# Patient Record
Sex: MALE | Race: WHITE | NOT HISPANIC OR LATINO | Employment: OTHER | URBAN - METROPOLITAN AREA
[De-identification: names, ages, dates, MRNs, and addresses within clinical notes are randomized per-mention and may not be internally consistent; named-entity substitution may affect disease eponyms.]

---

## 2017-08-25 ENCOUNTER — OFFICE VISIT (OUTPATIENT)
Dept: URGENT CARE | Facility: URGENT CARE | Age: 82
End: 2017-08-25
Payer: COMMERCIAL

## 2017-08-25 VITALS
HEIGHT: 69 IN | TEMPERATURE: 98.4 F | SYSTOLIC BLOOD PRESSURE: 102 MMHG | HEART RATE: 76 BPM | OXYGEN SATURATION: 96 % | WEIGHT: 154 LBS | DIASTOLIC BLOOD PRESSURE: 74 MMHG | BODY MASS INDEX: 22.81 KG/M2

## 2017-08-25 DIAGNOSIS — T63.441A BEE STING REACTION, ACCIDENTAL OR UNINTENTIONAL, INITIAL ENCOUNTER: Primary | ICD-10-CM

## 2017-08-25 PROCEDURE — 99213 OFFICE O/P EST LOW 20 MIN: CPT | Performed by: FAMILY MEDICINE

## 2017-08-25 NOTE — MR AVS SNAPSHOT
"              After Visit Summary   8/25/2017    Matthew Branch    MRN: [id number]           Patient Information     Date Of Birth          2/25/1933        Visit Information        Provider Department      8/25/2017 7:55 PM Guerline Vaughn MD [facility]        Care Instructions      Insect, Spider, and Scorpion Bites and Stings  Most insect bites are harmless and cause only minor swelling or itching. But if you re allergic to insects such as wasps or bees, a sting can cause a life-threatening allergic reaction. Some ticks can carry and transmit serious diseases. The venom (poison) from scorpions and certain spiders can also be deadly, although this is rare. Knowing when to seek emergency care could save your life.     The black  (top) and brown recluse (bottom) are two poisonous spiders found in the [place].   When to go to the emergency room (ER)    Scorpion sting    Bite from a black, red, or brown  spider or brown recluse spider    Severe pain or swelling at the site of bite    A tick that is embedded in your skin and can not be easily removed at home    Signs of an allergic reaction such as:    Hives    Swelling of your eyes, lips, or the inside of your throat    Trouble breathing    Dizziness or confusion  What to expect in the ER    If you re having trouble breathing, you ll be given oxygen through a mask. In case of severe breathing difficulty, you may have a tube inserted in your throat and be placed on a ventilator (breathing machine).    If you are having a severe allergic reaction from a sting (called anaphylaxis), you may be given a shot of epinephrine. If it is known that you are allergic to bee or wasp stings, your doctor may give you a prescription for an \"epi-pen\" that you can keep with you at all times in case of a sting.    You may receive antivenin (a substance that reverses the effects of poison) for some spider bites and scorpion stings. Because " antivenin can sometimes cause other problems, your doctor will weigh the risks and benefits of this treatment.    Steroids such as prednisone are often used to treat allergic reactions. In many cases, your doctor will also prescribe an antihistamine to help relieve itching.  Easing symptoms of an insect bite or sting    Try to remove a stinger you can see. Use your fingernail, a knife edge, or credit card to scrape against the skin. Do not squeeze or pull.    Apply ice or a cold compress to reduce pain and swelling (keep a thin cloth between the cold source and the skin).   Date Last Reviewed: 12/1/2016 2000-2017 [employer]. [address] [place] [address], [place] [address]. All rights reserved. This information is not intended as a substitute for professional medical care. Always follow your healthcare professional's instructions.        Insect Sting: Local Reaction   You have been stung or bitten by an insect. The insect s venom or body fluid is causing your skin to react in the area where you were stung or bitten. This often causes redness, itching and swelling. This reaction will fade over a few hours, but it can last a few days. An insect bite or sting can become infected 1 to 3 days later, so watch for the signs below. Sometimes it is hard to tell the difference between a local reaction to the insect bite or sting and an early infection, so you may be given antibiotics.  Common insect stings causing problems are from wasps, bees, yellow jackets, and hornets. Common bites are from spiders, mosquitoes, fleas, or ticks. Other types of insects may be more common in different parts of the country or world.  Most people think of allergic reactions when someone has a rash or itchy skin. Symptoms can include:    Rash, hives, redness, welts, or blisters    Itching, burning, stinging, or pain    Swelling around the sting area.  Sometimes swelling spreads to other areas.  Home care  Medicines  The healthcare  provider may prescribe medicines to relieve swelling, itching, and pain. Follow the provider s instructions when taking these medicines.    If you had a severe reaction, the provider may prescribe an epinephrine kit. Epinephrine will stop an allergic reaction from getting worse. Before you leave the hospital, be sure that you understand when and how to use this medicine.    Diphenhydramine is an oral antihistamine available at drugstores and groceries. Unless a prescription antihistamine was given, you can use this medicine to reduce itching if large areas of the skin are involved. The medicine may make you sleepy, so be careful using it in the daytime or when going to school, working, or driving. Don t use diphenhydramine if you have glaucoma or if you are a man with trouble urinating because of an enlarged prostate. Other antihistamines cause less drowsiness and are good choices for daytime use. Ask your pharmacist for suggestions.    Don t use diphenhydramine cream on your skin. In some people it can cause additional reaction and make you allergic to this medicine.    Calamine lotion or oatmeal baths sometimes help with itching.    You may use acetaminophen or ibuprofen to control pain, unless another pain medicine was prescribed. Talk with your healthcare provider before using these medicines if you have chronic liver or kidney disease. Also talk with your provider if you ve had a stomach ulcer or GI bleeding.  General care      If itching is a problem, don t take hot showers or baths. Stay out of direct sunlight. These heat up your skin and will make the itching worse.    Use an ice pack to reduce local areas of redness and itching. You can make your own ice pack by putting ice cubes in a bag that seals and wrapping it in a thin towel. Don t put the ice directly on your skin, because it can damage the skin.    Try not to scratch any affected areas and damage the skin. This will help prevent an infection.    If  oral antibiotics were prescribed, be sure to take them until finished.  Preventing future reactions    Future reactions could be worse than this one, so try to stay away from places where you might be stung again.    Be aware that honeybees nest in trees. Wasps and yellow jackets nest in the ground, trees, or roof eaves.    If you are stung by a honeybee, a stinger will remain in your skin. Wasps, yellow jackets, and hornets don t leave a stinger behind. Move away from the nest area right away. The stinger of a honeybee releases a substance that will attract other bees to you. Once you are away from the nest, then remove the stinger as quickly as possible.    After any sting, you may apply ice and take diphenhydramine or another antihistamine. If you develop any of the warning signs below, seek help right away.    If you are at high risk for another sting, or if your reaction included dizziness, fainting, or trouble breathing or swallowing, ask your doctor for an insect allergy kit.    Remove any ticks on the skin with a set of fine tweezers.  the tick as close to the skin as possible. Pull back gently but firmly. Use an even, steady pressure. Don t jerk or twist. Don t squeeze, crush, or puncture the body of the tick. The bodily fluids may contain infection-causing germs. Don t use a smoldering match or cigarette, nail polish, petroleum jelly, liquid soap, or kerosene. These may irritate the tick. If any mouthparts of the tick remain in the skin, these should be left alone. They will fall off on their own. Trying to remove these parts may damage the skin unless they can be removed very easily. After the tick is removed, wash the bite area with rubbing alcohol, iodine, or soap and water.  Follow-up care  Follow up with your doctor in 2 days, or as advised, if your symptoms don t start to get better.  Call 911  Call 911 if any of these occur:    Trouble breathing or swallowing, or wheezing    New or worsening  swelling in the mouth, throat, or tongue    Hoarse voice or trouble speaking    Confused    Very drowsy or trouble awakening    Fainting or loss of consciousness    Rapid heart rate    Low blood pressure    Feeling of doom    Nausea, vomiting, abdominal pain, or diarrhea    Vomiting blood, or large amounts of blood in stool    Seizure  When to seek medical advice  Call your healthcare provider right away if any of these occur:    Spreading areas of itching, redness or swelling    New or worse swelling in the face, eyelids, or  lips    Dizziness or weakness  Also call your provider right away if you have signs of infection:    Spreading redness    Increased pain or swelling    Fever of 100.4 F (38 C) or higher, or as directed by your healthcare provider    Colored fluid draining from the sting area   Date Last Reviewed: 10/1/2016    7682-7146 [employer]. [address] [place] [address]. All rights reserved. This information is not intended as a substitute for professional medical care. Always follow your healthcare professional's instructions.                Follow-ups after your visit        Who to contact     If you have questions or need follow up information about today's clinic visit or your schedule please contact [facility] directly at [phone].  Normal or non-critical lab and imaging results will be communicated to you by MyChart, letter or phone within 4 business days after the clinic has received the results. If you do not hear from us within 7 days, please contact the clinic through [employer] or phone. If you have a critical or abnormal lab result, we will notify you by phone as soon as possible.  Submit refill requests through [employer]ix or call your pharmacy and they will forward the refill request to us. Please allow 3 business days for your refill to be completed.          Additional Information About Your Visit        MyChart Information     [employer]ix lets  "you send messages to your doctor, view your test results, renew your prescriptions, schedule appointments and more. To sign up, go to [email or web address] . Click on \"Log in\" on the left side of the screen, which will take you to the Welcome page. Then click on \"Sign up Now\" on the right side of the page.     You will be asked to enter the access code listed below, as well as some personal information. Please follow the directions to create your username and password.     Your access code is: [email or web address]  Expires: 2017  8:19 PM     Your access code will  in 90 days. If you need help or a new code, please call your [facility] clinic or [phone].        Care EveryWhere ID     This is your Care EveryWhere ID. This could be used by other organizations to access your [facility] medical records  [id number]        Your Vitals Were     Pulse Temperature Height Pulse Oximetry BMI (Body Mass Index)       76 98.4  F (36.9  C) (Oral) 5' 8.5\" (1.74 m) 96% 23.08 kg/m2        Blood Pressure from Last 3 Encounters:   17 102/74   04/14/15 134/72   12 173/90    Weight from Last 3 Encounters:   17 154 lb (69.9 kg)   04/14/15 171 lb (77.6 kg)   12/10/13 170 lb (77.1 kg)              Today, you had the following     No orders found for display       Primary Care Provider Office Phone # Fax #    Anatoly Aburto [phone] [address] [address]        Equal Access to Services     [facility] : Hadii kostas Parker, christine cazares, leni matt . So [facility] [phone].    ATENCIÓN: Si habla español, tiene a west disposición servicios gratuitos de asistencia lingüística. Llame al [phone].    We comply with applicable federal civil rights laws and [place] laws. We do not discriminate on the basis of race, color, national origin, age, disability sex, sexual orientation or gender " identity.            Thank you!     Thank you for choosing [facility]  for your care. Our goal is always to provide you with excellent care. Hearing back from our patients is one way we can continue to improve our services. Please take a few minutes to complete the written survey that you may receive in the mail after your visit with us. Thank you!             Your Updated Medication List - Protect others around you: Learn how to safely use, store and throw away your medicines at www.disposemymeds.org.          This list is accurate as of: 8/25/17  8:19 PM.  Always use your most recent med list.                   Brand Name Dispense Instructions for use Diagnosis    BENAZEPRIL HCL PO      Take 80 mg by mouth daily.        cholecalciferol 1000 UNIT tablet    vitamin D    100 tablet    Take 1 tablet by mouth daily.    Status post THR (total hip replacement)       * GABAPENTIN PO      Take 600 mg by mouth 3 times daily        * gabapentin 300 MG capsule    NEURONTIN    90 capsule    Take 1 capsule (300 mg) by mouth 3 times daily    Lumbar stenosis       hydrochlorothiazide 25 MG tablet    HYDRODIURIL     Take 25 mg by mouth daily.        * HYDROcodone-acetaminophen 5-325 MG per tablet    NORCO    50 tablet    Take 1 tablet by mouth every 8 hours as needed for pain    Lumbar spinal stenosis       * HYDROcodone-acetaminophen 5-325 MG per tablet    NORCO    50 tablet    Take 1 tablet by mouth every 8 hours as needed    Spinal stenosis       MIRALAX powder   Generic drug:  polyethylene glycol      Take 1 capful by mouth daily        NIFEDICAL XL 30 MG 24 hr tablet   Generic drug:  NIFEdipine ER osmotic      Take 30 mg by mouth daily.        * Notice:  This list has 4 medication(s) that are the same as other medications prescribed for you. Read the directions carefully, and ask your doctor or other care provider to review them with you.

## 2017-08-26 NOTE — NURSING NOTE
"Chief Complaint   Patient presents with     Urgent Care     Insect Bites     ~ 2:30PM today was stung by bee on left side of neck.  Area is swollen and reddened, feels a bit irritated.  Denies dyspnea, denies swelling in mouth or throat.  Did take an OTC ? antihistamine earlier.     Initial /74  Pulse 76  Temp 98.4  F (36.9  C) (Oral)  Ht 5' 8.5\" (1.74 m)  Wt 154 lb (69.9 kg)  SpO2 96%  BMI 23.08 kg/m2 Estimated body mass index is 23.08 kg/(m^2) as calculated from the following:    Height as of this encounter: 5' 8.5\" (1.74 m).    Weight as of this encounter: 154 lb (69.9 kg)..  BP completed using cuff size: rita Perez RN  "

## 2017-08-26 NOTE — PATIENT INSTRUCTIONS
"  Insect, Spider, and Scorpion Bites and Stings  Most insect bites are harmless and cause only minor swelling or itching. But if you re allergic to insects such as wasps or bees, a sting can cause a life-threatening allergic reaction. Some ticks can carry and transmit serious diseases. The venom (poison) from scorpions and certain spiders can also be deadly, although this is rare. Knowing when to seek emergency care could save your life.     The black  (top) and brown recluse (bottom) are two poisonous spiders found in the [place].   When to go to the emergency room (ER)    Scorpion sting    Bite from a black, red, or brown  spider or brown recluse spider    Severe pain or swelling at the site of bite    A tick that is embedded in your skin and can not be easily removed at home    Signs of an allergic reaction such as:    Hives    Swelling of your eyes, lips, or the inside of your throat    Trouble breathing    Dizziness or confusion  What to expect in the ER    If you re having trouble breathing, you ll be given oxygen through a mask. In case of severe breathing difficulty, you may have a tube inserted in your throat and be placed on a ventilator (breathing machine).    If you are having a severe allergic reaction from a sting (called anaphylaxis), you may be given a shot of epinephrine. If it is known that you are allergic to bee or wasp stings, your doctor may give you a prescription for an \"epi-pen\" that you can keep with you at all times in case of a sting.    You may receive antivenin (a substance that reverses the effects of poison) for some spider bites and scorpion stings. Because antivenin can sometimes cause other problems, your doctor will weigh the risks and benefits of this treatment.    Steroids such as prednisone are often used to treat allergic reactions. In many cases, your doctor will also prescribe an antihistamine to help relieve itching.  Easing symptoms of an insect bite or " sting    Try to remove a stinger you can see. Use your fingernail, a knife edge, or credit card to scrape against the skin. Do not squeeze or pull.    Apply ice or a cold compress to reduce pain and swelling (keep a thin cloth between the cold source and the skin).   Date Last Reviewed: 12/1/2016 2000-2017 [employer]. [address] [place] [address], [place] [address]. All rights reserved. This information is not intended as a substitute for professional medical care. Always follow your healthcare professional's instructions.        Insect Sting: Local Reaction   You have been stung or bitten by an insect. The insect s venom or body fluid is causing your skin to react in the area where you were stung or bitten. This often causes redness, itching and swelling. This reaction will fade over a few hours, but it can last a few days. An insect bite or sting can become infected 1 to 3 days later, so watch for the signs below. Sometimes it is hard to tell the difference between a local reaction to the insect bite or sting and an early infection, so you may be given antibiotics.  Common insect stings causing problems are from wasps, bees, yellow jackets, and hornets. Common bites are from spiders, mosquitoes, fleas, or ticks. Other types of insects may be more common in different parts of the country or world.  Most people think of allergic reactions when someone has a rash or itchy skin. Symptoms can include:    Rash, hives, redness, welts, or blisters    Itching, burning, stinging, or pain    Swelling around the sting area.  Sometimes swelling spreads to other areas.  Home care  Medicines  The healthcare provider may prescribe medicines to relieve swelling, itching, and pain. Follow the provider s instructions when taking these medicines.    If you had a severe reaction, the provider may prescribe an epinephrine kit. Epinephrine will stop an allergic reaction from getting worse. Before you leave the hospital, be  sure that you understand when and how to use this medicine.    Diphenhydramine is an oral antihistamine available at drugstores and groceries. Unless a prescription antihistamine was given, you can use this medicine to reduce itching if large areas of the skin are involved. The medicine may make you sleepy, so be careful using it in the daytime or when going to school, working, or driving. Don t use diphenhydramine if you have glaucoma or if you are a man with trouble urinating because of an enlarged prostate. Other antihistamines cause less drowsiness and are good choices for daytime use. Ask your pharmacist for suggestions.    Don t use diphenhydramine cream on your skin. In some people it can cause additional reaction and make you allergic to this medicine.    Calamine lotion or oatmeal baths sometimes help with itching.    You may use acetaminophen or ibuprofen to control pain, unless another pain medicine was prescribed. Talk with your healthcare provider before using these medicines if you have chronic liver or kidney disease. Also talk with your provider if you ve had a stomach ulcer or GI bleeding.  General care      If itching is a problem, don t take hot showers or baths. Stay out of direct sunlight. These heat up your skin and will make the itching worse.    Use an ice pack to reduce local areas of redness and itching. You can make your own ice pack by putting ice cubes in a bag that seals and wrapping it in a thin towel. Don t put the ice directly on your skin, because it can damage the skin.    Try not to scratch any affected areas and damage the skin. This will help prevent an infection.    If oral antibiotics were prescribed, be sure to take them until finished.  Preventing future reactions    Future reactions could be worse than this one, so try to stay away from places where you might be stung again.    Be aware that honeybees nest in trees. Wasps and yellow jackets nest in the ground, trees, or  roof eaves.    If you are stung by a honeybee, a stinger will remain in your skin. Wasps, yellow jackets, and hornets don t leave a stinger behind. Move away from the nest area right away. The stinger of a honeybee releases a substance that will attract other bees to you. Once you are away from the nest, then remove the stinger as quickly as possible.    After any sting, you may apply ice and take diphenhydramine or another antihistamine. If you develop any of the warning signs below, seek help right away.    If you are at high risk for another sting, or if your reaction included dizziness, fainting, or trouble breathing or swallowing, ask your doctor for an insect allergy kit.    Remove any ticks on the skin with a set of fine tweezers.  the tick as close to the skin as possible. Pull back gently but firmly. Use an even, steady pressure. Don t jerk or twist. Don t squeeze, crush, or puncture the body of the tick. The bodily fluids may contain infection-causing germs. Don t use a smoldering match or cigarette, nail polish, petroleum jelly, liquid soap, or kerosene. These may irritate the tick. If any mouthparts of the tick remain in the skin, these should be left alone. They will fall off on their own. Trying to remove these parts may damage the skin unless they can be removed very easily. After the tick is removed, wash the bite area with rubbing alcohol, iodine, or soap and water.  Follow-up care  Follow up with your doctor in 2 days, or as advised, if your symptoms don t start to get better.  Call 911  Call 911 if any of these occur:    Trouble breathing or swallowing, or wheezing    New or worsening swelling in the mouth, throat, or tongue    Hoarse voice or trouble speaking    Confused    Very drowsy or trouble awakening    Fainting or loss of consciousness    Rapid heart rate    Low blood pressure    Feeling of doom    Nausea, vomiting, abdominal pain, or diarrhea    Vomiting blood, or large amounts of  blood in stool    Seizure  When to seek medical advice  Call your healthcare provider right away if any of these occur:    Spreading areas of itching, redness or swelling    New or worse swelling in the face, eyelids, or  lips    Dizziness or weakness  Also call your provider right away if you have signs of infection:    Spreading redness    Increased pain or swelling    Fever of 100.4 F (38 C) or higher, or as directed by your healthcare provider    Colored fluid draining from the sting area   Date Last Reviewed: 10/1/2016    9955-9345 [employer]. [address] [place], [place] [id number]. All rights reserved. This information is not intended as a substitute for professional medical care. Always follow your healthcare professional's instructions.

## 2017-08-26 NOTE — PROGRESS NOTES
SUBJECTIVE:  Chief Complaint   Patient presents with     Urgent Care     Insect Bites     ~ 2:30PM today was stung by bee on left side of neck.  Area is swollen and reddened, feels a bit irritated.  Denies dyspnea, denies swelling in mouth or throat.  Did take an OTC ? antihistamine earlier.     Matthew Branch is a 84 year old male who presents with a chief complaint of an insect bite on the left  Anterior neck.   Was bitten by a bee 4 hours ago.    Severity: mild.    Associated symptoms: immediate pain, redness noted, swelling and edema at site  No problems of wheezing, mouth/ throat swelling, shortness of breath.  No hives  No fevers or chills    last tetanus booster 15 years ago-  Due to age he is not receiving boosters    Past Medical History:   Diagnosis Date     Arthritis     back, (L) hip, (L) knee     Hypertension      Numbness and tingling     drop foot (L) side with numbness     Prostate cancer (H)      Vitamin D deficiency        ALLERGIES:  Amitriptyline hcl; Bees; and Lamisil   Past bee allergy- his leg swelled, no breathing or mouth symptoms      Current Outpatient Prescriptions on File Prior to Visit:  GABAPENTIN PO Take 600 mg by mouth 3 times daily   HYDROcodone-acetaminophen (NORCO) 5-325 MG per tablet Take 1 tablet by mouth every 8 hours as needed for pain   hydrochlorothiazide (HYDRODIURIL) 25 MG tablet Take 25 mg by mouth daily.   cholecalciferol (VITAMIN D) 1000 UNIT tablet Take 1 tablet by mouth daily.   BENAZEPRIL HCL PO Take 80 mg by mouth daily.   NIFEdipine osmotic (NIFEDICAL XL) 30 MG 24 hr tablet Take 30 mg by mouth daily.   HYDROcodone-acetaminophen (NORCO) 5-325 MG per tablet Take 1 tablet by mouth every 8 hours as needed   gabapentin (NEURONTIN) 300 MG capsule Take 1 capsule (300 mg) by mouth 3 times daily (Patient not taking: Reported on 8/25/2017)   polyethylene glycol (MIRALAX) powder Take 1 capful by mouth daily     No current facility-administered medications on file prior to  "visit.     Social History   Substance Use Topics     Smoking status: Never Smoker     Smokeless tobacco: Never Used     Alcohol use Yes      Comment: wine on w/e       No family history on file.    ROS:  CONSTITUTIONAL:NEGATIVE for fever, chills, change in weight  EYES: NEGATIVE for vision changes or irritation  ENT/MOUTH: NEGATIVE for ear, mouth and throat problems  RESP:NEGATIVE for significant cough or SOB  CV: NEGATIVE for chest pain, palpitations or peripheral edema  GI: NEGATIVE for nausea, abdominal pain, heartburn, or change in bowel habits  Review of systems negative except as stated above.    OBJECTIVE:  /74  Pulse 76  Temp 98.4  F (36.9  C) (Oral)  Ht 5' 8.5\" (1.74 m)  Wt 154 lb (69.9 kg)  SpO2 96%  BMI 23.08 kg/m2  GENERAL APPEARANCE: alert, mild distress and cooperative  EYES: EOMI,  PERRL, conjunctiva clear  HENT: ear canals and TM's normal.  Nose and mouth without ulcers, erythema or lesions   No symptoms to the mouth or throat  NECK: supple, non-tender to palpation, no adenopathy noted  RESP: lungs clear to auscultation - no rales, rhonchi or wheezes  CV: regular rates and rhythm, normal S1 S2, no murmur noted  SKIN: swelling of left anterior neck, loose skin fold    Size  8 x 8 cm of slight erythema  Has local swelling,  Not itchy, mildly firm,  No drainage     MS:extremities normal- no gross deformities noted,  FROM noted in all extremities  ,NEURO: Normal strength and tone, sensory exam grossly normal,  normal speech and mentation    ASSESSMENT:  Bee sting reaction, accidental or unintentional, initial encounter      mild local reaction,  No systemic symptoms, no signs of anaphylaxis  OTC antihistamine prn  Apply cool wet towel to reduce swelling     Patient should return to UC or primary MD if worsening  Patient to monitor  if there is worsening of the infection.  "

## 2019-07-09 ENCOUNTER — OFFICE VISIT (OUTPATIENT)
Dept: URGENT CARE | Facility: URGENT CARE | Age: 84
End: 2019-07-09
Payer: COMMERCIAL

## 2019-07-09 ENCOUNTER — ANCILLARY PROCEDURE (OUTPATIENT)
Dept: GENERAL RADIOLOGY | Facility: CLINIC | Age: 84
End: 2019-07-09
Attending: INTERNAL MEDICINE
Payer: COMMERCIAL

## 2019-07-09 VITALS
WEIGHT: 155 LBS | SYSTOLIC BLOOD PRESSURE: 118 MMHG | DIASTOLIC BLOOD PRESSURE: 71 MMHG | BODY MASS INDEX: 23.22 KG/M2 | TEMPERATURE: 97.8 F | OXYGEN SATURATION: 99 %
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
## 2019-07-09 DIAGNOSIS — R07.81 RIB PAIN ON RIGHT SIDE: ICD-10-CM
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
## 2019-07-09 DIAGNOSIS — S20.211A RIB CONTUSION, RIGHT, INITIAL ENCOUNTER: Primary | ICD-10-CM

## 2019-07-09 PROCEDURE — 71101 X-RAY EXAM UNILAT RIBS/CHEST: CPT | Mod: RT

## 2019-07-09 PROCEDURE — 99213 OFFICE O/P EST LOW 20 MIN: CPT | Performed by: INTERNAL MEDICINE

## 2019-07-09 ASSESSMENT — ENCOUNTER SYMPTOMS
SHORTNESS OF BREATH: 0
FEVER: 0
COUGH: 0

## 2019-07-09 NOTE — PROGRESS NOTES
SUBJECTIVE:   Matthew Branch is a 86 year old male presenting with a chief complaint of   Chief Complaint   Patient presents with     Urgent Care     Pt in clinic to have eval for right side rib pain due to fall.     Fall       He is an established patient of [facility].    MS Injury/Pain    Onset of symptoms was 5 day(s) ago.  Location: right chest wall/ribs  Context:       The injury happened while while walking      Mechanism: fall , up a stair      Patient experienced immediate pain  Course of symptoms is worsening.    Current and Associated symptoms: Pain and Tenderness  Aggravating Factors: movement  Therapies to improve symptoms include: none      Review of Systems   Constitutional: Negative for fever.   Respiratory: Negative for cough and shortness of breath.        Past Medical History:   Diagnosis Date     Arthritis     back, (L) hip, (L) knee     Hypertension      Numbness and tingling     drop foot (L) side with numbness     Prostate cancer (H)      Vitamin D deficiency      No family history on file.  Current Outpatient Medications   Medication Sig Dispense Refill     BENAZEPRIL HCL PO Take 80 mg by mouth daily.       cholecalciferol (VITAMIN D) 1000 UNIT tablet Take 1 tablet by mouth daily. 100 tablet      gabapentin (NEURONTIN) 300 MG capsule Take 1 capsule (300 mg) by mouth 3 times daily 90 capsule 2     GABAPENTIN PO Take 600 mg by mouth 3 times daily       hydrochlorothiazide (HYDRODIURIL) 25 MG tablet Take 25 mg by mouth daily.       HYDROcodone-acetaminophen (NORCO) 5-325 MG per tablet Take 1 tablet by mouth every 8 hours as needed 50 tablet 0     HYDROcodone-acetaminophen (NORCO) 5-325 MG per tablet Take 1 tablet by mouth every 8 hours as needed for pain 50 tablet 0     NIFEdipine osmotic (NIFEDICAL XL) 30 MG 24 hr tablet Take 30 mg by mouth daily.       order for DME Equipment being ordered: rib belt 1 Device 0     polyethylene glycol (MIRALAX) powder Take 1 capful by mouth daily       Social  History     Tobacco Use     Smoking status: Never Smoker     Smokeless tobacco: Never Used   Substance Use Topics     Alcohol use: Yes     Comment: wine on w/e       OBJECTIVE  /71   Temp 97.8  F (36.6  C) (Oral)   Wt 70.3 kg (155 lb)   SpO2 99%   BMI 23.22 kg/m      Physical Exam   Constitutional: He appears well-developed and well-nourished.   Cardiovascular: Normal rate, regular rhythm and normal heart sounds.   Pulmonary/Chest: Effort normal and breath sounds normal. He exhibits tenderness.   Localized to right anterior rib cage - rib 8  No crepitus, ecchymosis or deformity         Labs:  No results found for this or any previous visit (from the past 24 hour(s)).    X-Ray was done, my findings are: no Pneumothorax, infiltrate or fracture     ASSESSMENT:      ICD-10-CM    1. Rib contusion, right, initial encounter S20.211A order for DME   2. Fall W19.XXXA XR Ribs & Chest Right G/E 3 Views   3. Fall on stairs, initial encounter W10.9XXA    4. Rib pain on right side R07.81       PLAN:  Discussed risk pneumonia rib belt      Followup:      Patient Instructions       Xray- no fracture.  No hole in lung or pneumonia    Ice  ibuprofen   Rib belt.    Recheck if 2 weeks if concerns with primary      Patient Education     Rib Belt  A rib belt is an elastic strap that may help reduce pain from chest muscle strain or rib fracture. The belt limits motion in the chest and can help relieve pain from the chest wall and ribs. However, if worn too long, the rib belt can sometimes cause pneumonia or other problems. This is because it reduces the air flow into your lungs.  You can help avoid this problem by opening the belt and taking several very deep breaths once an hour while you're awake. As you breathe out, purse your lips as if you if you were blowing up a balloon. If possible, actually blow up a balloon or a rubber glove. This exercise builds up pressure inside the lung and prevents collapse of the small air sacs  of the lung. This exercise may cause some pain at the site of injury, which is normal. Sometimes a special device called an incentive spirometer may be given for this purpose.  Call 911  Call 911 if you have:    Shortness of breath    Increasing chest pain with breathing    Dizziness, weakness, or fainting    Development or worsening of abdominal pain    When to seek medical advice  Call your healthcare provider right away if any of these occur:    Pain over injured area increases    Fever of 100.4 F (38 C) or higher, or as directed by your healthcare provider    Chills  Date Last Reviewed: 5/1/2018 2000-2018 [employer]. [address] [place] [id number]. All rights reserved. This information is not intended as a substitute for professional medical care. Always follow your healthcare professional's instructions.           Patient Education     Rib Contusion or Minor Fracture    A rib contusion is a bruise to one or more rib bones. It may cause pain, tenderness, swelling, and a purplish color to the skin. There may be a sharp pain with each breath. A rib contusion takes anywhere from a few days to a few weeks to heal. A minor rib fracture or break may cause the same symptoms as a rib contusion. The small crack may not be seen on a regular chest X-ray. Treatment for both problems is basically the same.  Home care    You may use over-the-counter pain medicine to control pain, unless another pain medicine was prescribed. If you have chronic liver or kidney disease or ever had a stomach ulcer or GI (gastrointestinal) bleeding, talk with your healthcare provider before using these medicines.    Rest. Don't lift anything heavy or do any activity that causes pain.    Apply an ice pack over the injured area for 15 to 20 minutes every 1 to 2 hours. You should do this for the first 24 to 48 hours. To make an ice pack, put ice cubes in a plastic bag that seals at the top. Wrap the bag in a clean, thin  towel or cloth. Never put ice or an ice pack directly on the skin. Continue with ice packs as needed for the relief of pain and swelling.    The first 3 to 4 weeks of healing will be the most painful. If your pain is not under control with the treatment given, call your healthcare provider. Sometimes a stronger pain medicine may be needed. A nerve block can be done in case of severe pain. It will numb the nerve between the ribs.  Follow-up care  Follow up with your healthcare provider, or as advised.  If X-rays were taken, you will be told of any new findings that may affect your care.  Call 911  Call 911 if you have:    Dizziness, weakness or fainting    Shortness of breath with or without chest discomfort    New or worsening pain  When to seek medical advice  Call your healthcare provider right away if any of these occur:    Fever of 100.4 F (38 C) or higher, or as directed by your healthcare provider    Chills    Stomach pain, vomiting  Date Last Reviewed: 6/1/2018 2000-2018 [employer]. [address] [place] [address]. All rights reserved. This information is not intended as a substitute for professional medical care. Always follow your healthcare professional's instructions.           Patient Education     Rib Contusion or Minor Fracture    A rib contusion is a bruise to one or more rib bones. It may cause pain, tenderness, swelling, and a purplish color to the skin. There may be a sharp pain with each breath. A rib contusion takes anywhere from a few days to a few weeks to heal. A minor rib fracture or break may cause the same symptoms as a rib contusion. The small crack may not be seen on a regular chest X-ray. Treatment for both problems is basically the same.  Home care    You may use over-the-counter pain medicine to control pain, unless another pain medicine was prescribed. If you have chronic liver or kidney disease or ever had a stomach ulcer or GI (gastrointestinal) bleeding,  talk with your healthcare provider before using these medicines.    Rest. Don't lift anything heavy or do any activity that causes pain.    Apply an ice pack over the injured area for 15 to 20 minutes every 1 to 2 hours. You should do this for the first 24 to 48 hours. To make an ice pack, put ice cubes in a plastic bag that seals at the top. Wrap the bag in a clean, thin towel or cloth. Never put ice or an ice pack directly on the skin. Continue with ice packs as needed for the relief of pain and swelling.    The first 3 to 4 weeks of healing will be the most painful. If your pain is not under control with the treatment given, call your healthcare provider. Sometimes a stronger pain medicine may be needed. A nerve block can be done in case of severe pain. It will numb the nerve between the ribs.  Follow-up care  Follow up with your healthcare provider, or as advised.  If X-rays were taken, you will be told of any new findings that may affect your care.  Call 911  Call 911 if you have:    Dizziness, weakness or fainting    Shortness of breath with or without chest discomfort    New or worsening pain  When to seek medical advice  Call your healthcare provider right away if any of these occur:    Fever of 100.4 F (38 C) or higher, or as directed by your healthcare provider    Chills    Stomach pain, vomiting  Date Last Reviewed: 6/1/2018 2000-2018 [employer]. [address] [place] [id number]. All rights reserved. This information is not intended as a substitute for professional medical care. Always follow your healthcare professional's instructions.           Education Handout Discussed and Given on rib contusion

## 2019-07-09 NOTE — PATIENT INSTRUCTIONS
Xray- no fracture.  No hole in lung or pneumonia    Ice  ibuprofen   Rib belt.    Recheck if 2 weeks if concerns with primary      Patient Education     Rib Belt  A rib belt is an elastic strap that may help reduce pain from chest muscle strain or rib fracture. The belt limits motion in the chest and can help relieve pain from the chest wall and ribs. However, if worn too long, the rib belt can sometimes cause pneumonia or other problems. This is because it reduces the air flow into your lungs.  You can help avoid this problem by opening the belt and taking several very deep breaths once an hour while you're awake. As you breathe out, purse your lips as if you if you were blowing up a balloon. If possible, actually blow up a balloon or a rubber glove. This exercise builds up pressure inside the lung and prevents collapse of the small air sacs of the lung. This exercise may cause some pain at the site of injury, which is normal. Sometimes a special device called an incentive spirometer may be given for this purpose.  Call 911  Call 911 if you have:    Shortness of breath    Increasing chest pain with breathing    Dizziness, weakness, or fainting    Development or worsening of abdominal pain    When to seek medical advice  Call your healthcare provider right away if any of these occur:    Pain over injured area increases    Fever of 100.4 F (38 C) or higher, or as directed by your healthcare provider    Chills  Date Last Reviewed: 5/1/2018 2000-2018 [employer]. [address] [place] [address], [place] [id number]. All rights reserved. This information is not intended as a substitute for professional medical care. Always follow your healthcare professional's instructions.           Patient Education     Rib Contusion or Minor Fracture    A rib contusion is a bruise to one or more rib bones. It may cause pain, tenderness, swelling, and a purplish color to the skin. There may be a sharp pain with each breath.  A rib contusion takes anywhere from a few days to a few weeks to heal. A minor rib fracture or break may cause the same symptoms as a rib contusion. The small crack may not be seen on a regular chest X-ray. Treatment for both problems is basically the same.  Home care    You may use over-the-counter pain medicine to control pain, unless another pain medicine was prescribed. If you have chronic liver or kidney disease or ever had a stomach ulcer or GI (gastrointestinal) bleeding, talk with your healthcare provider before using these medicines.    Rest. Don't lift anything heavy or do any activity that causes pain.    Apply an ice pack over the injured area for 15 to 20 minutes every 1 to 2 hours. You should do this for the first 24 to 48 hours. To make an ice pack, put ice cubes in a plastic bag that seals at the top. Wrap the bag in a clean, thin towel or cloth. Never put ice or an ice pack directly on the skin. Continue with ice packs as needed for the relief of pain and swelling.    The first 3 to 4 weeks of healing will be the most painful. If your pain is not under control with the treatment given, call your healthcare provider. Sometimes a stronger pain medicine may be needed. A nerve block can be done in case of severe pain. It will numb the nerve between the ribs.  Follow-up care  Follow up with your healthcare provider, or as advised.  If X-rays were taken, you will be told of any new findings that may affect your care.  Call 911  Call 911 if you have:    Dizziness, weakness or fainting    Shortness of breath with or without chest discomfort    New or worsening pain  When to seek medical advice  Call your healthcare provider right away if any of these occur:    Fever of 100.4 F (38 C) or higher, or as directed by your healthcare provider    Chills    Stomach pain, vomiting  Date Last Reviewed: 6/1/2018 2000-2018 [employer]. [address] [place] [address], [place] [address]. All rights reserved.  This information is not intended as a substitute for professional medical care. Always follow your healthcare professional's instructions.           Patient Education     Rib Contusion or Minor Fracture    A rib contusion is a bruise to one or more rib bones. It may cause pain, tenderness, swelling, and a purplish color to the skin. There may be a sharp pain with each breath. A rib contusion takes anywhere from a few days to a few weeks to heal. A minor rib fracture or break may cause the same symptoms as a rib contusion. The small crack may not be seen on a regular chest X-ray. Treatment for both problems is basically the same.  Home care    You may use over-the-counter pain medicine to control pain, unless another pain medicine was prescribed. If you have chronic liver or kidney disease or ever had a stomach ulcer or GI (gastrointestinal) bleeding, talk with your healthcare provider before using these medicines.    Rest. Don't lift anything heavy or do any activity that causes pain.    Apply an ice pack over the injured area for 15 to 20 minutes every 1 to 2 hours. You should do this for the first 24 to 48 hours. To make an ice pack, put ice cubes in a plastic bag that seals at the top. Wrap the bag in a clean, thin towel or cloth. Never put ice or an ice pack directly on the skin. Continue with ice packs as needed for the relief of pain and swelling.    The first 3 to 4 weeks of healing will be the most painful. If your pain is not under control with the treatment given, call your healthcare provider. Sometimes a stronger pain medicine may be needed. A nerve block can be done in case of severe pain. It will numb the nerve between the ribs.  Follow-up care  Follow up with your healthcare provider, or as advised.  If X-rays were taken, you will be told of any new findings that may affect your care.  Call 911  Call 911 if you have:    Dizziness, weakness or fainting    Shortness of breath with or without chest  discomfort    New or worsening pain  When to seek medical advice  Call your healthcare provider right away if any of these occur:    Fever of 100.4 F (38 C) or higher, or as directed by your healthcare provider    Chills    Stomach pain, vomiting  Date Last Reviewed: 6/1/2018 2000-2018 [employer]. [address] [place] [id number]. All rights reserved. This information is not intended as a substitute for professional medical care. Always follow your healthcare professional's instructions.

## 2019-08-05 ENCOUNTER — HOSPITAL ENCOUNTER (OUTPATIENT)
Dept: LAB | Facility: CLINIC | Age: 84
Discharge: HOME | End: 2019-08-05
Attending: ORTHOPAEDIC SURGERY | Admitting: ORTHOPAEDIC SURGERY
Payer: MEDICARE

## 2019-08-05 DIAGNOSIS — Z01.812 PRE-OPERATIVE LABORATORY EXAMINATION: ICD-10-CM

## 2019-08-05 LAB
MRSA DNA SPEC QL NAA+PROBE: NEGATIVE
SPECIMEN SOURCE: NORMAL

## 2019-08-05 PROCEDURE — 87641 MR-STAPH DNA AMP PROBE: CPT | Performed by: ORTHOPAEDIC SURGERY

## 2019-08-05 PROCEDURE — 87640 STAPH A DNA AMP PROBE: CPT | Performed by: ORTHOPAEDIC SURGERY

## 2019-08-05 PROCEDURE — 40000830 ZZHCL STATISTIC STAPH AUREUS METH RESIST SCREEN PCR: Performed by: ORTHOPAEDIC SURGERY

## 2019-08-05 PROCEDURE — 40000829 ZZHCL STATISTIC STAPH AUREUS SUSCEPT SCREEN PCR: Performed by: ORTHOPAEDIC SURGERY

## 2019-08-19 ENCOUNTER — ANESTHESIA EVENT (OUTPATIENT)
Dept: SURGERY | Facility: CLINIC | Age: 84
DRG: 470 | End: 2019-08-19
Payer: COMMERCIAL

## 2019-08-20 ENCOUNTER — ANESTHESIA (OUTPATIENT)
Dept: SURGERY | Facility: CLINIC | Age: 84
DRG: 470 | End: 2019-08-20
Payer: COMMERCIAL

## 2019-08-20 ENCOUNTER — HOSPITAL ENCOUNTER (INPATIENT)
Facility: CLINIC | Age: 84
LOS: 2 days | Discharge: HOME | DRG: 470 | End: 2019-08-22
Attending: ORTHOPAEDIC SURGERY | Admitting: ORTHOPAEDIC SURGERY
Payer: COMMERCIAL

## 2019-08-20 ENCOUNTER — APPOINTMENT (OUTPATIENT)
Dept: GENERAL RADIOLOGY | Facility: CLINIC | Age: 84
DRG: 470 | End: 2019-08-20
Attending: ORTHOPAEDIC SURGERY
Payer: COMMERCIAL

## 2019-08-20 DIAGNOSIS — Z96.641 STATUS POST TOTAL HIP REPLACEMENT, RIGHT: Primary | ICD-10-CM

## 2019-08-20 LAB
ABO + RH BLD: NORMAL
ABO + RH BLD: NORMAL
BLD GP AB SCN SERPL QL: NORMAL
BLOOD BANK CMNT PATIENT-IMP: NORMAL
CREAT SERPL-MCNC: 0.71 MG/DL (ref 0.66–1.25)
GFR SERPL CREATININE-BSD FRML MDRD: 85 ML/MIN/{1.73_M2}
POTASSIUM SERPL-SCNC: 3.6 MMOL/L (ref 3.4–5.3)
SODIUM SERPL-SCNC: 136 MMOL/L (ref 133–144)
SPECIMEN EXP DATE BLD: NORMAL

## 2019-08-20 PROCEDURE — 27210794 ZZH OR GENERAL SUPPLY STERILE: Performed by: ORTHOPAEDIC SURGERY

## 2019-08-20 PROCEDURE — 25000125 ZZHC RX 250: Performed by: ORTHOPAEDIC SURGERY

## 2019-08-20 PROCEDURE — 71000012 ZZH RECOVERY PHASE 1 LEVEL 1 FIRST HR: Performed by: ORTHOPAEDIC SURGERY

## 2019-08-20 PROCEDURE — 25000128 H RX IP 250 OP 636: Performed by: NURSE ANESTHETIST, CERTIFIED REGISTERED

## 2019-08-20 PROCEDURE — 86850 RBC ANTIBODY SCREEN: CPT | Performed by: ANESTHESIOLOGY

## 2019-08-20 PROCEDURE — 84132 ASSAY OF SERUM POTASSIUM: CPT | Performed by: ANESTHESIOLOGY

## 2019-08-20 PROCEDURE — 25800030 ZZH RX IP 258 OP 636: Performed by: ANESTHESIOLOGY

## 2019-08-20 PROCEDURE — 25000128 H RX IP 250 OP 636: Performed by: ORTHOPAEDIC SURGERY

## 2019-08-20 PROCEDURE — 84295 ASSAY OF SERUM SODIUM: CPT | Performed by: ANESTHESIOLOGY

## 2019-08-20 PROCEDURE — 40000277 XR SURGERY CARM FLUORO LESS THAN 5 MIN W STILLS

## 2019-08-20 PROCEDURE — C1776 JOINT DEVICE (IMPLANTABLE): HCPCS | Performed by: ORTHOPAEDIC SURGERY

## 2019-08-20 PROCEDURE — 86900 BLOOD TYPING SEROLOGIC ABO: CPT | Performed by: ANESTHESIOLOGY

## 2019-08-20 PROCEDURE — 37000008 ZZH ANESTHESIA TECHNICAL FEE, 1ST 30 MIN: Performed by: ORTHOPAEDIC SURGERY

## 2019-08-20 PROCEDURE — 86901 BLOOD TYPING SEROLOGIC RH(D): CPT | Performed by: ANESTHESIOLOGY

## 2019-08-20 PROCEDURE — 40000170 ZZH STATISTIC PRE-PROCEDURE ASSESSMENT II: Performed by: ORTHOPAEDIC SURGERY

## 2019-08-20 PROCEDURE — 71000013 ZZH RECOVERY PHASE 1 LEVEL 1 EA ADDTL HR: Performed by: ORTHOPAEDIC SURGERY

## 2019-08-20 PROCEDURE — 25000128 H RX IP 250 OP 636: Performed by: ANESTHESIOLOGY

## 2019-08-20 PROCEDURE — 36000063 ZZH SURGERY LEVEL 4 EA 15 ADDTL MIN: Performed by: ORTHOPAEDIC SURGERY

## 2019-08-20 PROCEDURE — 36415 COLL VENOUS BLD VENIPUNCTURE: CPT | Performed by: ANESTHESIOLOGY

## 2019-08-20 PROCEDURE — C1713 ANCHOR/SCREW BN/BN,TIS/BN: HCPCS | Performed by: ORTHOPAEDIC SURGERY

## 2019-08-20 PROCEDURE — 25000125 ZZHC RX 250: Performed by: NURSE ANESTHETIST, CERTIFIED REGISTERED

## 2019-08-20 PROCEDURE — 37000009 ZZH ANESTHESIA TECHNICAL FEE, EACH ADDTL 15 MIN: Performed by: ORTHOPAEDIC SURGERY

## 2019-08-20 PROCEDURE — 82565 ASSAY OF CREATININE: CPT | Performed by: ANESTHESIOLOGY

## 2019-08-20 PROCEDURE — 25000132 ZZH RX MED GY IP 250 OP 250 PS 637: Performed by: ORTHOPAEDIC SURGERY

## 2019-08-20 PROCEDURE — 36000065 ZZH SURGERY LEVEL 4 W FLUORO 1ST 30 MIN: Performed by: ORTHOPAEDIC SURGERY

## 2019-08-20 PROCEDURE — 25800030 ZZH RX IP 258 OP 636: Performed by: ORTHOPAEDIC SURGERY

## 2019-08-20 PROCEDURE — 0SR904A REPLACEMENT OF RIGHT HIP JOINT WITH CERAMIC ON POLYETHYLENE SYNTHETIC SUBSTITUTE, UNCEMENTED, OPEN APPROACH: ICD-10-PCS | Performed by: ORTHOPAEDIC SURGERY

## 2019-08-20 PROCEDURE — 12000000 ZZH R&B MED SURG/OB

## 2019-08-20 PROCEDURE — 25800025 ZZH RX 258: Performed by: ORTHOPAEDIC SURGERY

## 2019-08-20 PROCEDURE — 40000986 XR PELVIS AD HIP PORTABLE RIGHT 1 VIEW

## 2019-08-20 PROCEDURE — 25000566 ZZH SEVOFLURANE, EA 15 MIN: Performed by: ORTHOPAEDIC SURGERY

## 2019-08-20 DEVICE — IMPLANTABLE DEVICE: Type: IMPLANTABLE DEVICE | Site: HIP | Status: FUNCTIONAL

## 2019-08-20 DEVICE — IMP SCR ZIM 6.5X30MM ACET CUP SELF TAP 00-6250-065-30: Type: IMPLANTABLE DEVICE | Site: HIP | Status: FUNCTIONAL

## 2019-08-20 DEVICE — IMP SHELL BIOM G7 ACETAB PPS LIM HOLE 58MM SZ G 010000666: Type: IMPLANTABLE DEVICE | Site: HIP | Status: FUNCTIONAL

## 2019-08-20 DEVICE — IMP HEAD FEM ZIM BIOLOX DELTA CER 36MM +0 00-8775-036-02: Type: IMPLANTABLE DEVICE | Site: HIP | Status: FUNCTIONAL

## 2019-08-20 DEVICE — IMP SCR ZIM 6.5X40MM ACET CUP SELF TAP 00-6250-065-40: Type: IMPLANTABLE DEVICE | Site: HIP | Status: FUNCTIONAL

## 2019-08-20 RX ORDER — GABAPENTIN 600 MG/1
600 TABLET ORAL 3 TIMES DAILY
Status: DISCONTINUED | OUTPATIENT
Start: 2019-08-20 | End: 2019-08-22 | Stop reason: HOSPADM

## 2019-08-20 RX ORDER — AMOXICILLIN 250 MG
2 CAPSULE ORAL 2 TIMES DAILY
Status: DISCONTINUED | OUTPATIENT
Start: 2019-08-20 | End: 2019-08-22 | Stop reason: HOSPADM

## 2019-08-20 RX ORDER — VECURONIUM BROMIDE 1 MG/ML
INJECTION, POWDER, LYOPHILIZED, FOR SOLUTION INTRAVENOUS PRN
Status: DISCONTINUED | OUTPATIENT
Start: 2019-08-20 | End: 2019-08-20

## 2019-08-20 RX ORDER — ONDANSETRON 4 MG/1
4 TABLET, ORALLY DISINTEGRATING ORAL EVERY 6 HOURS PRN
Status: DISCONTINUED | OUTPATIENT
Start: 2019-08-20 | End: 2019-08-22 | Stop reason: HOSPADM

## 2019-08-20 RX ORDER — MAGNESIUM HYDROXIDE 1200 MG/15ML
LIQUID ORAL PRN
Status: DISCONTINUED | OUTPATIENT
Start: 2019-08-20 | End: 2019-08-20 | Stop reason: HOSPADM

## 2019-08-20 RX ORDER — HYDROMORPHONE HYDROCHLORIDE 1 MG/ML
0.25 INJECTION, SOLUTION INTRAMUSCULAR; INTRAVENOUS; SUBCUTANEOUS ONCE
Status: COMPLETED | OUTPATIENT
Start: 2019-08-20 | End: 2019-08-20

## 2019-08-20 RX ORDER — PROPOFOL 10 MG/ML
INJECTION, EMULSION INTRAVENOUS PRN
Status: DISCONTINUED | OUTPATIENT
Start: 2019-08-20 | End: 2019-08-20

## 2019-08-20 RX ORDER — CEFAZOLIN SODIUM 1 G/3ML
1 INJECTION, POWDER, FOR SOLUTION INTRAMUSCULAR; INTRAVENOUS EVERY 8 HOURS
Status: COMPLETED | OUTPATIENT
Start: 2019-08-20 | End: 2019-08-21

## 2019-08-20 RX ORDER — NIFEDIPINE 30 MG/1
30 TABLET, EXTENDED RELEASE ORAL DAILY
Status: DISCONTINUED | OUTPATIENT
Start: 2019-08-21 | End: 2019-08-22 | Stop reason: HOSPADM

## 2019-08-20 RX ORDER — DEXAMETHASONE SODIUM PHOSPHATE 4 MG/ML
INJECTION, SOLUTION INTRA-ARTICULAR; INTRALESIONAL; INTRAMUSCULAR; INTRAVENOUS; SOFT TISSUE PRN
Status: DISCONTINUED | OUTPATIENT
Start: 2019-08-20 | End: 2019-08-20

## 2019-08-20 RX ORDER — KETOROLAC TROMETHAMINE 30 MG/ML
15 INJECTION, SOLUTION INTRAMUSCULAR; INTRAVENOUS
Status: COMPLETED | OUTPATIENT
Start: 2019-08-20 | End: 2019-08-20

## 2019-08-20 RX ORDER — VANCOMYCIN HYDROCHLORIDE 1 G/20ML
INJECTION, POWDER, LYOPHILIZED, FOR SOLUTION INTRAVENOUS PRN
Status: DISCONTINUED | OUTPATIENT
Start: 2019-08-20 | End: 2019-08-20 | Stop reason: HOSPADM

## 2019-08-20 RX ORDER — DIPHENHYDRAMINE HCL 12.5MG/5ML
12.5 LIQUID (ML) ORAL EVERY 6 HOURS PRN
Status: DISCONTINUED | OUTPATIENT
Start: 2019-08-20 | End: 2019-08-22 | Stop reason: HOSPADM

## 2019-08-20 RX ORDER — SODIUM CHLORIDE, SODIUM LACTATE, POTASSIUM CHLORIDE, CALCIUM CHLORIDE 600; 310; 30; 20 MG/100ML; MG/100ML; MG/100ML; MG/100ML
INJECTION, SOLUTION INTRAVENOUS CONTINUOUS
Status: DISCONTINUED | OUTPATIENT
Start: 2019-08-20 | End: 2019-08-20 | Stop reason: HOSPADM

## 2019-08-20 RX ORDER — FENTANYL CITRATE 50 UG/ML
INJECTION, SOLUTION INTRAMUSCULAR; INTRAVENOUS PRN
Status: DISCONTINUED | OUTPATIENT
Start: 2019-08-20 | End: 2019-08-20

## 2019-08-20 RX ORDER — NALOXONE HYDROCHLORIDE 0.4 MG/ML
.1-.4 INJECTION, SOLUTION INTRAMUSCULAR; INTRAVENOUS; SUBCUTANEOUS
Status: DISCONTINUED | OUTPATIENT
Start: 2019-08-20 | End: 2019-08-22 | Stop reason: HOSPADM

## 2019-08-20 RX ORDER — OXYCODONE HYDROCHLORIDE 5 MG/1
5-10 TABLET ORAL
Status: DISCONTINUED | OUTPATIENT
Start: 2019-08-20 | End: 2019-08-22 | Stop reason: HOSPADM

## 2019-08-20 RX ORDER — ONDANSETRON 4 MG/1
4 TABLET, ORALLY DISINTEGRATING ORAL EVERY 30 MIN PRN
Status: DISCONTINUED | OUTPATIENT
Start: 2019-08-20 | End: 2019-08-20 | Stop reason: HOSPADM

## 2019-08-20 RX ORDER — METHOCARBAMOL 500 MG/1
500 TABLET, FILM COATED ORAL 4 TIMES DAILY PRN
Status: DISCONTINUED | OUTPATIENT
Start: 2019-08-20 | End: 2019-08-22 | Stop reason: HOSPADM

## 2019-08-20 RX ORDER — GLYCOPYRROLATE 0.2 MG/ML
INJECTION, SOLUTION INTRAMUSCULAR; INTRAVENOUS PRN
Status: DISCONTINUED | OUTPATIENT
Start: 2019-08-20 | End: 2019-08-20

## 2019-08-20 RX ORDER — MEPERIDINE HYDROCHLORIDE 25 MG/ML
12.5 INJECTION INTRAMUSCULAR; INTRAVENOUS; SUBCUTANEOUS EVERY 5 MIN PRN
Status: DISCONTINUED | OUTPATIENT
Start: 2019-08-20 | End: 2019-08-20 | Stop reason: HOSPADM

## 2019-08-20 RX ORDER — CEFAZOLIN SODIUM 2 G/100ML
2 INJECTION, SOLUTION INTRAVENOUS
Status: COMPLETED | OUTPATIENT
Start: 2019-08-20 | End: 2019-08-20

## 2019-08-20 RX ORDER — PROCHLORPERAZINE MALEATE 5 MG
5 TABLET ORAL EVERY 6 HOURS PRN
Status: DISCONTINUED | OUTPATIENT
Start: 2019-08-20 | End: 2019-08-22 | Stop reason: HOSPADM

## 2019-08-20 RX ORDER — ONDANSETRON 2 MG/ML
INJECTION INTRAMUSCULAR; INTRAVENOUS PRN
Status: DISCONTINUED | OUTPATIENT
Start: 2019-08-20 | End: 2019-08-20

## 2019-08-20 RX ORDER — CEFAZOLIN SODIUM 1 G/3ML
1 INJECTION, POWDER, FOR SOLUTION INTRAMUSCULAR; INTRAVENOUS SEE ADMIN INSTRUCTIONS
Status: DISCONTINUED | OUTPATIENT
Start: 2019-08-20 | End: 2019-08-20 | Stop reason: HOSPADM

## 2019-08-20 RX ORDER — NALOXONE HYDROCHLORIDE 0.4 MG/ML
.1-.4 INJECTION, SOLUTION INTRAMUSCULAR; INTRAVENOUS; SUBCUTANEOUS
Status: DISCONTINUED | OUTPATIENT
Start: 2019-08-20 | End: 2019-08-20

## 2019-08-20 RX ORDER — ONDANSETRON 2 MG/ML
4 INJECTION INTRAMUSCULAR; INTRAVENOUS EVERY 6 HOURS PRN
Status: DISCONTINUED | OUTPATIENT
Start: 2019-08-20 | End: 2019-08-22 | Stop reason: HOSPADM

## 2019-08-20 RX ORDER — ONDANSETRON 2 MG/ML
4 INJECTION INTRAMUSCULAR; INTRAVENOUS EVERY 30 MIN PRN
Status: DISCONTINUED | OUTPATIENT
Start: 2019-08-20 | End: 2019-08-20 | Stop reason: HOSPADM

## 2019-08-20 RX ORDER — HYDROCODONE BITARTRATE AND ACETAMINOPHEN 5; 325 MG/1; MG/1
1 TABLET ORAL 4 TIMES DAILY
Status: ON HOLD | COMMUNITY
End: 2019-08-21

## 2019-08-20 RX ORDER — ALBUTEROL SULFATE 0.83 MG/ML
2.5 SOLUTION RESPIRATORY (INHALATION) EVERY 4 HOURS PRN
Status: DISCONTINUED | OUTPATIENT
Start: 2019-08-20 | End: 2019-08-20 | Stop reason: HOSPADM

## 2019-08-20 RX ORDER — LISINOPRIL 40 MG/1
40 TABLET ORAL DAILY
Status: DISCONTINUED | OUTPATIENT
Start: 2019-08-21 | End: 2019-08-22 | Stop reason: HOSPADM

## 2019-08-20 RX ORDER — ACETAMINOPHEN 325 MG/1
975 TABLET ORAL EVERY 8 HOURS
Status: DISCONTINUED | OUTPATIENT
Start: 2019-08-20 | End: 2019-08-22 | Stop reason: HOSPADM

## 2019-08-20 RX ORDER — LIDOCAINE 40 MG/G
CREAM TOPICAL
Status: DISCONTINUED | OUTPATIENT
Start: 2019-08-20 | End: 2019-08-22 | Stop reason: HOSPADM

## 2019-08-20 RX ORDER — OXYCODONE HCL 10 MG/1
10 TABLET, FILM COATED, EXTENDED RELEASE ORAL ONCE
Status: COMPLETED | OUTPATIENT
Start: 2019-08-20 | End: 2019-08-20

## 2019-08-20 RX ORDER — HYDROCHLOROTHIAZIDE 25 MG/1
25 TABLET ORAL DAILY
Status: DISCONTINUED | OUTPATIENT
Start: 2019-08-20 | End: 2019-08-22 | Stop reason: HOSPADM

## 2019-08-20 RX ORDER — FENTANYL CITRATE 50 UG/ML
25-50 INJECTION, SOLUTION INTRAMUSCULAR; INTRAVENOUS
Status: DISCONTINUED | OUTPATIENT
Start: 2019-08-20 | End: 2019-08-20 | Stop reason: HOSPADM

## 2019-08-20 RX ORDER — HYDROMORPHONE HYDROCHLORIDE 1 MG/ML
.3-.5 INJECTION, SOLUTION INTRAMUSCULAR; INTRAVENOUS; SUBCUTANEOUS EVERY 30 MIN PRN
Status: DISCONTINUED | OUTPATIENT
Start: 2019-08-20 | End: 2019-08-22 | Stop reason: HOSPADM

## 2019-08-20 RX ORDER — ACETAMINOPHEN 325 MG/1
650 TABLET ORAL EVERY 4 HOURS PRN
Status: DISCONTINUED | OUTPATIENT
Start: 2019-08-23 | End: 2019-08-22 | Stop reason: HOSPADM

## 2019-08-20 RX ORDER — NEOSTIGMINE METHYLSULFATE 1 MG/ML
VIAL (ML) INJECTION PRN
Status: DISCONTINUED | OUTPATIENT
Start: 2019-08-20 | End: 2019-08-20

## 2019-08-20 RX ORDER — AMOXICILLIN 250 MG
1 CAPSULE ORAL 2 TIMES DAILY
Status: DISCONTINUED | OUTPATIENT
Start: 2019-08-20 | End: 2019-08-22 | Stop reason: HOSPADM

## 2019-08-20 RX ORDER — DIPHENHYDRAMINE HYDROCHLORIDE 50 MG/ML
12.5 INJECTION INTRAMUSCULAR; INTRAVENOUS EVERY 6 HOURS PRN
Status: DISCONTINUED | OUTPATIENT
Start: 2019-08-20 | End: 2019-08-22 | Stop reason: HOSPADM

## 2019-08-20 RX ORDER — EPHEDRINE SULFATE 50 MG/ML
INJECTION, SOLUTION INTRAMUSCULAR; INTRAVENOUS; SUBCUTANEOUS PRN
Status: DISCONTINUED | OUTPATIENT
Start: 2019-08-20 | End: 2019-08-20

## 2019-08-20 RX ORDER — DEXTROSE MONOHYDRATE, SODIUM CHLORIDE, AND POTASSIUM CHLORIDE 50; 1.49; 4.5 G/1000ML; G/1000ML; G/1000ML
INJECTION, SOLUTION INTRAVENOUS CONTINUOUS
Status: DISCONTINUED | OUTPATIENT
Start: 2019-08-20 | End: 2019-08-22 | Stop reason: HOSPADM

## 2019-08-20 RX ADMIN — HYDROMORPHONE HYDROCHLORIDE 0.25 MG: 1 INJECTION, SOLUTION INTRAMUSCULAR; INTRAVENOUS; SUBCUTANEOUS at 16:21

## 2019-08-20 RX ADMIN — HYDROMORPHONE HYDROCHLORIDE 0.5 MG: 1 INJECTION, SOLUTION INTRAMUSCULAR; INTRAVENOUS; SUBCUTANEOUS at 18:28

## 2019-08-20 RX ADMIN — HYDROMORPHONE HYDROCHLORIDE 0.25 MG: 1 INJECTION, SOLUTION INTRAMUSCULAR; INTRAVENOUS; SUBCUTANEOUS at 16:09

## 2019-08-20 RX ADMIN — Medication 5 MG: at 13:39

## 2019-08-20 RX ADMIN — ASPIRIN 325 MG: 325 TABLET, DELAYED RELEASE ORAL at 18:27

## 2019-08-20 RX ADMIN — POTASSIUM CHLORIDE, DEXTROSE MONOHYDRATE AND SODIUM CHLORIDE: 150; 5; 450 INJECTION, SOLUTION INTRAVENOUS at 17:15

## 2019-08-20 RX ADMIN — OXYCODONE HYDROCHLORIDE 5 MG: 5 TABLET ORAL at 21:43

## 2019-08-20 RX ADMIN — SODIUM CHLORIDE, POTASSIUM CHLORIDE, SODIUM LACTATE AND CALCIUM CHLORIDE: 600; 310; 30; 20 INJECTION, SOLUTION INTRAVENOUS at 14:15

## 2019-08-20 RX ADMIN — Medication 5 MG: at 15:06

## 2019-08-20 RX ADMIN — SENNOSIDES AND DOCUSATE SODIUM 2 TABLET: 8.6; 5 TABLET ORAL at 21:43

## 2019-08-20 RX ADMIN — ONDANSETRON 4 MG: 2 INJECTION INTRAMUSCULAR; INTRAVENOUS at 14:59

## 2019-08-20 RX ADMIN — PROPOFOL 100 MG: 10 INJECTION, EMULSION INTRAVENOUS at 13:05

## 2019-08-20 RX ADMIN — GLYCOPYRROLATE 0.4 MG: 0.2 INJECTION, SOLUTION INTRAMUSCULAR; INTRAVENOUS at 15:10

## 2019-08-20 RX ADMIN — KETOROLAC TROMETHAMINE 15 MG: 30 INJECTION, SOLUTION INTRAMUSCULAR at 15:51

## 2019-08-20 RX ADMIN — CEFAZOLIN SODIUM 2 G: 2 INJECTION, SOLUTION INTRAVENOUS at 13:20

## 2019-08-20 RX ADMIN — OXYCODONE HYDROCHLORIDE 10 MG: 10 TABLET, FILM COATED, EXTENDED RELEASE ORAL at 11:54

## 2019-08-20 RX ADMIN — VECURONIUM BROMIDE 2 MG: 1 INJECTION, POWDER, LYOPHILIZED, FOR SOLUTION INTRAVENOUS at 13:39

## 2019-08-20 RX ADMIN — VECURONIUM BROMIDE 2 MG: 1 INJECTION, POWDER, LYOPHILIZED, FOR SOLUTION INTRAVENOUS at 14:33

## 2019-08-20 RX ADMIN — FENTANYL CITRATE 50 MCG: 50 INJECTION, SOLUTION INTRAMUSCULAR; INTRAVENOUS at 13:05

## 2019-08-20 RX ADMIN — VECURONIUM BROMIDE 6 MG: 1 INJECTION, POWDER, LYOPHILIZED, FOR SOLUTION INTRAVENOUS at 13:05

## 2019-08-20 RX ADMIN — GABAPENTIN 600 MG: 600 TABLET, FILM COATED ORAL at 21:43

## 2019-08-20 RX ADMIN — Medication 10 MG: at 14:23

## 2019-08-20 RX ADMIN — SODIUM CHLORIDE, POTASSIUM CHLORIDE, SODIUM LACTATE AND CALCIUM CHLORIDE: 600; 310; 30; 20 INJECTION, SOLUTION INTRAVENOUS at 11:15

## 2019-08-20 RX ADMIN — CEFAZOLIN 1 G: 1 INJECTION, POWDER, FOR SOLUTION INTRAMUSCULAR; INTRAVENOUS at 21:43

## 2019-08-20 RX ADMIN — FENTANYL CITRATE 50 MCG: 50 INJECTION, SOLUTION INTRAMUSCULAR; INTRAVENOUS at 13:45

## 2019-08-20 RX ADMIN — SODIUM CHLORIDE 1 G: 9 INJECTION, SOLUTION INTRAVENOUS at 15:00

## 2019-08-20 RX ADMIN — ACETAMINOPHEN 975 MG: 325 TABLET, FILM COATED ORAL at 18:27

## 2019-08-20 RX ADMIN — NEOSTIGMINE METHYLSULFATE 3 MG: 1 INJECTION, SOLUTION INTRAVENOUS at 15:10

## 2019-08-20 RX ADMIN — DEXAMETHASONE SODIUM PHOSPHATE 4 MG: 4 INJECTION, SOLUTION INTRA-ARTICULAR; INTRALESIONAL; INTRAMUSCULAR; INTRAVENOUS; SOFT TISSUE at 13:20

## 2019-08-20 RX ADMIN — GABAPENTIN 600 MG: 600 TABLET, FILM COATED ORAL at 18:33

## 2019-08-20 RX ADMIN — SODIUM CHLORIDE 1 G: 9 INJECTION, SOLUTION INTRAVENOUS at 13:27

## 2019-08-20 RX ADMIN — Medication 5 MG: at 14:19

## 2019-08-20 RX ADMIN — PROPOFOL 50 MG: 10 INJECTION, EMULSION INTRAVENOUS at 13:10

## 2019-08-20 ASSESSMENT — MIFFLIN-ST. JEOR: SCORE: 1298.61

## 2019-08-20 ASSESSMENT — ENCOUNTER SYMPTOMS
SEIZURES: 0
DYSRHYTHMIAS: 0

## 2019-08-20 ASSESSMENT — ACTIVITIES OF DAILY LIVING (ADL): ADLS_ACUITY_SCORE: 16

## 2019-08-20 ASSESSMENT — COPD QUESTIONNAIRES: COPD: 0

## 2019-08-20 ASSESSMENT — LIFESTYLE VARIABLES: TOBACCO_USE: 0

## 2019-08-20 NOTE — OR NURSING
1550hr - [facility] now rounding on pt; pt reports persisting moderate-severe pain post Toradol 15mg IV dose.  V.O: Give IV Dilaudid 0.25mg now and okay to repeat dose x1 if needed.

## 2019-08-20 NOTE — ANESTHESIA CARE TRANSFER NOTE
Patient: Matthew Branch    Procedure(s):  DIRECT ANTERIOR RIGHT TOTAL HIP ARTHROPLASTY    Diagnosis: DEGENERATIVE ARTHRITIS RIGHT HIP  Diagnosis Additional Information: No value filed.    Anesthesia Type:   General, ETT     Note:  Airway :Face Mask  Patient transferred to:PACU  Comments: Neuromuscular blockade reversed after TOF 4/4, spontaneous respirations, adequate tidal volumes, followed commands to voice, oropharynx suctioned with soft flexible catheter, extubated atraumatically, airway patent after extubation.  Oxygen via facemask at 6 liters per minute to PACU. Oxygen tubing connected to wall O2 in PACU, SpO2, NiBP, and EKG monitors and alarms on and functioning, report on patient's clinical status given to PACU RN, RN questions answered. Handoff Report: Identifed the Patient, Identified the Reponsible Provider, Reviewed the pertinent medical history, Discussed the surgical course, Reviewed Intra-OP anesthesia mangement and issues during anesthesia, Set expectations for post-procedure period and Allowed opportunity for questions and acknowledgement of understanding      Vitals: (Last set prior to Anesthesia Care Transfer)    CRNA VITALS  8/20/2019 1502 - 8/20/2019 1532      8/20/2019             Resp Rate (set):  10                Electronically Signed By: ZACHARIAH Lebron CRNA  August 20, 2019  3:32 PM

## 2019-08-20 NOTE — BRIEF OP NOTE
[facility]    Brief Operative Note    Pre-operative diagnosis: DEGENERATIVE ARTHRITIS RIGHT HIP  Post-operative diagnosis DEGENERATIVE ARTHRITIS RIGHT HIP  Procedure: Procedure(s):  DIRECT ANTERIOR RIGHT TOTAL HIP ARTHROPLASTY  Surgeon: Surgeon(s) and Role:     * Anatoly Marin MD - Primary     * Santiago Crandall PA-C - Assisting  Anesthesia: General   Estimated blood loss: 400 ml  Drains: None  Specimens: * No specimens in log *  Findings:   Advanced DJD right hip.  Complications: None.  Implants:    Implant Name Type Inv. Item Serial No.  Lot No. LRB No. Used   IMP SHELL BIOM G7 ACETAB PPS HESTER HOLE 58MM SZ G [id number] Total Joint Component/Insert IMP SHELL BIOM G7 ACETAB PPS HESTER HOLE 58MM SZ G [id number]  [employer] [id number] Right 1   IMP SCR ZIM 6.5X30MM ACET CUP SELF TAP -343-30 Metallic Hardware/[employer] IMP SCR ZIM 6.5X30MM ACET CUP SELF TAP -850-30  [employer] [id number] Right 1   IMP SCR ZIM 6.5X40MM ACET CUP SELF TAP -391-40 Metallic Hardware/[employer] IMP SCR ZIM 6.5X40MM ACET CUP SELF TAP -243-40  [employer] [id number] Right 1   G7 ACETABULAR LINER NEUTRAL 36MM HEAD SIZE    [employer] [id number] Right 1   IMP STEM ZIM TAPER KINECTIV M/L SZ 13.5 -013-00 Total Joint Component/Insert IMP STEM ZIM TAPER KINECTIV M/L SZ 13.5 -013-00  [employer] [id number] Right 1   IMP FEMORAL NECK ZIM MOD TAPER KINECTIV C -437-00 Total Joint Component/Insert IMP FEMORAL NECK ZIM MOD TAPER KINECTIV C -346-00  [employer] [id number] Right 1   IMP HEAD FEM ZIM BIOLOX DELTA CER 36MM +0 -105-02 Total Joint Component/Insert IMP HEAD FEM ZIM BIOLOX DELTA CER 36MM +0 -273-02  [employer] [id number] Right 1

## 2019-08-20 NOTE — ANESTHESIA PREPROCEDURE EVALUATION
Anesthesia Pre-Procedure Evaluation    Patient: Matthew Branch   MRN: [id number] : 1933          Preoperative Diagnosis: DEGENERATIVE ARTHRITIS RIGHT HIP    Procedure(s):  DIRECT ANTERIOR RIGHT TOTAL HIP ARTHROPLASTY (ZAID)^    Past Medical History:   Diagnosis Date     Arthritis     back, (L) hip, (L) knee     Dermatitis scalp     Foraminal stenosis of lumbar region      Hypertension      Left foot drop      Left renal mass     with resulting hydronephrosis     Neuropathic pain, leg     left     Numbness and tingling     drop foot (L) side with numbness     Prostate cancer (H)      Prostate cancer (H)      Thalassemia minor      Vitamin D deficiency      Vitamin D deficiency disease      Past Surgical History:   Procedure Laterality Date     ARTHROPLASTY HIP  2012    Procedure:ARTHROPLASTY HIP; LEFT TOTAL HIP ARTHROPLASTY (BIOMET); Surgeon:ROBERT JAMES; Location:[facility]     BACK SURGERY      2 back surgerys in      CHOLECYSTECTOMY       GI SURGERY      prostatectomy     LAMINECTOMY LUMBAR POSTERIOR MICROSCOPIC ONE LEVEL  2012    Procedure: LAMINECTOMY LUMBAR POSTERIOR MICROSCOPIC ONE LEVEL;  Lumbar 2-3 Laminectomy Decompression;  Surgeon: Keron Guido MD;  Location: [facility]     LAPAROSCOPIC PROSTATECTOMY         Anesthesia Evaluation     . Pt has had prior anesthetic. Type: General (MAC 4 = grade 1 view)           ROS/MED HX    ENT/Pulmonary:      (-) tobacco use, asthma, COPD and sleep apnea   Neurologic:     (+)neuropathy - Left foot drop, other neuro Foraminal stenosis   (-) seizures, CVA and TIA   Cardiovascular:     (+) hypertension----. : . . . :. . Previous cardiac testing Echodate:results:1. Ejection fraction is estimated to be between 60-65%.  2. Both atria are normal in size.  3. Aortic root is mildly dilated.  4. Ascending aorta is borderline dilated.  5. The study quality was good.  6. No comparison study available.date: results:ECG reviewed date:2019  "results:NSR, PAC x 1 date: results:         (-) CAD and arrhythmias   METS/Exercise Tolerance:     Hematologic:         Musculoskeletal:   (+) arthritis,  -       GI/Hepatic:        (-) GERD and liver disease   Renal/Genitourinary: Comment: Left renal mass     (-) renal disease   Endo:      (-) Type I DM and Type II DM   Psychiatric:         Infectious Disease:         Malignancy:   (+) Malignancy History of Prostate          Other:                          Physical Exam  Normal systems: dental    Airway   Mallampati: III  TM distance: >3 FB  Neck ROM: full    Dental     Cardiovascular   Rhythm and rate: regular      Pulmonary    breath sounds clear to auscultation            Lab Results   Component Value Date    HGB 8.9 (L) 01/27/2012     01/25/2012    POTASSIUM 3.3 (L) 12/19/2012    CHLORIDE 97 01/25/2012    CO2 28 01/25/2012    BUN 16 01/25/2012    CR 0.80 12/19/2012     (H) 01/25/2012    JULIANA 9.5 01/25/2012    PTT 32 01/25/2012    INR 1.63 (H) 01/28/2012       Preop Vitals  BP Readings from Last 3 Encounters:   07/09/19 118/71   08/25/17 102/74   04/14/15 134/72    Pulse Readings from Last 3 Encounters:   08/25/17 76   04/14/15 72   08/22/12 70      Resp Readings from Last 3 Encounters:   12/19/12 20   01/28/12 18    SpO2 Readings from Last 3 Encounters:   07/09/19 99%   08/25/17 96%   04/14/15 98%      Temp Readings from Last 1 Encounters:   07/09/19 36.6  C (97.8  F) (Oral)    Ht Readings from Last 1 Encounters:   08/25/17 1.74 m (5' 8.5\")      Wt Readings from Last 1 Encounters:   07/09/19 70.3 kg (155 lb)    Estimated body mass index is 23.22 kg/m  as calculated from the following:    Height as of 8/25/17: 1.74 m (5' 8.5\").    Weight as of 7/9/19: 70.3 kg (155 lb).       Anesthesia Plan      History & Physical Review  History and physical reviewed and following examination; no interval change.    ASA Status:  2 .    NPO Status:  > 8 hours    Plan for General and ETT with Intravenous and Propofol " induction. Maintenance will be Balanced.    PONV prophylaxis:  Ondansetron (or other 5HT-3) and Dexamethasone or Solumedrol       Postoperative Care  Postoperative pain management:  Multi-modal analgesia.      Consents  Anesthetic plan, risks, benefits and alternatives discussed with:  Patient..                 Bird Cam MD

## 2019-08-20 NOTE — PROGRESS NOTES
Admission medication history interview status for the 8/20/2019  admission is complete. See EPIC admission navigator for prior to admission medications     Medication history source reliability:Good    Medication history interview source(s):Patient    Medication history resources (including written lists, pill bottles, clinic record):mailed in list    Primary pharmacy.[facility]    Additional medication history information not noted on PTA med list :None    Time spent in this activity: 35 minutes    Prior to Admission medications    Medication Sig Last Dose Taking? Auth Provider   benazepril (LOTENSIN) 40 MG tablet Take 40 mg by mouth daily  8/20/2019 at 0630 Yes Reported, Patient   cholecalciferol (VITAMIN D) 1000 UNIT tablet Take 1 tablet by mouth daily. 8/15/2019 Yes Shahnaz Ricardo PA-C   gabapentin (NEURONTIN) 600 MG tablet Take 600 mg by mouth 3 times daily  8/20/2019 at 0630 Yes Reported, Patient   hydrochlorothiazide (HYDRODIURIL) 25 MG tablet Take 25 mg by mouth daily. 8/19/2019 at am Yes Reported, Patient   HYDROcodone-acetaminophen (NORCO) 5-325 MG tablet Take 1 tablet by mouth 4 times daily 8/20/2019 at 0630 Yes Reported, Patient   NIFEdipine osmotic (NIFEDICAL XL) 30 MG 24 hr tablet Take 30 mg by mouth daily. 8/20/2019 at 0630 Yes Reported, Patient   order for DME Equipment being ordered: Portia Marroquin MD

## 2019-08-20 NOTE — PLAN OF CARE
Arrived at 1700 from PACU. VSS, baseline left foot drop and numbness . Otherwise CMS intact. Pain well controlled with IV dilaudid. Tolerated clear liquid. Voiding per urinal. Alert and orient x 4. Dressing c/d/I.

## 2019-08-20 NOTE — ANESTHESIA POSTPROCEDURE EVALUATION
Patient: Matthew Branch    Procedure(s):  DIRECT ANTERIOR RIGHT TOTAL HIP ARTHROPLASTY    Diagnosis:DEGENERATIVE ARTHRITIS RIGHT HIP  Diagnosis Additional Information: No value filed.    Anesthesia Type:  General, ETT    Note:  Anesthesia Post Evaluation    Patient location during evaluation: Bedside  Patient participation: Able to fully participate in evaluation  Level of consciousness: awake and alert  Pain management: adequate  Airway patency: patent  Cardiovascular status: acceptable  Respiratory status: acceptable  Hydration status: acceptable  PONV: none             Last vitals:  Vitals:    08/20/19 1630 08/20/19 1640 08/20/19 1650   BP: (!) 142/86 (!) 142/86    Pulse: 78 80    Resp: 16 16 12   Temp:      SpO2: 99% 99% 99%         Electronically Signed By: Bird Cam MD  August 20, 2019  5:08 PM

## 2019-08-21 ENCOUNTER — APPOINTMENT (OUTPATIENT)
Dept: OCCUPATIONAL THERAPY | Facility: CLINIC | Age: 84
DRG: 470 | End: 2019-08-21
Attending: ORTHOPAEDIC SURGERY
Payer: COMMERCIAL

## 2019-08-21 ENCOUNTER — APPOINTMENT (OUTPATIENT)
Dept: PHYSICAL THERAPY | Facility: CLINIC | Age: 84
DRG: 470 | End: 2019-08-21
Attending: ORTHOPAEDIC SURGERY
Payer: COMMERCIAL

## 2019-08-21 LAB
GLUCOSE SERPL-MCNC: 127 MG/DL (ref 70–99)
HGB BLD-MCNC: 10.5 G/DL (ref 13.3–17.7)

## 2019-08-21 PROCEDURE — 97110 THERAPEUTIC EXERCISES: CPT | Mod: GP

## 2019-08-21 PROCEDURE — 25000125 ZZHC RX 250: Performed by: ORTHOPAEDIC SURGERY

## 2019-08-21 PROCEDURE — 97116 GAIT TRAINING THERAPY: CPT | Mod: GP

## 2019-08-21 PROCEDURE — 36415 COLL VENOUS BLD VENIPUNCTURE: CPT | Performed by: ORTHOPAEDIC SURGERY

## 2019-08-21 PROCEDURE — 97535 SELF CARE MNGMENT TRAINING: CPT | Mod: GO | Performed by: OCCUPATIONAL THERAPIST

## 2019-08-21 PROCEDURE — 25000132 ZZH RX MED GY IP 250 OP 250 PS 637: Performed by: ORTHOPAEDIC SURGERY

## 2019-08-21 PROCEDURE — 97165 OT EVAL LOW COMPLEX 30 MIN: CPT | Mod: GO | Performed by: OCCUPATIONAL THERAPIST

## 2019-08-21 PROCEDURE — 85018 HEMOGLOBIN: CPT | Performed by: ORTHOPAEDIC SURGERY

## 2019-08-21 PROCEDURE — 97530 THERAPEUTIC ACTIVITIES: CPT | Mod: GO | Performed by: OCCUPATIONAL THERAPIST

## 2019-08-21 PROCEDURE — 12000000 ZZH R&B MED SURG/OB

## 2019-08-21 PROCEDURE — 97530 THERAPEUTIC ACTIVITIES: CPT | Mod: GP

## 2019-08-21 PROCEDURE — 25000128 H RX IP 250 OP 636: Performed by: ORTHOPAEDIC SURGERY

## 2019-08-21 PROCEDURE — 97161 PT EVAL LOW COMPLEX 20 MIN: CPT | Mod: GP

## 2019-08-21 PROCEDURE — 25800030 ZZH RX IP 258 OP 636: Performed by: ORTHOPAEDIC SURGERY

## 2019-08-21 PROCEDURE — 82947 ASSAY GLUCOSE BLOOD QUANT: CPT | Performed by: ORTHOPAEDIC SURGERY

## 2019-08-21 RX ORDER — AMOXICILLIN 250 MG
1 CAPSULE ORAL 2 TIMES DAILY
Qty: 40 TABLET | Refills: 0 | Status: SHIPPED | OUTPATIENT
Start: 2019-08-21 | End: 2019-08-22

## 2019-08-21 RX ORDER — ASPIRIN 325 MG
325 TABLET, DELAYED RELEASE (ENTERIC COATED) ORAL DAILY
Qty: 50 TABLET | Refills: 0 | Status: SHIPPED | OUTPATIENT
Start: 2019-08-21 | End: 2019-08-22

## 2019-08-21 RX ORDER — LIDOCAINE HYDROCHLORIDE 20 MG/ML
10 JELLY TOPICAL ONCE
Status: COMPLETED | OUTPATIENT
Start: 2019-08-21 | End: 2019-08-21

## 2019-08-21 RX ORDER — OXYCODONE HYDROCHLORIDE 5 MG/1
5-10 TABLET ORAL
Qty: 100 TABLET | Refills: 0 | Status: SHIPPED | OUTPATIENT
Start: 2019-08-21 | End: 2021-05-12

## 2019-08-21 RX ADMIN — OXYCODONE HYDROCHLORIDE 10 MG: 5 TABLET ORAL at 05:14

## 2019-08-21 RX ADMIN — LIDOCAINE HYDROCHLORIDE 6 ML: 20 JELLY TOPICAL at 18:12

## 2019-08-21 RX ADMIN — GABAPENTIN 600 MG: 600 TABLET, FILM COATED ORAL at 22:11

## 2019-08-21 RX ADMIN — SENNOSIDES AND DOCUSATE SODIUM 1 TABLET: 8.6; 5 TABLET ORAL at 07:42

## 2019-08-21 RX ADMIN — NIFEDIPINE 30 MG: 30 TABLET, FILM COATED, EXTENDED RELEASE ORAL at 07:41

## 2019-08-21 RX ADMIN — ACETAMINOPHEN 975 MG: 325 TABLET, FILM COATED ORAL at 09:24

## 2019-08-21 RX ADMIN — OXYCODONE HYDROCHLORIDE 10 MG: 5 TABLET ORAL at 01:51

## 2019-08-21 RX ADMIN — OXYCODONE HYDROCHLORIDE 10 MG: 5 TABLET ORAL at 16:26

## 2019-08-21 RX ADMIN — OXYCODONE HYDROCHLORIDE 10 MG: 5 TABLET ORAL at 09:25

## 2019-08-21 RX ADMIN — SENNOSIDES AND DOCUSATE SODIUM 1 TABLET: 8.6; 5 TABLET ORAL at 20:32

## 2019-08-21 RX ADMIN — LISINOPRIL 40 MG: 40 TABLET ORAL at 07:42

## 2019-08-21 RX ADMIN — OXYCODONE HYDROCHLORIDE 10 MG: 5 TABLET ORAL at 12:37

## 2019-08-21 RX ADMIN — GABAPENTIN 600 MG: 600 TABLET, FILM COATED ORAL at 07:41

## 2019-08-21 RX ADMIN — MELATONIN 1000 UNITS: at 07:41

## 2019-08-21 RX ADMIN — OXYCODONE HYDROCHLORIDE 10 MG: 5 TABLET ORAL at 20:32

## 2019-08-21 RX ADMIN — ACETAMINOPHEN 975 MG: 325 TABLET, FILM COATED ORAL at 01:51

## 2019-08-21 RX ADMIN — ASPIRIN 325 MG: 325 TABLET, DELAYED RELEASE ORAL at 07:41

## 2019-08-21 RX ADMIN — POTASSIUM CHLORIDE, DEXTROSE MONOHYDRATE AND SODIUM CHLORIDE: 150; 5; 450 INJECTION, SOLUTION INTRAVENOUS at 01:51

## 2019-08-21 RX ADMIN — ACETAMINOPHEN 975 MG: 325 TABLET, FILM COATED ORAL at 17:42

## 2019-08-21 RX ADMIN — CEFAZOLIN 1 G: 1 INJECTION, POWDER, FOR SOLUTION INTRAMUSCULAR; INTRAVENOUS at 05:11

## 2019-08-21 RX ADMIN — GABAPENTIN 600 MG: 600 TABLET, FILM COATED ORAL at 16:26

## 2019-08-21 ASSESSMENT — ACTIVITIES OF DAILY LIVING (ADL)
ADLS_ACUITY_SCORE: 15
ADLS_ACUITY_SCORE: 14
PREVIOUS_RESPONSIBILITIES: DRIVING
ADLS_ACUITY_SCORE: 15
ADLS_ACUITY_SCORE: 15

## 2019-08-21 NOTE — PLAN OF CARE
A&Ox4. VSS. CMS intact. Dressing CDI. Baseline left foot drop and numbness. PRN PO oxycodone and scheduled tylenol for pain. Standing at bedside x3 to use urinal this shift. States that he feels like he needs to urinate when sitting up in bed. Bladder scanned this AM for 343 then voided 200 mL. Tolerating regular diet, denies nausea. Will continue to monitor.

## 2019-08-21 NOTE — PLAN OF CARE
VSS, CMS intact. Up with 1 and walker. Pain well managed with oxycodone and tylenol. Dressing c/d/I. Voiding per urinal and BR. Voiding frequently, bladder scanned for 695 post residual. Straight  Cath for 850 at 6:30pm.   A&OX4. Discharge home tomorrow.   37.3

## 2019-08-21 NOTE — PROGRESS NOTES
"[facility] Orthopedic Post-Op Progress Note    Matthew Branch MRN# [id number]   YOB: 1933 Age: 86 year old            Interval History:   1 Day Post-Op from Total hip arthoplasty (Right)  Doing well.  Continues to improve.  Pain is well-controlled, improving.  No fevers.  X-rays reviewed.  Starting physical therapy this AM.  Some issues with urinary retention over night, currently improving.            Physical Exam:   /78 (BP Location: Left arm)   Pulse 69   Temp 98.8  F (37.1  C) (Oral)   Resp 16   Ht 1.727 m (5' 8\")   Wt 64.4 kg (142 lb)   SpO2 97%   BMI 21.59 kg/m      Dressing currently in place.  Mild swelling.  Movement and sensation intact distal to surgical site.  Calves non-tender.           Data:     Hemoglobin   Date Value Ref Range Status   08/21/2019 10.5 (L) 13.3 - 17.7 g/dL Final   01/27/2012 8.9 (L) 13.3 - 17.7 g/dL Final            Assessment and Plan:    Assessment:   Post-operative day #1  Total hip arthoplasty (Right)     Doing well.  No excessive bleeding  Pain well-controlled.           Plan:   Continue current medical management  Start working with physical therapy  Continue to monitor urinary retention  Plan on discharge to home later today vs. tomorrow, pending continued pain management and progress with PT  Follow up with Dr. Marin in 2 weeks  Continue current DVT prophylaxis           Jamey Crandall PA-C    "

## 2019-08-21 NOTE — PROGRESS NOTES
08/21/19 1100   Quick Adds   Type of Visit Initial Occupational Therapy Evaluation   Living Environment   Lives With spouse   Living Arrangements house   Home Accessibility stairs within home   Number of Stairs, Within Home, Primary   (12)   Stair Railings, Within Home, Primary railing on right side (ascending)   Transportation Anticipated car, drives self;family or friend will provide   Living Environment Comment Pt. reports standard toilet/free-standing + walk-in shower;pt.reprots he has a shower chair/stool he can borrow.    Self-Care   Usual Activity Tolerance good   Current Activity Tolerance moderate   Regular Exercise No   Equipment Currently Used at Home cane, straight  (has FWW at home )   Activity/Exercise/Self-Care Comment Pt. reports he is typically inbdep. w/ I/ADL's;uses towel rack in bathroom to steady-self(rec. not use towel rack/rec.-- grab bar);Pt.'s spouse does all the cooking,cleaning and laundry. Pt. does drive at baseline.   Functional Level   Ambulation 1-->assistive equipment   Transferring 1-->assistive equipment   Toileting 0-->independent   Bathing 0-->independent   Dressing 0-->independent   Eating 0-->independent   Communication 0-->understands/communicates without difficulty   Swallowing 0-->swallows foods/liquids without difficulty   Cognition 0 - no cognition issues reported   Fall history within last six months no   Which of the above functional risks had a recent onset or change? ambulation;transferring;toileting;bathing;dressing   General Information   Onset of Illness/Injury or Date of Surgery - Date 08/20/19   Referring Physician Dr. Marin   Patient/Family Goals Statement Plans to discharge home, spouse assist   Additional Occupational Profile Info/Pertinent History of Current Problem Patient admitted on 8/20/19 and is now POD-1 from R JACOB with direct anterior approach, orders for WBAT on R LE.    Precautions/Limitations fall precautions   Weight-Bearing Status - RLE  weight-bearing as tolerated   General Observations Pt. lying supine, pain elevated at 6-7/10;agreeable to OT, pleasant and cooperative.   General Info Comments Pt. has supportive spouse, will be able to assist at home.    Cognitive Status Examination   Orientation orientation to person, place and time   Level of Consciousness alert   Follows Commands (Cognition) WNL   Cognitive Comment appears intact/baseline   Visual Perception   Visual Perception Comments no vision problems noted/reported   Sensory Examination   Sensory Comments baseline LLE /foot tingling   Pain Assessment   Patient Currently in Pain Yes, see Vital Sign flowsheet  (R hip=6-7/10)   Posture   Posture forward head position;protracted shoulders   Range of Motion (ROM)   ROM Comment BUE WNL/WFL   Strength   Strength Comments BUE WNL/WFL   Hand Strength   Hand Strength Comments WNL   Mobility   Bed Mobility Comments Overall min-mod. A   Transfer Skill: Sit to Stand   Level of [place]: Sit/Stand contact guard   Physical Assist/Nonphysical Assist: Sit/Stand verbal cues;1 person assist   Transfer Skill: Sit to Stand weight-bearing as tolerated   Assistive Device for Transfer: Sit/Stand rolling walker   Toilet Transfer   Toilet Transfer Comments free-standing toilet at home   Tub/Shower Transfer   Tub/Shower Transfer Comments walk-in shower/shower stool at home   Balance   Balance Comments impaired;overall CGA for room mobility;1 LOB    Lower Body Dressing   Level of [place]: Dress Lower Body minimum assist (75% patients effort)   Physical Assist/Nonphysical Assist: Dress Lower Body verbal cues;1 person assist   Assistive Device reacher   Toileting   Level of [place]: Toilet minimum assist (75% patients effort)   Grooming   Level of [place]: Grooming contact guard   Instrumental Activities of Daily Living (IADL)   Previous Responsibilities driving  (spouse does all the cooking,cleaning and laundry)   Activities of Daily Living Analysis  "  Impairments Contributing to Impaired Activities of Daily Living balance impaired;flexibility decreased;pain;ROM decreased;strength decreased  (decreased RLE strength/ROM)   General Therapy Interventions   Planned Therapy Interventions ADL retraining   Clinical Impression   Criteria for Skilled Therapeutic Interventions Met yes, treatment indicated   OT Diagnosis Decline in ADL performance   Influenced by the following impairments pain,impaired balance, decreased flexibility , decreased ROM/strength RLE   Assessment of Occupational Performance 3-5 Performance Deficits   Identified Performance Deficits Currently below baseline w/ dressing,bathing, toileting, grooming   Clinical Decision Making (Complexity) Low complexity   Therapy Frequency Daily   Predicted Duration of Therapy Intervention (days/wks) 2-3 days   Anticipated Equipment Needs at Discharge bath sponge;bathing equipment;reacher;long shoe horn;sock aide;raised toilet seat;other (see comments)  (RTS w/armrests)   Anticipated Discharge Disposition Home;Home with Assist   Risks and Benefits of Treatment have been explained. Yes   Patient, Family & other staff in agreement with plan of care Yes   [facility] TM \"6 Clicks\"   2016, Trustees of [facility], under license to [employer].  All rights reserved.   6 Clicks Short Forms Daily Activity Inpatient Short Form   [facility]  \"6 Clicks\" Daily Activity Inpatient Short Form   1. Putting on and taking off regular lower body clothing? 3 - A Little   2. Bathing (including washing, rinsing, drying)? 3 - A Little   3. Toileting, which includes using toilet, bedpan or urinal? 3 - A Little   4. Putting on and taking off regular upper body clothing? 4 - None   5. Taking care of personal grooming such as brushing teeth? 3 - A Little   6. Eating meals? 4 - None   Daily Activity Raw Score (Score out of 24.Lower scores equate to lower levels of function) 20   Total Evaluation Time   Total " Evaluation Time (Minutes) 12

## 2019-08-21 NOTE — PROGRESS NOTES
08/21/19 0800   Quick Adds   Type of Visit Initial PT Evaluation   Living Environment   Lives With spouse   Living Arrangements house   Home Accessibility stairs within home   Number of Stairs, Within Home, Primary   (12)   Stair Railings, Within Home, Primary railing on right side (ascending)   Self-Care   Usual Activity Tolerance good   Current Activity Tolerance moderate   Equipment Currently Used at Home cane, straight  (has FWW at home )   Functional Level Prior   Ambulation 1-->assistive equipment   Transferring 1-->assistive equipment   Toileting 0-->independent   Bathing 0-->independent   Communication 0-->understands/communicates without difficulty   Swallowing 0-->swallows foods/liquids without difficulty   Cognition 0 - no cognition issues reported   Fall history within last six months no   Which of the above functional risks had a recent onset or change? ambulation;transferring   General Information   Onset of Illness/Injury or Date of Surgery - Date 08/20/19   Referring Physician Anatoly Marin MD   Patient/Family Goals Statement Discharge home with spouse    Pertinent History of Current Problem (include personal factors and/or comorbidities that impact the POC) Patient admitted on 8/20/19 and is now POD-1 from R JACOB with direct anterior approach, orders for WBAT on R LE.    Precautions/Limitations fall precautions   Weight-Bearing Status - LLE full weight-bearing   Weight-Bearing Status - RLE weight-bearing as tolerated   General Observations Patient supine in bed upon arrival of therapist, agreeable to working with PT    Cognitive Status Examination   Orientation orientation to person, place and time   Level of Consciousness alert   Follows Commands and Answers Questions 100% of the time;able to follow multistep instructions   Pain Assessment   Patient Currently in Pain Yes, see Vital Sign flowsheet  (not rated on scale of 0-10)   Integumentary/Edema   Integumentary/Edema Comments Dressing  "intact on R hip    Posture    Posture Forward head position   Range of Motion (ROM)   ROM Comment L LE ROM WNL, limited AROM with L ankle dorsiflexion 2/2 to history of drop foot. R LE ROM WNL with exception at R hip 2/2 to pain   Strength   Strength Comments Not formally assessed but at least 3+/5 with functional transfers and gait   Bed Mobility   Bed Mobility Comments Supine>sit with supervision, time to complete    Transfer Skills   Transfer Comments Sit>stand from EOB with FWW and CGA   Gait   Gait Comments Patient ambulated 10 feet with FWW and CGA, step to gait, foot drop on L noted. Slow but steady.    Balance   Balance no deficits were identified   General Therapy Interventions   Planned Therapy Interventions bed mobility training;gait training;transfer training;home program guidelines;strengthening   Clinical Impression   Criteria for Skilled Therapeutic Intervention yes, treatment indicated   PT Diagnosis Impaired mobility and gait   Influenced by the following impairments pain, weakness   Functional limitations due to impairments bed mobility, transfers, gait, stairs   Clinical Presentation Stable/Uncomplicated   Clinical Presentation Rationale Based on PMH, current presentation, and social support    Clinical Decision Making (Complexity) Low complexity   Therapy Frequency 2x/day   Predicted Duration of Therapy Intervention (days/wks) 2 days   Anticipated Discharge Disposition Home with Assist   Risk & Benefits of therapy have been explained Yes   Patient, Family & other staff in agreement with plan of care Yes   [facility] TM \"6 Clicks\"   2016, Trustees of [facility], under license to [employer].  All rights reserved.   6 Clicks Short Forms Basic Mobility Inpatient Short Form   [facility] AM-PAC  \"6 Clicks\" V.2 Basic Mobility Inpatient Short Form   1. Turning from your back to your side while in a flat bed without using bedrails? 4 - None   2. Moving from lying on your back " to sitting on the side of a flat bed without using bedrails? 4 - None   3. Moving to and from a bed to a chair (including a wheelchair)? 3 - A Little   4. Standing up from a chair using your arms (e.g., wheelchair, or bedside chair)? 3 - A Little   5. To walk in hospital room? 3 - A Little   6. Climbing 3-5 steps with a railing? 3 - A Little   Basic Mobility Raw Score (Score out of 24.Lower scores equate to lower levels of function) 20   Total Evaluation Time   Total Evaluation Time (Minutes) 5

## 2019-08-21 NOTE — PLAN OF CARE
Discharge Planner OT     OT:Orders received, chart reviewed, OT evaluation completed/ treatment initiated. Patient is POD-1 from R JACOB with direct anterior approach, orders for WBAT on R LE and no hip precautions 2/2 to surgical approach. Patient lives in a house with his spouse with 12 steps to access main level with single hand rail. Patient is independent at baseline with SEC, intermittent use of FWW. Patient has h/o drop foot on L LE.  Pt. reports standard free-standing toilet(uses towel rack to steady self per pt.), walk-in shower, shower stool available.   Patient plan for discharge: Home w/spouse assist  Current status: Pt. reports pain at 6-7/10, needed overall min.A for supine-sit, mod. A for sit-supine;Pt. overall CGA for sit-stand using WW, vc's for hand placement. Pt.demo.room mobility w/ CGA, slightly unsteady, reports pain much worse then earlier w/ PT(due for pain meds. soon--nursing aware);Pt. stood for toileting w/ CGA, needed vc's fror safe WW use, demo. 1 LOB needed overall min. A to correct(LLE/h/o L foot drop);Pt. unable to complete toilet transfer(standard toilet) due to pain(wanted to trial as has at home):Completed commode-over-toilet transfer w/ CGA;Rec. RTS w/ armrests for home and issued DME handout. Pt. stood for a few minutes at sink w/ CGA to complete grooming/hyg. tasks. Pt. sat EOB for instruction in comp. techs./AE for LE dressing;Pt. used reacher to bal boxers, needed overall min-mod. A;Pt. able to get RLE threaded through shorts w/ overall CGA, declined to continue w/ task due to pain;Pt. stood to pull-up boxers w/ overall min. A for sit-stand, needed mod. A to pull clothing up over hips. Pt. completed stand -sit w/ CGA, returned to supine at end of session.Pt. currently limited by pain.   Barriers to return to prior living situation: Current level of assist, pain  Recommendations for discharge: Home w/ assist for ADL's(dressing,toileting, bathing) as needed.  Rationale for  recommendations: Anticipate pt. will continue to make good progress(once pain subsides), meet goals and be safe to discharge home w/assist from spouse.       Entered by: Donna Osman 08/21/2019 12:59 PM

## 2019-08-21 NOTE — DISCHARGE INSTRUCTIONS
TOTAL HIP REPLACEMENT TAKE HOME INSTRUCTIONS   Your surgeon will answer any questions about your progress. General guidelines for your care are listed below. Your surgeon may give additional instructions for your care at home. Please follow them carefully.    Activity Level  1. Physical activity may be resumed gradually according to your comfort level and your surgeon s instructions. Follow your exercise program as instructed by your therapist. Do exercises at least twice daily    Good Health Practices  1. Maintain an adequate fluid intake and eat a well balanced diet.  2. Be sure to include the basic food groups, such as dairy products, meat/fish, vegetables, and fruit. Each of these foods contributes to helping your wound heal and increasing your strength.  3. Surgery, decreased activity and pain medication all contribute to a decrease in bowel activity that can result in constipation. It is recommended that you increase your liquid intake, add fiber to your diet, increase activity, and decrease pain medication use. If you have any problems, notify your physician.  4. Notify your dentist of your total hip surgery and call your dentist one week before a dental appointment for antibiotics.    Things to Watch For  1. Check incision daily for increased redness, tenderness, swelling, or drainage along the incision line. If these occur, please notify your doctor. Also, call if you develop a fever above 101 .  2. Please notify your doctor if you experience any calf pain and/or if you have surgical pain not relieved by the pain medication prescribed by your doctor.        Rev 3/5/2019

## 2019-08-21 NOTE — PLAN OF CARE
Discharge Planner PT   Patient plan for discharge: Home with A from spouse as needed  Current status: Orders received, chart reviewed, PT evaluation completed and treatment initiated. Patient admitted on 8/20/19 and is now POD-1 from R JACOB with direct anterior approach, orders for WBAT on R LE and no hip precautions 2/2 to surgical approach. Patient lives in a house with his spouse with 12 steps to access main level with single hand rail. Patient is independent at baseline with SEC and intermittent use of FWW. Patient notes history of drop foot on L LE.      Patient supine in bed upon arrival of therapist, agreeable to working with PT. Educated on role of PT and PT POC, discussed no hip precautions and orders for WBAT on R LE. Completed supine JACOB exercises with cues for technique. Supine<>sit with supervision and time to complete, sit<>stand with CGA, needing a few attempts to complete, cues for hand placement. Patient ambulated 250 feet with use of FWW and CGA progressing to SBA; step to gait transitioning to step through gait. Drop foot noted on L LE, steady with completion. Patient in supine at end of session with all needs in reach, bed alarm on, and ice to R hip.   Barriers to return to prior living situation: Current level of A, stairs- not yet assessed   Recommendations for discharge: Home with A from spouse for stairs as needed  Rationale for recommendations: Patient mobilizing well with FWW and CGA to SBA. Anticipate with further skilled therapy intervention and stairs practice patient will progress to level of independence for safe discharge home with A from spouse for stair navigation as needed. Anticipate patient will meet all IP PT goals by POD-2 AM.        Entered by: Shyanne Mattson 08/21/2019 8:43 AM

## 2019-08-21 NOTE — OP NOTE
Procedure Date: 08/20/2019      PREOPERATIVE DIAGNOSIS:  Advanced degenerative arthritis, right hip.      POSTOPERATIVE DIAGNOSIS:  Advanced degenerative arthritis, right hip.      PROCEDURE:  Right direct anterior total hip arthroplasty.        SURGEON:  Anatoly Marin MD.      FIRST ASSISTANT:  Santiago Crandall PA-C.      PROCEDURE IN DETAIL:  The patient was brought to the operating room, given a general anesthetic.  He was placed supine on the radiolucent operating table and his left hip and left lower extremity were then prepped and draped in a sterile fashion.  An incision was made over the anterior aspect of the hip.  This was carried down through the subcutaneous tissues down to the fascia.  The fascia was incised longitudinally and the interval between the tensor fascia tyler and sartorius was developed deep.  The rectus muscle was reflected medially exposing the circumflex vessels, which were cauterized.  The hip capsule was exposed.  We excised the anterior capsule, exposing the femoral head and neck.  There were large osteophytes rimming the femoral head and neck consistent with preoperative x-rays.  An osteotomy was then made in the femoral neck at the level of our preoperatively templated osteotomy level.  Another osteotomy was made just below the femoral head and the neck and head fragments were then removed.  There were advanced degenerative changes with complete loss of articular cartilage over the weightbearing surface of both the femoral head and acetabulum consistent with his preoperative imaging studies.  Attention was then turned to the acetabulum.  The acetabulum was reamed up to 58 mm.  A 58 mm G7 cup was then impacted into the acetabulum.  This had satisfactory fixation which was supplemented with 2 screws, which had excellent fixation.  A standard highly cross-linked polyethylene liner to accept a size 36 head was then snapped into the acetabular component.  Attention was then turned to  the femur.  The piriformis was released, allowing excellent exposure of the opening in the femoral canal.  The canal was opened with a starter reamer and then lateralized with the lateralizing reamer, and then we sequentially broached the hip up to a size 13.5 M/L taper broach.  This appeared to fit nicely.  The broach was removed.  A real size 13.5 Kinectiv stem was then impacted into the femur.  This had excellent fixation.  We then trialed the hip with various neck lengths and offsets and it appeared that an extended offset +4 neck gave the best fit.  A real extended +4 neck with a size 36+0 ceramic head were then impacted onto the stem.  The hip was relocated.  The soft tissue tension appeared satisfactory.  The hip was stable through a full range of motion and the leg length and offset appeared to be restored.  The wound was then irrigated with a dilute solution of Betadine.  This was allowed to sit within the wound for 3 minutes and then was thoroughly irrigated from the wound with a liter of normal saline.  One gram of vancomycin powder was then placed in the wound and the fascia was closed with a running #1 Vicryl suture.  The skin was closed with 2-0 Vicryl subcutaneous sutures and a 3-0 Monocryl running subcuticular suture and Steri-Strips.  A sterile dressing was applied to the wound.  The patient was then awakened from anesthesia and transferred to postanesthesia recovery in satisfactory condition.  It should be noted that my assistant was necessary throughout the entire procedure to assist with retraction and positioning.         DESIREE BRISCOE MD             D: 2019   T: 2019   MT: PATRICE      Name:     CHARLY POTTS   MRN:      [id number]        Account:        [id number]   :      1933           Procedure Date: 2019      Document: [id number]

## 2019-08-22 ENCOUNTER — APPOINTMENT (OUTPATIENT)
Dept: OCCUPATIONAL THERAPY | Facility: CLINIC | Age: 84
DRG: 470 | End: 2019-08-22
Attending: ORTHOPAEDIC SURGERY
Payer: COMMERCIAL

## 2019-08-22 ENCOUNTER — APPOINTMENT (OUTPATIENT)
Dept: PHYSICAL THERAPY | Facility: CLINIC | Age: 84
DRG: 470 | End: 2019-08-22
Attending: ORTHOPAEDIC SURGERY
Payer: COMMERCIAL

## 2019-08-22 VITALS
SYSTOLIC BLOOD PRESSURE: 133 MMHG | BODY MASS INDEX: 21.52 KG/M2 | HEART RATE: 85 BPM | WEIGHT: 142 LBS | HEIGHT: 68 IN | RESPIRATION RATE: 16 BRPM | OXYGEN SATURATION: 94 % | TEMPERATURE: 98.7 F | DIASTOLIC BLOOD PRESSURE: 75 MMHG

## 2019-08-22 LAB
GLUCOSE SERPL-MCNC: 112 MG/DL (ref 70–99)
HGB BLD-MCNC: 10.6 G/DL (ref 13.3–17.7)

## 2019-08-22 PROCEDURE — 82947 ASSAY GLUCOSE BLOOD QUANT: CPT | Performed by: ORTHOPAEDIC SURGERY

## 2019-08-22 PROCEDURE — 25000132 ZZH RX MED GY IP 250 OP 250 PS 637: Performed by: ORTHOPAEDIC SURGERY

## 2019-08-22 PROCEDURE — 97530 THERAPEUTIC ACTIVITIES: CPT | Mod: GP | Performed by: PHYSICAL THERAPY ASSISTANT

## 2019-08-22 PROCEDURE — 36415 COLL VENOUS BLD VENIPUNCTURE: CPT | Performed by: ORTHOPAEDIC SURGERY

## 2019-08-22 PROCEDURE — 97110 THERAPEUTIC EXERCISES: CPT | Mod: GP | Performed by: PHYSICAL THERAPY ASSISTANT

## 2019-08-22 PROCEDURE — 97530 THERAPEUTIC ACTIVITIES: CPT | Mod: GO | Performed by: OCCUPATIONAL THERAPY ASSISTANT

## 2019-08-22 PROCEDURE — 97116 GAIT TRAINING THERAPY: CPT | Mod: GP | Performed by: PHYSICAL THERAPY ASSISTANT

## 2019-08-22 PROCEDURE — 85018 HEMOGLOBIN: CPT | Performed by: ORTHOPAEDIC SURGERY

## 2019-08-22 PROCEDURE — 97535 SELF CARE MNGMENT TRAINING: CPT | Mod: GO | Performed by: OCCUPATIONAL THERAPY ASSISTANT

## 2019-08-22 RX ORDER — AMOXICILLIN 250 MG
1 CAPSULE ORAL 2 TIMES DAILY
Qty: 40 TABLET | Refills: 0 | Status: SHIPPED | OUTPATIENT
Start: 2019-08-22 | End: 2019-08-22

## 2019-08-22 RX ORDER — AMOXICILLIN 250 MG
1 CAPSULE ORAL 2 TIMES DAILY
Qty: 40 TABLET | Refills: 0 | Status: SHIPPED | OUTPATIENT
Start: 2019-08-22 | End: 2021-05-12

## 2019-08-22 RX ORDER — ASPIRIN 325 MG
325 TABLET, DELAYED RELEASE (ENTERIC COATED) ORAL DAILY
Qty: 50 TABLET | Refills: 0 | Status: SHIPPED | OUTPATIENT
Start: 2019-08-22 | End: 2019-08-22

## 2019-08-22 RX ORDER — ASPIRIN 325 MG
325 TABLET, DELAYED RELEASE (ENTERIC COATED) ORAL DAILY
Qty: 50 TABLET | Refills: 0 | Status: SHIPPED | OUTPATIENT
Start: 2019-08-22 | End: 2021-05-12

## 2019-08-22 RX ADMIN — ACETAMINOPHEN 975 MG: 325 TABLET, FILM COATED ORAL at 00:42

## 2019-08-22 RX ADMIN — LISINOPRIL 40 MG: 40 TABLET ORAL at 09:22

## 2019-08-22 RX ADMIN — OXYCODONE HYDROCHLORIDE 10 MG: 5 TABLET ORAL at 06:39

## 2019-08-22 RX ADMIN — ASPIRIN 325 MG: 325 TABLET, DELAYED RELEASE ORAL at 09:22

## 2019-08-22 RX ADMIN — HYDROCHLOROTHIAZIDE 25 MG: 25 TABLET ORAL at 09:22

## 2019-08-22 RX ADMIN — MELATONIN 1000 UNITS: at 09:22

## 2019-08-22 RX ADMIN — GABAPENTIN 600 MG: 600 TABLET, FILM COATED ORAL at 09:21

## 2019-08-22 RX ADMIN — OXYCODONE HYDROCHLORIDE 10 MG: 5 TABLET ORAL at 03:31

## 2019-08-22 RX ADMIN — OXYCODONE HYDROCHLORIDE 10 MG: 5 TABLET ORAL at 09:33

## 2019-08-22 RX ADMIN — SENNOSIDES AND DOCUSATE SODIUM 2 TABLET: 8.6; 5 TABLET ORAL at 09:21

## 2019-08-22 RX ADMIN — METHOCARBAMOL 500 MG: 500 TABLET, FILM COATED ORAL at 10:35

## 2019-08-22 RX ADMIN — ACETAMINOPHEN 975 MG: 325 TABLET, FILM COATED ORAL at 09:21

## 2019-08-22 RX ADMIN — OXYCODONE HYDROCHLORIDE 10 MG: 5 TABLET ORAL at 00:45

## 2019-08-22 RX ADMIN — NIFEDIPINE 30 MG: 30 TABLET, FILM COATED, EXTENDED RELEASE ORAL at 09:22

## 2019-08-22 ASSESSMENT — ACTIVITIES OF DAILY LIVING (ADL)
ADLS_ACUITY_SCORE: 15

## 2019-08-22 NOTE — PLAN OF CARE
Discharge Planner OT   Patient plan for discharge: Home w/spouse assist  Current status: pt completed supine to sit EOB with Ralph for RLE to advance over to EOB. Ralph sit to stands, amb with FWW CGA/Ralph to/from bathroom, toilet transfer with RTS CGA, CGA standing at sink for ADLS. LE dressing Ralph without AE except socks/shoes which pt stated spouse will assist with. Pt had several episodes of RLE buckling when standing during clothing management.    Barriers to return to prior living situation: Current level of assist, pain  Recommendations for discharge: Home w/ assist for ADL's(dressing,toileting, bathing) as needed per plan established by the Occupational Therapist  Rationale for recommendations: pt to have assist from spouse for ADLS/IADLS.        Entered by: Sarah Conner 08/22/2019 9:02 AM

## 2019-08-22 NOTE — DISCHARGE SUMMARY
[facility]    Discharge Summary  Orthopedics    Date of Admission:  8/20/2019  Date of Discharge:  8/22/2019  Discharging Provider: Santiago Crandall PA-C  Date of Service: 08/22/19    Discharge Diagnoses   Status post total hip replacement, right    Procedure/Surgery Information   Procedure: Procedure(s):  DIRECT ANTERIOR RIGHT TOTAL HIP ARTHROPLASTY   Surgeon(s): Surgeon(s) and Role:     * Anatoly Marin MD - Primary     * Santiago Crandall PA-C - Assisting           History of Present Illness   Matthew Branch is a 86 year old male who presented with significant pain and degenerative changes in his right hip.    Hospital Course   Matthew Branch was admitted on 8/20/2019.  The following problems were addressed during his hospitalization:  Active Problems:    Status post total hip replacement, right      Post-operative pain control: included oxycodone and will be oxycodone on discharge.     Medications discontinued or adjusted during this hospitalization:  Start aspirin 325 mg daily for 6 weeks for DVT prophylaxis.    Antibiotics prescribed at discharge: None prescribed     Santiago Crandall PA-C, PA-C    Discharge Disposition   Discharged to home   Condition at discharge: Good    Unresulted Labs Ordered in the Past 30 Days of this Admission     No orders found from 7/21/2019 to 8/21/2019.          Primary Care Physician   Anatoly Aburto    Consultations This Hospital Stay   OCCUPATIONAL THERAPY ADULT IP CONSULT  PHYSICAL THERAPY ADULT IP CONSULT    Time Spent on this Encounter   I have spent less than 30 minutes on this discharge.    Discharge Orders      Reason for your hospital stay    Diagnosis: DJD Hip   Procedure: JACOB  Surgeon: Anatoly Marin MD  Patient underwent total hip replacement without complication. Patient's hospital stay included physical therapy and DVT prophylaxis. Patient will follow-up in the office 10-14 days post-op for wound check. Please  refer to chart for any other specifics of this hospital stay.    Jamey Crandall PA-C     Follow-up and recommended labs and tests     You will follow up with Dr. Marin or his physician assistant Jamey Crandall in 2 weeks after surgery.  Your recovery process and incision will be checked.     Activity    Your activity upon discharge: activity as tolerated     Wound care and dressings    Instructions to care for your wound at home: Your dressing will be changed before you leave the hospital.  Leave that dressing in place for 48 hours.  You may shower at that time, allowing water to run over the incision.  The steri-strips will eventually fall off, do not remove these until that time.  You may re-apply a clean dressing after the shower.  You may perform daily dressing changes at that point, keeping the incision clean and dry.    Do not allow the dressing to soak in water.  If the dressing becomes completely saturated, you may remove it and perform basic wound management by keeping the wound clean, dry, and covered.    Notify Dr. Marin's office if the dressing becomes completely saturated from drainage or blood from the wound, or if the wound or dressing is leaking..     Full Code     Diet    Follow this diet upon discharge: Orders Placed This Encounter      Advance Diet as Tolerated: Regular Diet Adult     Discharge Medications   Start aspirin 325 mg daily for 6 weeks for DVT prophylaxis.  Current Discharge Medication List      START taking these medications    Details   aspirin (ASA) 325 MG EC tablet Take 1 tablet (325 mg) by mouth daily  Qty: 50 tablet, Refills: 0    Associated Diagnoses: Status post total hip replacement, right      oxyCODONE (ROXICODONE) 5 MG tablet Take 1-2 tablets (5-10 mg) by mouth every 3 hours as needed  Qty: 100 tablet, Refills: 0    Associated Diagnoses: Status post total hip replacement, right      senna-docusate (SENOKOT-S/PERICOLACE) 8.6-50 MG tablet Take 1 tablet by mouth 2  times daily  Qty: 40 tablet, Refills: 0    Associated Diagnoses: Status post total hip replacement, right         CONTINUE these medications which have NOT CHANGED    Details   benazepril (LOTENSIN) 40 MG tablet Take 40 mg by mouth daily       cholecalciferol (VITAMIN D) 1000 UNIT tablet Take 1 tablet by mouth daily.  Qty: 100 tablet    Associated Diagnoses: Status post THR (total hip replacement)      gabapentin (NEURONTIN) 600 MG tablet Take 600 mg by mouth 3 times daily       hydrochlorothiazide (HYDRODIURIL) 25 MG tablet Take 25 mg by mouth daily.      NIFEdipine osmotic (NIFEDICAL XL) 30 MG 24 hr tablet Take 30 mg by mouth daily.      order for DME Equipment being ordered: rib belt  Qty: 1 Device, Refills: 0    Associated Diagnoses: Rib contusion, right, initial encounter         STOP taking these medications       HYDROcodone-acetaminophen (NORCO) 5-325 MG tablet Comments:   Reason for Stopping:             Allergies   Allergies   Allergen Reactions     Amitriptyline Hcl Other (See Comments)     Dry mouth     Bees Swelling     Local swelling to Hornet stings     Lamisil Rash     Data   Results for orders placed or performed during the hospital encounter of 08/20/19   XR Surgery SAVANNAH Fluoro L/T 5 Min w Stills    Narrative    SURGERY C-ARM FLUOROSCOPY LESS THAN 5 MINUTES WITH STILLS 8/20/2019  3:00 PM     COMPARISON: None.    HISTORY: Right direct anterior total hip arthroplasty.    NUMBER OF IMAGES ACQUIRED: 1    VIEWS: 1    FLUOROSCOPY TIME: 1 minutes.      Impression    IMPRESSION: Single AP view shows right total hip arthroplasty in the  expected location. Intraoperative intra-articular air is noted.  Vascular calcifications are seen.    TIFFANIE MARQUEZ MD   XR Pelvis w Hip Port Right 1 View    Narrative    PELVIS AND HIP PORTABLE RIGHT ONE VIEW   8/20/2019 4:40 PM     HISTORY:  Right direct anterior right hip.    COMPARISON: Intraoperative image from earlier today.    FINDINGS: Degenerative change at the  lower lumbar spine. Surgical  clips. Osteopenia suspected. Left total hip arthroplasty with mild  adjacent heterotopic ossification.      Impression    IMPRESSION: Placement of a total right hip arthroplasty.    FRANKIE BAE MD     Hemoglobin   Date Value Ref Range Status   08/22/2019 10.6 (L) 13.3 - 17.7 g/dL Final   08/21/2019 10.5 (L) 13.3 - 17.7 g/dL Final   ]  Last Basic Metabolic Panel:  Lab Results   Component Value Date     08/20/2019      Lab Results   Component Value Date    POTASSIUM 3.6 08/20/2019     Lab Results   Component Value Date    CHLORIDE 97 01/25/2012     Lab Results   Component Value Date    JULIANA 9.5 01/25/2012     Lab Results   Component Value Date    CO2 28 01/25/2012     Lab Results   Component Value Date    BUN 16 01/25/2012     Lab Results   Component Value Date    CR 0.71 08/20/2019     Lab Results   Component Value Date     08/22/2019

## 2019-08-22 NOTE — PLAN OF CARE
Pt is A&O x4. CMS intact, L foot drop baseline. Up w/ one assist. Voiding adequately. IV discontinued. Dressing changed, CDI. Pain well managed w/ oxycodone and robaxin. Reviewed discharge paperwork w/ patient and spouse. Pt getting meds [facility]. Discharge pt w/ script and belonging. No further questions asked.

## 2019-08-22 NOTE — PLAN OF CARE
Pt AO4.  A1 GB W, using urinal at bedside.  PRN Oxy for pain.  Regular diet.  Full code.  +CMS.  Dressing CDI.  Baseline LLE foot drop.  Possible discharge today.

## 2019-08-22 NOTE — PROGRESS NOTES
"[facility] Orthopedic Post-Op Progress Note    Matthew Branch MRN# [id number]   YOB: 1933 Age: 86 year old            Interval History:   2 Days Post-Op from Total hip arthoplasty (Right)  Doing well.  Continues to improve.  Pain is well-controlled.  Some continued discomfort with getting out of bed and sitting to standing.  No fevers.  Tolerating physical therapy well.            Physical Exam:   /75 (BP Location: Left arm)   Pulse 85   Temp 98.7  F (37.1  C) (Oral)   Resp 16   Ht 1.727 m (5' 8\")   Wt 64.4 kg (142 lb)   SpO2 94%   BMI 21.59 kg/m      Dressing currently in place.  Mild swelling.  Movement and sensation intact distal to surgical site.  Calves non-tender.           Data:     Hemoglobin   Date Value Ref Range Status   08/22/2019 10.6 (L) 13.3 - 17.7 g/dL Final   08/21/2019 10.5 (L) 13.3 - 17.7 g/dL Final            Assessment and Plan:    Assessment:   Post-operative day #2  Total hip arthoplasty (Right)     Doing well.  No excessive bleeding  Pain well-controlled.  Tolerating physical therapy and rehabilitation well.           Plan:   Continue current medical management  Continue working with physical therapy  Plan on discharge to home later today  Follow up with Dr. Marin in 2 weeks  Continue current DVT prophylaxis           Jamey Crandall PA-C    "

## 2019-08-22 NOTE — PLAN OF CARE
Discharge Planner PT   Patient plan for discharge: Home with A from spouse as needed  Current status: Pt performed bed mobility with min assist and increased time due to pain.  Sit to/from stand transfers with min assist.  Needs cues to stand tall as pt stands with flexed knees/hips.  Fair improvement noted with cues.  Gait training 10 ft x 1 and 200 ft x 1 using wheeled walker and CGA.  Difficulty weight-bearing fully on right LE and right knee sandrita at times.  Amb with forward flexed posture, flexed knees/hips, and leans to the left.  Performed 3 stairs x 1 trial using 1 rail and cane with min-mod assist, 3 stairs x 1 trial with both hands on 1 rail with min assist, and 1 platform step using wheeled walker and min assist.    Barriers to return to prior living situation: Current level of A, stairs, right knee buckling, fall risk  Recommendations for discharge: Home with A from spouse for all mobility and stairs per plan established by the PT.   Rationale for recommendations: Increased assist needed with mobility today due to right knee buckling and difficulty with weight-bearing on right due to pain.  Pt will require assist with all mobility at home due to right knee buckling at times.  Offered PM PT session so wife could be present but pt declined 2nd session.       Entered by: Kathie Bennett 08/22/2019 9:43 AM       Pt is planning to discharge home today with assist of wife and daughter.  PT goals not met.

## 2019-08-22 NOTE — PLAN OF CARE
A&Ox4. VSS. CMS intact. Dressing CDI. Pain managed with scheduled tylenol and PRN oxycodone. Voided 300 ml. IV saline locked. Up with 1, GB and walker. Plans to discharge home tomorrow. Will continue to monitor.

## 2021-05-12 ENCOUNTER — OFFICE VISIT (OUTPATIENT)
Dept: PHARMACY | Facility: PHYSICIAN GROUP | Age: 86
End: 2021-05-12
Payer: COMMERCIAL

## 2021-05-12 VITALS
BODY MASS INDEX: 22.76 KG/M2 | WEIGHT: 145 LBS | HEIGHT: 67 IN | HEART RATE: 80 BPM | DIASTOLIC BLOOD PRESSURE: 86 MMHG | SYSTOLIC BLOOD PRESSURE: 130 MMHG

## 2021-05-12 DIAGNOSIS — K59.03 DRUG-INDUCED CONSTIPATION: ICD-10-CM

## 2021-05-12 DIAGNOSIS — M21.372 LEFT FOOT DROP: ICD-10-CM

## 2021-05-12 DIAGNOSIS — G62.9 NEUROPATHY: Primary | ICD-10-CM

## 2021-05-12 DIAGNOSIS — L98.9 SKIN SORE: ICD-10-CM

## 2021-05-12 DIAGNOSIS — Z78.9 TAKES DIETARY SUPPLEMENTS: ICD-10-CM

## 2021-05-12 DIAGNOSIS — I10 BENIGN ESSENTIAL HYPERTENSION: ICD-10-CM

## 2021-05-12 PROCEDURE — 99605 MTMS BY PHARM NP 15 MIN: CPT | Performed by: PHARMACIST

## 2021-05-12 PROCEDURE — 99607 MTMS BY PHARM ADDL 15 MIN: CPT | Performed by: PHARMACIST

## 2021-05-12 RX ORDER — TRIAMCINOLONE ACETONIDE 1 MG/G
CREAM TOPICAL DAILY PRN
COMMUNITY
Start: 2021-05-12 | End: 2023-01-01

## 2021-05-12 RX ORDER — POLYETHYLENE GLYCOL 3350 17 G/17G
0.5 POWDER, FOR SOLUTION ORAL DAILY
COMMUNITY
End: 2021-10-12

## 2021-05-12 RX ORDER — HYDROCODONE BITARTRATE AND ACETAMINOPHEN 5; 325 MG/1; MG/1
0.5 TABLET ORAL
COMMUNITY
End: 2023-01-01

## 2021-05-12 ASSESSMENT — MIFFLIN-ST. JEOR: SCORE: 1286.35

## 2021-05-12 NOTE — PROGRESS NOTES
Medication Therapy Management (MTM) Encounter    ASSESSMENT:                            Medication Adherence/Access: No issues identified    Nerve Pain/Foot Drop: reviewed risks associated with chronic opioid use and rationale for dose reduction. Based on source of pain, suggest use of SNRI to help with the pain and topical lidocaine for additional relief. Since the duloxetine may take time to see an effect will wait 1-2 weeks before trying to drop down the hydrocodone as patient is hesitant about making a change. Gabapentin dose should be reduced as duloxetine is started as well given renal function, age and concern for side effects of gabapentin, patient hesistant to make a change to this today= based on renal function would be best to get to 1400mg/day divided up twice a day as recommended by  dosing. Using 600mg tablets could try tapering down over time to 600mg AM and 900mg HS, if able. Will need time to make this transition.     Hypertension: Patient is meeting blood pressure goal of < 140/90mmHg.    Constipation: Stable, can increase miralax in the future if needed.     Skin sores: given no improvement with topical steroid, suggest referral to dermatology for evaluation.     Supplements: Stable.    PLAN:                            1. Give me a call with the name of the dermatology place you are thinking of and we can put in a Dermatology referral.     2. Start duloxetine 30mg daily    3. Lidocaine ointment for foot ordered to the pharmacy.     4. Okay to keep other medication the same for now, but in 1-2 weeks would like to try to reduce to 3 or less Hydrocodone per day. Will work on cutting down on gabapentin as soon as able.       Follow-up: Return in 4 weeks (on 6/9/2021) for Phone call with MTM.     SUBJECTIVE/OBJECTIVE:                          Matthew Branch is a 88 year old male called for an initial visit. He was referred to me from .      Reason for visit: discuss pain  "management.    Allergies/ADRs: Reviewed in chart  Tobacco: He reports that he has never smoked. He has never used smokeless tobacco.  Alcohol: not currently using  Activity: walking 1 mile per day  Past Medical History: Reviewed in chart      Medication Adherence/Access: no issues reported    Nerve Pain/Foot Drop: taking hydrocodone 4 per day (had been up to 6 per day in the past). Gabapentin 600mg 4 times daily, with an extra 600mg tablet at bedtime (total of 3000mg per day). HX of 7 back surgeries. Walking 1 mile daily. Pain is worst in his left ankle. Has not tried other therapies. Finds the hydrocodone most helpful for the pain. Has been taking for many years. Does have constipation, using miralax to keep this in control.   CrCl ~43ml/min  EGFR=     Hypertension: Current medications include benazepril 40mg and nifedipine ER 30mg daily.  Patient does not self-monitor blood pressure.  Patient reports the following medication side effects: constipation (see below).    BP Readings from Last 3 Encounters:   05/12/21 130/86   08/22/19 133/75   07/09/19 118/71     Constipation: has miralax capful as needed and does not feel he needs to take this more often. No issues at this time.       Skin sores: has been using triamcinolone 0.1% but now stopped because it didn't seem to do much to help. Has not seen a dermatologist lately on this issue. Lesions on his scalp.     Supplements: Taking vitamin D 1000 units daily, denies issues. No lab results on file.       Today's Vitals: /86   Pulse 80   Ht 5' 7\" (1.702 m)   Wt 145 lb (65.8 kg)   BMI 22.71 kg/m    ----------------    I spent 45 minutes with this patient today (an extra 15 minutes was spent creating the Medication Action Plan). All changes were made via collaborative practice agreement with Dr. Sandoval. A copy of the visit note was provided to the patient's primary care provider.    The patient was given a summary of these recommendations.     Lisa" Gilmar, Pharm.D, [facility]  Medication Therapy Management Pharmacist  [phone]        Medication Therapy Recommendations  Neuropathy    Current Medication: DULoxetine (CYMBALTA) 30 MG capsule   Rationale: Synergistic therapy - Needs additional medication therapy - Indication   Recommendation: Start Medication - DULoxetine 30 MG capsule - 1 daily   Status: Accepted per CPA          Current Medication: Lidocaine 4 % OINT   Rationale: Synergistic therapy - Needs additional medication therapy - Indication   Recommendation: Start Medication - Lidocaine 4 % Gel - topical to foot for pain   Status: Accepted per CPA         Skin sore    Current Medication: triamcinolone (KENALOG) 0.1 % external cream   Rationale: Condition refractory to medication - Ineffective medication - Effectiveness   Recommendation: Discontinue Medication - referral to dermatology for alternative   Status: Accepted per CPA

## 2021-05-12 NOTE — LETTER
"        Date: 2021    Matthew Branch   [facility] [address]    Dear Mr. Branch,    Thank you for talking with me on 2021 about your health and medications. Medicare s MTM (Medication Therapy Management) program helps you understand your medications and use them safely.      This letter includes an action plan (Medication Action Plan) and medication list (Personal Medication List). The action plan has steps you should take to help you get the best results from your medications. The medication list will help you keep track of your medications and how to use them the right way.       Have your action plan and medication list with you when you talk with your doctors, pharmacists, and other healthcare providers in your care team.     Ask your doctors, pharmacists, and other healthcare providers to update the action plan and medication list at every visit.     Take your medication list with you if you go to the hospital or emergency room.     Give a copy of the action plan and medication list to your family or caregivers.     If you want to talk about this letter or any of the papers with it, please call   [phone].We look forward to working with you, your doctors, and other healthcare providers to help you stay healthy through the [employer] [place] MTM program.    Sincerely,  Lisa Schafer [facility]    Enclosed: Medication Action Plan and Personal Medication List    MEDICATION ACTION PLAN FOR Matthew Branch,  1933     This action plan will help you get the best results from your medications if you:   1. Read \"What we talked about.\"   2. Take the steps listed in the \"What I need to do\" boxes.   3. Fill in \"What I did and when I did it.\"   4. Fill in \"My follow-up plan\" and \"Questions I want to ask.\"     Have this action plan with you when you talk with your doctors, pharmacists, and other healthcare providers in your care team. Share this with your family or " caregivers too.  DATE PREPARED: 2021  What we talked about: nerve pain options                                                  What I need to do: Start duloxetine 30mg once daily in the morning for pain.       What I did and when I did it:                                              What we talked about: foot pain                                                  What I need to do: Try using lidocaine ointment on your foot to help the pain and work down on the need for the hydrocodone.        What I did and when I did it:                                               What we talked about: skin patches                                                  What I need to do: Let me know what dermatology office you wanted and we can send a referral for you.       What I did and when I did it:                                                 My follow-up plan:                 Questions I want to ask:              If you have any questions about your action plan, call Lisa Schafer [facility], Phone: [phone] , Monday-Friday 8-4:30pm.           PERSONAL MEDICATION LIST FOR Matthew Branch  1933     This medication list was made for you after we talked. We also used information from your doctor's chart.      Use blank rows to add new medications. Then fill in the dates you started using them.    Cross out medications when you no longer use them. Then write the date and why you stopped using them.    Ask your doctors, pharmacists, and other healthcare providers to update this list at every visit. Keep this list up-to-date with:       Prescription medications    Over the counter drugs     Herbals    Vitamins    Minerals      If you go to the hospital or emergency room, take this list with you. Share this with your family or caregivers too.     DATE PREPARED: 2021  Allergies or side effects: Amitriptyline hcl, Bees, and Lamisil     Medication:  BENAZEPRIL HCL 40 MG PO TABS      How I use it:  Take 40 mg by  mouth daily       Why I use it:  Blood Pressure    Prescriber:  Dr. Sandoval      Date I started using it:       Date I stopped using it:         Why I stopped using it:            Medication:  DULOXETINE HCL 30 MG PO CPEP      How I use it:  Take 1 capsule (30 mg) by mouth daily      Why I use it:  Nerve pain    Prescriber:   Dr. Sandoval      Date I started using it:       Date I stopped using it:         Why I stopped using it:            Medication:  GABAPENTIN 600 MG PO TABS      How I use it:  Take 600 mg by mouth 4 times daily and an extra 600mg at bedtime for (1200mg at bedtime).      Why I use it:  Nerve Pain    Prescriber:  Dr. Sandoval      Date I started using it:       Date I stopped using it:         Why I stopped using it:            Medication:  HYDROCODONE-ACETAMINOPHEN 5-325 MG PO TABS      How I use it:  Take 1 tablet by mouth every 6 hours as needed for severe pain      Why I use it:  Nerve Pain    Prescriber:  Dr. Sandoval      Date I started using it:       Date I stopped using it:         Why I stopped using it:            Medication:  LIDOCAINE 4 % EX OINT      How I use it:  Externally apply 1 g topically 3 times daily as needed (pain)      Why I use it:  Nerve Pain    Prescriber:   Dr. Sandoval      Date I started using it:       Date I stopped using it:         Why I stopped using it:            Medication:  NIFEDIPINE CR OSMOTIC 30 MG PO TB24      How I use it:  Take 30 mg by mouth daily.      Why I use it:  Blood Pressure    Prescriber:  Dr. Sandoval      Date I started using it:       Date I stopped using it:         Why I stopped using it:            Medication:  POLYETHYLENE GLYCOL 3350 17 GM/DOSE PO POWD      How I use it:  Take 1 capful by mouth as needed for constipation      Why I use it:  Constipation    Prescriber:  Patient Reported      Date I started using it:       Date I stopped using it:         Why I stopped using it:            Medication:  TRIAMCINOLONE ACETONIDE 0.1 % EX CREA       How I use it:  Apply topically 2-4 times daily to skin irritation.      Why I use it:  Skin spots    Prescriber:   Dr. Sandoval      Date I started using it:       Date I stopped using it:         Why I stopped using it:            Medication:  VITAMIN D3 (CHOLECALCIFEROL) 1000 UNIT PO TABS      How I use it:  Take 1 tablet by mouth daily.      Why I use it: General Health    Prescriber:  Patient Reported      Date I started using it:       Date I stopped using it:         Why I stopped using it:            Medication:         How I use it:         Why I use it:      Prescriber:         Date I started using it:       Date I stopped using it:         Why I stopped using it:            Medication:         How I use it:         Why I use it:      Prescriber:         Date I started using it:       Date I stopped using it:         Why I stopped using it:            Medication:         How I use it:         Why I use it:      Prescriber:         Date I started using it:       Date I stopped using it:         Why I stopped using it:              Other Information:     If you have any questions about your medication list, call Lisa Schafer [facility], Phone: [phone] , Monday-Friday 8-4:30pm.    According to the Paperwork Reduction Act of 1995, no persons are required to respond to a collection of information unless it displays a valid OMB control number. The valid OMB number for this information collection is 8766-1521. The time required to complete this information collection is estimated to average 40 minutes per response, including the time to review instructions, searching existing data resources, gather the data needed, and complete and review the information collection. If you have any comments concerning the accuracy of the time estimate(s) or suggestions for improving this form, please write to: CMS, Attn: EDUAR Reports Clearance Officer, [address] [place] [phone].

## 2021-06-09 ENCOUNTER — VIRTUAL VISIT (OUTPATIENT)
Dept: PHARMACY | Facility: PHYSICIAN GROUP | Age: 86
End: 2021-06-09
Payer: COMMERCIAL

## 2021-06-09 DIAGNOSIS — M21.372 LEFT FOOT DROP: ICD-10-CM

## 2021-06-09 DIAGNOSIS — G62.9 NEUROPATHY: Primary | ICD-10-CM

## 2021-06-09 PROCEDURE — 99607 MTMS BY PHARM ADDL 15 MIN: CPT | Performed by: PHARMACIST

## 2021-06-09 PROCEDURE — 99606 MTMS BY PHARM EST 15 MIN: CPT | Performed by: PHARMACIST

## 2021-06-09 NOTE — PROGRESS NOTES
Medication Therapy Management (MTM) Encounter    ASSESSMENT:                            Medication Adherence/Access: No issues identified    Nerve Pain/Foot Drop: Based on balancing his medications,side effects and risks he is having worse pain with the reduction of the hydrocodone change from last visit. May be too soon to see if duloxetine is making a difference yet. Gabapentin dose is too high for his age and renal function and he is willing to work down on this medication. He will need to get a corrected hydrocodone prescription when he is  Due for his refill in several weeks as he will need 4 per day until we are able to find a better balance of control to see if further dose reduction can be made.     PLAN:                            1. Duloxetine refill sent.     2. Decrease gabapentin to 4 times daily- 600mg 4 times daily (2400mg per day)    3. Can go back to his 4 hydrocodone daily for now, but will be continuing to work on options to try to and work down on this. Will need his updated prescription to reflect #120 tabs for the month.     Follow-up: Return in 3 weeks (on 6/30/2021) for Phone call with MTM.    SUBJECTIVE/OBJECTIVE:                          Matthew Branch is a 88 year old male called for a follow-up visit. He was referred to me from Dr. Sandoval.  Today's visit is a follow-up MTM visit from 5/12     Reason for visit: Medication follow up.    Allergies/ADRs: Reviewed in chart  Tobacco: He reports that he has never smoked. He has never used smokeless tobacco.  Alcohol: not currently using  Activity: walking 1 mile per day  Past Medical History: Reviewed in chart      Medication Adherence/Access: no issues reported    Nerve Pain/Foot Drop: Currently taking hydrocodone 5/325mg tabs 3 per day (had been up to 6 per day in the past, but more recently had been managing his pain well around 4 per day taking every 5 hours or so during the day time). Attempted to decrease the frequency over the last few  weeks to 3 per day and has been having a lot more pain in the afternoon and would really like to go back up to the 4 per day amount he had been managing on.     He did start Duloxetine 30mg - no negative feelings except some sluggish feeling noted with it at the start. He doesn't feel it is helping any of the pain that he has noticed.   He continues on high doses of gabapentin 600mg 4 times during the day and extra 600mg at bedtime (5x per day total = 3000mg).     Did not  the lidocaine from the pharmacy, he still may consider it.     HX of 7 back surgeries. Walking 1 mile daily. Pain is worst in his left ankle. Has not tried other therapies. Finds the hydrocodone most helpful for the pain. Has been taking for many years. Does have constipation, using miralax to keep this in control.   CrCl ~43ml/min  EGFR= 60ml/min/m2 in Sept 2020.      Today's Vitals: There were no vitals taken for this visit.  ----------------      I spent 28 minutes with this patient today. All changes were made via collaborative practice agreement with . A copy of the visit note was provided to the patient's primary care provider.    The patient declined a summary of these recommendations.     Lisa Schafer, Pharm.D, [facility]  Medication Therapy Management Pharmacist  [phone]      Telemedicine Visit Details  Type of service:  Telephone visit  Start Time: 11:00 AM  End Time: 11:28 AM  Originating Location (patient location): Home  Distant Location (provider location):  [facility]        Medication Therapy Recommendations  Neuropathy    Current Medication: gabapentin (NEURONTIN) 600 MG tablet   Rationale: Dose too high - Dosage too high - Safety   Recommendation: Decrease Dose - gabapentin 600 MG tablet - decrease to 4 times daily   Status: Accepted per CPA

## 2021-06-11 NOTE — PATIENT INSTRUCTIONS
Recommendations from today's MTM visit:                                                       1. Duloxetine refill sent.     2. Decrease gabapentin to 4 times daily- 600mg 4 times daily (2400mg per day)    3. Can go back to his 4 hydrocodone daily for now, but will be continuing to work on options to try to and work down on this. Will need his updated prescription to reflect #120 tabs for the month.     Follow-up: No follow-ups on file.    It was great to speak with you today.  I value your experience and would be very thankful for your time with providing feedback on our clinic survey. You may receive a survey via email or text message in the next few days.     To schedule another MTM appointment, please call the clinic directly or you may call the MTM scheduling line at [phone] or toll-free at [phone].     My Clinical Pharmacist's contact information:                                                      Please feel free to contact me with any questions or concerns you have.      Lisa Schafer, Pharm.D, [facility]  Medication Therapy Management Pharmacist  [phone]

## 2021-06-29 NOTE — PROGRESS NOTES
Medication Therapy Management (MTM) Encounter    ASSESSMENT:                            Medication Adherence/Access: No issues identified    Nerve Pain/Foot Drop: ongoing discussion on attempts to wean down on hydrocodone to minimize risks associated with long term use. He is ready to try to drop to 3 tablets per day again, will try to extend the time between taking the pills to 7 hours otherwise we did discuss using the 1/2 tablet midday and evening if spreading out the timing is not working due to pain flaring.     Constipation: recommend switching bisacodyl stimulant to get bowel movement today. If unable to get results patient to call clinic to possibly order enema or alternative.     PLAN:                            1. miralax 0.5 capful daily for background prevention of constipation plus the docusate 100mg twice daily.     2.  dulcolax tablets containing bisacodyl today- take 10mg.     3. Decrease hydrocodone to 3 tablets per day.     Follow-up: Return in 2 weeks (on 7/14/2021) for Phone call with MTM.    SUBJECTIVE/OBJECTIVE:                          Matthew Branch is a 88 year old male called for a follow-up visit. He was referred to me from Dr. Sandoval, had been previously seeing Dr. Aburto.  Today's visit is a follow-up MTM visit from 6/9     Reason for visit: Pain medication follow up. Was in the ER on 6/27 for fecal impaction.     Allergies/ADRs: Reviewed in chart  Tobacco: He reports that he has never smoked. He has never used smokeless tobacco.  Alcohol: not currently using  Activity: walking 1 mile per day  Past Medical History: Reviewed in chart    Medication Adherence/Access: no issues reported    Nerve Pain/Foot Drop: Currently taking hydrocodone 5/325mg tabs 4 tabs per day - attempted to go down to 3 per day but felt that his pain was much harder to manage. Highest point was on 6 per day. He was hoping to work down on some of the gabapentin first before needing to drop more on the  hydrocodone.     Attempted using Duloxetine 30mg for help with pain, tolerating well but not sure how effective it has been for him.     He continues on high doses of gabapentin 600mg 3 times during the day and extra 600mg at bedtime (4x per day total = 2400mg), down from 3000mg last visit). Tolerating the dose reduction at this time.     Did not  the lidocaine from the pharmacy, he still may consider it.     HX of 7 back surgeries. Walking 1 mile daily. Pain is worst in his left ankle. Has not tried other therapies. Finds the hydrocodone most helpful for the pain. Has been taking for many years. Does have constipation, see below.  CrCl ~43ml/min  EGFR= 60ml/min/m2 in Sept 2020.    Constipation: Has not had a bowel movement since Sunday after the enema in the ER.   Ducolax liquid - 60ml x1 time this morning, Magnesium hydroxide 1200mg/15ml   Monday and Tuesday docusate 100mg twice daily.   Has been holding off on Miralax. Prior to ER only using this as needed.       Today's Vitals: There were no vitals taken for this visit.  ----------------    I spent 27 minutes with this patient today. All changes were made via collaborative practice agreement with Dr. Sandoval. A copy of the visit note was provided to the patient's primary care provider.    The patient declined a summary of these recommendations.     Lisa Schafer, Pharm.D, [facility]  Medication Therapy Management Pharmacist  [phone]      Telemedicine Visit Details  Type of service:  Telephone visit  Start Time: 9:32 AM  End Time: 9:59 AM  Originating Location (patient location): Home  Distant Location (provider location):  [facility]        Medication Therapy Recommendations  Drug-induced constipation    Current Medication: bisacodyl (DULCOLAX) 5 MG EC tablet   Rationale: More effective medication available - Ineffective medication - Effectiveness   Recommendation: Change Medication - bisacodyl 5 MG EC tablet - 2 tabs for  faster relief as needed   Status: Accepted - no CPA Needed          Current Medication: polyethylene glycol (MIRALAX) 17 GM/Dose powder   Rationale: Frequency inappropriate - Dosage too low - Effectiveness   Recommendation: Increase Frequency - MiraLax 17 GM/SCOOP Powd - 0.5 capful daily   Status: Accepted per CPA

## 2021-06-30 ENCOUNTER — VIRTUAL VISIT (OUTPATIENT)
Dept: PHARMACY | Facility: PHYSICIAN GROUP | Age: 86
End: 2021-06-30
Payer: COMMERCIAL

## 2021-06-30 DIAGNOSIS — G62.9 NEUROPATHY: Primary | ICD-10-CM

## 2021-06-30 DIAGNOSIS — M21.372 LEFT FOOT DROP: ICD-10-CM

## 2021-06-30 DIAGNOSIS — K59.03 DRUG-INDUCED CONSTIPATION: ICD-10-CM

## 2021-06-30 PROCEDURE — 99606 MTMS BY PHARM EST 15 MIN: CPT | Performed by: PHARMACIST

## 2021-06-30 PROCEDURE — 99607 MTMS BY PHARM ADDL 15 MIN: CPT | Performed by: PHARMACIST

## 2021-06-30 RX ORDER — BISACODYL 5 MG/1
10 TABLET, DELAYED RELEASE ORAL DAILY PRN
COMMUNITY
End: 2021-10-12

## 2021-06-30 RX ORDER — DOCUSATE SODIUM 100 MG/1
100 CAPSULE, LIQUID FILLED ORAL 2 TIMES DAILY
COMMUNITY
End: 2021-10-12

## 2021-06-30 NOTE — PATIENT INSTRUCTIONS
Recommendations from today's MTM visit:                                                       1. miralax 0.5 capful daily for background prevention of constipation plus the docusate 100mg twice daily.     2.  dulcolax tablets containing bisacodyl today- take 10mg.     3. Decrease hydrocodone to 3 tablets per day.       Follow-up: Return in 2 weeks (on 7/14/2021) for Phone call with MTM.    It was great to speak with you today.  I value your experience and would be very thankful for your time with providing feedback on our clinic survey. You may receive a survey via email or text message in the next few days.     To schedule another MTM appointment, please call the clinic directly or you may call the MTM scheduling line at [phone] or toll-free at [phone].     My Clinical Pharmacist's contact information:                                                      Please feel free to contact me with any questions or concerns you have.      Lisa Schafer, Pharm.D, [facility]  Medication Therapy Management Pharmacist  [phone]

## 2021-07-14 ENCOUNTER — VIRTUAL VISIT (OUTPATIENT)
Dept: PHARMACY | Facility: PHYSICIAN GROUP | Age: 86
End: 2021-07-14
Payer: COMMERCIAL

## 2021-07-14 DIAGNOSIS — K59.03 DRUG-INDUCED CONSTIPATION: ICD-10-CM

## 2021-07-14 DIAGNOSIS — M21.372 LEFT FOOT DROP: ICD-10-CM

## 2021-07-14 DIAGNOSIS — G62.9 NEUROPATHY: Primary | ICD-10-CM

## 2021-07-14 PROCEDURE — 99607 MTMS BY PHARM ADDL 15 MIN: CPT | Performed by: PHARMACIST

## 2021-07-14 PROCEDURE — 99606 MTMS BY PHARM EST 15 MIN: CPT | Performed by: PHARMACIST

## 2021-07-14 RX ORDER — GABAPENTIN 400 MG/1
400 CAPSULE ORAL DAILY
COMMUNITY
End: 2021-08-18 | Stop reason: DRUGHIGH

## 2021-07-14 NOTE — PROGRESS NOTES
Medication Therapy Management (MTM) Encounter    ASSESSMENT:                            Medication Adherence/Access: No issues identified    Nerve Pain/Foot Drop: Decrease gabapentin slightly to 400mg with AM and keep 600mg three times later in the day.     Constipation: Stable.     PLAN:                            1. Decrease AM gabapentin to 400mg and continue 600mg at noon, 600mg 4pm and 600mg at bedtime.     2. Continue with reduced  hydrocodone dose of 3 per day.     Follow-up: Return in 4 weeks (on 8/11/2021) for Phone call with MTM.    SUBJECTIVE/OBJECTIVE:                          Matthew Branch is a 88 year old male called for a follow-up visit. He was referred to me from  (previously saw Dr. Aburto).  Today's visit is a follow-up MTM visit from 6/30     Reason for visit: Decreasing pain medication.    Allergies/ADRs: Reviewed in chart  Tobacco: He reports that he has never smoked. He has never used smokeless tobacco.  Alcohol: not currently using  Activity: walking 1 mile per day  Past Medical History: Reviewed in chart    Medication Adherence/Access: no issues reported    Nerve Pain/Foot Drop: Currently taking hydrocodone 5/325mg tabs - takes in the am and  Then noon second dose hydrocodone with gabapentin, at 4pm gabapentin plus APAP 500mg and then hydrocodone at 8pm with last gabapentin.      Highest point was on 6 per day of hydrocodone, has been willing to work down on this, but hesitant to go below 3 per day.  He was hoping to work down on some of the gabapentin first before needing to drop more on the hydrocodone.     Attempted using Duloxetine 30mg for help with pain, tolerating well but not sure how effective it has been for him.     He continues on high doses of gabapentin 600mg 3 times during the day and extra 600mg at bedtime (4x per day total = 2400mg), down from 3000mg last visit). Tolerating the dose reduction at this time, more willing to cut down on this vs additional hydrocodone  at this time.     Did not  the lidocaine from the pharmacy not interested.     HX of 7 back surgeries. Walking 1 mile daily. Pain is worst in his left ankle. Has not tried other therapies. Finds the hydrocodone most helpful for the pain. Has been taking for many years. Does have constipation, see below.  CrCl ~43ml/min  EGFR= 60ml/min/m2 in Sept 2020.    Constipation: Gentle lax daily,  Colace 1 twice daily. Going every other day now and doing well.     Today's Vitals: There were no vitals taken for this visit.  ----------------    I spent 20 minutes with this patient today. All changes were made via collaborative practice agreement with Dr. Sandoval. A copy of the visit note was provided to the patient's primary care provider.    The patient declined a summary of these recommendations.     Lisa Schafer, Pharm.D, BCACP  Medication Therapy Management Pharmacist  [phone]      Telemedicine Visit Details  Type of service:  Telephone visit  Start Time: 2:04 PM  End Time: 2:24 PM  Originating Location (patient location): Home  Distant Location (provider location):  [facility]       Medication Therapy Recommendations  Neuropathy    Current Medication: gabapentin (NEURONTIN) 400 MG capsule   Rationale: Dose too high - Dosage too high - Safety   Recommendation: Decrease Dose - gabapentin 600 MG tablet - decrease AM dose to 400mg ,keep noon, 4pm and hs at 600mg   Status: Accepted per CPA

## 2021-07-14 NOTE — PATIENT INSTRUCTIONS
Recommendations from today's MTM visit:                                                         1. Decrease AM gabapentin to 400mg and continue 600mg at noon, 600mg 4pm and 600mg at bedtime.     2. Continue with reduced  hydrocodone dose of 3 per day.       Follow-up: Return in 4 weeks (on 8/11/2021) for Phone call with MTM.    It was great to speak with you today.  I value your experience and would be very thankful for your time with providing feedback on our clinic survey. You may receive a survey via email or text message in the next few days.     To schedule another MTM appointment, please call the clinic directly or you may call the MTM scheduling line at [phone] or toll-free at [phone].     My Clinical Pharmacist's contact information:                                                      Please feel free to contact me with any questions or concerns you have.         Lisa Schafer, Pharm.D, [facility]  Medication Therapy Management Pharmacist  [phone]

## 2021-08-16 NOTE — PROGRESS NOTES
Medication Therapy Management (MTM) Encounter    ASSESSMENT:                            Medication Adherence/Access: No issues identified    Nerve Pain/Foot Drop: Patient may benefit from increasing duloxetine and decreasing gabapentin. Will also try utilizing Tylenol and topical lidocaine for synergistic pain control.    Constipation: Stable.     PLAN:                            1. Increase duloxetine to 60mg daily. You can take two 30mg capsules to use up your current supply. I also sent a prescription to your pharmacy for 60mg capsules.  2. Decrease gabapentin to 600mg 3 times daily.   3. Ok to use Tylenol Extra Strength (acetaminophen 500mg) 1-2 tablets up to 2 times daily. Maximum acetaminophen dose is 3000 mg/day from all sources, including your Norco (hydrocodone-acetaminophen).  4. Try using over-the-counter lidocaine patches or cream as needed.   5. Continue hydrocodone/acetaminophen at current dose of 3 tablets per day.    Follow-up: Return in 4 weeks (on 9/14/2021) for Medication Therapy Management, via phone.    SUBJECTIVE/OBJECTIVE:                          Matthew Branch is a 88 year old male called for a follow-up visit. He was referred to me from Dr. Sandoval.  Today's visit is a follow-up MTM visit from 7/14/21 with Lisa Schafer PharmD.     Reason for visit: Pain medication recheck.    Allergies/ADRs: Reviewed in chart  Past Medical History: Reviewed in chart  Tobacco: He reports that he has never smoked. He has never used smokeless tobacco.  Alcohol: not currently using    Medication Adherence/Access: no issues reported    Nerve Pain/Foot Drop: Currently taking Norco 5/325mg 3 tabs/24 hours, gabapentin 400mg at noon and 600mg 3 times daily, acetaminophen 500-1000mg once daily, and duloxetine 30mg daily. Decreased gabapentin dose at last MTM visit. Reports he doesn't feel as comfortable throughout course of the day.     At highest point was on Norco 6 tabs/day, has been willing to work down on  this, but is hesitant to go below 3 tabs/day.  He was hoping to work down on some of the gabapentin first before needing to drop more on the hydrocodone. Feels starting duloxetine has been helpful in allowing him to decrease Norco as much as he has.    He continues on high doses of gabapentin 600mg 3 times daily and extra 400mg at noon (4x per day total = 2200mg, down from 3000mg at initial MTM visit). Tolerating the dose reduction at this time, more willing to cut down on this vs additional hydrocodone at this time.     Hasn't tried lidocaine patches or cream yet, would be willing to try OTC.    History of 7 back surgeries. Walking 1 mile daily. Pain is worst in his left ankle. Finds the hydrocodone most helpful for the pain. Has been taking for many years. Does have constipation, see below.  Last CMP on 9/2/20: sCr 1.10 mg/dL and eGFR 60 mL/min/m2.  Estimated CrCl = 43.5 mL/min (based on ideal body weight).     Constipation: Currently taking Gentle Lax (bisacodyl) 10mg daily and Colace (docusate) 1 capsule twice daily. Having BM's every other day now and doing well.     Today's Vitals: There were no vitals taken for this visit.  ----------------    I spent 12 minutes with this patient today. All changes were made via collaborative practice agreement with Dr. Sandoval. A copy of the visit note was provided to the patient's primary care provider.    The patient was sent via clinic portal a summary of these recommendations.     Preethi Lawson, PharmD, BCACP  Medication Therapy Management Pharmacist  Pager: [phone]    Telemedicine Visit Details  Type of service:  Telephone visit  Start Time: 1107  End Time: 1119  Originating Location (patient location): Home  Distant Location (provider location):  [facility]     Medication Therapy Recommendations  Neuropathy    Current Medication: DULoxetine HCl 60 MG CSDR   Rationale: Dose too low - Dosage too low - Effectiveness   Recommendation: Increase  Dose - DULoxetine 60 MG capsule - Take 1 capsule by mouth daily.   Status: Accepted per CPA          Current Medication: gabapentin (NEURONTIN) 600 MG tablet   Rationale: Dose too high - Dosage too high - Safety   Recommendation: Decrease Dose - gabapentin 600 MG tablet - Take 1 tablet by mouth 3 times daily.   Status: Accepted per CPA          Current Medication: gabapentin (NEURONTIN) 600 MG tablet   Rationale: Synergistic therapy - Needs additional medication therapy - Indication   Recommendation: Start Medication - Aspercreme Lidocaine 4 % Ptch - Try OTC lidocaine patches/cream and Tylenol as needed   Status: Accepted - no CPA Needed

## 2021-08-17 ENCOUNTER — VIRTUAL VISIT (OUTPATIENT)
Dept: PHARMACY | Facility: PHYSICIAN GROUP | Age: 86
End: 2021-08-17
Payer: COMMERCIAL

## 2021-08-17 DIAGNOSIS — M21.372 LEFT FOOT DROP: ICD-10-CM

## 2021-08-17 DIAGNOSIS — K59.03 DRUG-INDUCED CONSTIPATION: ICD-10-CM

## 2021-08-17 DIAGNOSIS — G62.9 NEUROPATHY: Primary | ICD-10-CM

## 2021-08-17 PROCEDURE — 99606 MTMS BY PHARM EST 15 MIN: CPT | Performed by: PHARMACIST

## 2021-08-17 PROCEDURE — 99607 MTMS BY PHARM ADDL 15 MIN: CPT | Performed by: PHARMACIST

## 2021-08-18 NOTE — PATIENT INSTRUCTIONS
Recommendations from today's MTM visit:                                                    MTM (medication therapy management) is a service provided by a clinical pharmacist designed to help you get the most of out of your medicines.   Today we reviewed what your medicines are for, how to know if they are working, that your medicines are safe and how to make your medicine regimen as easy as possible.      1. Increase duloxetine to 60mg daily. You can take two 30mg capsules to use up your current supply. I also sent a prescription to your pharmacy for 60mg capsules.    2. Decrease gabapentin to 600mg 3 times daily.     3. Ok to use Tylenol Extra Strength (acetaminophen 500mg) 1-2 tablets up to 2 times daily. Maximum acetaminophen dose is 3000 mg/day from all sources, including your Norco (hydrocodone-acetaminophen).    4. Try using over-the-counter lidocaine patches or cream as needed.     5. Continue hydrocodone/acetaminophen at current dose of 3 tablets per day.    Follow-up: Return in 4 weeks (on 9/14/2021) for Medication Therapy Management, via phone.    It was great to speak with you today.  I value your experience and would be very thankful for your time with providing feedback on our clinic survey. You may receive a survey via email or text message in the next few days.     To schedule another MTM appointment, please call the clinic directly or you may call the MTM scheduling line at [phone] or toll-free at [phone].     My Clinical Pharmacist's contact information:                                                      Please feel free to contact me with any questions or concerns you have.      Preethi Lawson, PharmD, BCACP  Medication Therapy Management Pharmacist  Pager: [phone]

## 2021-09-14 ENCOUNTER — VIRTUAL VISIT (OUTPATIENT)
Dept: PHARMACY | Facility: PHYSICIAN GROUP | Age: 86
End: 2021-09-14
Payer: COMMERCIAL

## 2021-09-14 DIAGNOSIS — G62.9 NEUROPATHY: Primary | ICD-10-CM

## 2021-09-14 DIAGNOSIS — M21.372 LEFT FOOT DROP: ICD-10-CM

## 2021-09-14 DIAGNOSIS — K59.03 DRUG-INDUCED CONSTIPATION: ICD-10-CM

## 2021-09-14 PROCEDURE — 99607 MTMS BY PHARM ADDL 15 MIN: CPT | Performed by: PHARMACIST

## 2021-09-14 PROCEDURE — 99606 MTMS BY PHARM EST 15 MIN: CPT | Performed by: PHARMACIST

## 2021-09-14 NOTE — PATIENT INSTRUCTIONS
Recommendations from today's MTM visit:                                                    MTM (medication therapy management) is a service provided by a clinical pharmacist designed to help you get the most of out of your medicines.   Today we reviewed what your medicines are for, how to know if they are working, that your medicines are safe and how to make your medicine regimen as easy as possible.      1. Decrease gabapentin to 600mg 3 times daily (stop gabapentin 400mg at noon).    2. Ok to use Tylenol Extra Strength (acetaminophen 500mg) 1-2 tablets up to 2 times daily. Maximum acetaminophen dose is 3000 mg/day from all sources, including your Norco (hydrocodone/acetaminophen).    3. Continue Norco (hydrocodone/acetaminophen) at current dose of 3 tablets per day. At next visit we will discuss decreasing this further.    Follow-up: Return in 4 weeks (on 10/12/2021) for Medication Therapy Management, via phone.    It was great to speak with you today.  I value your experience and would be very thankful for your time with providing feedback on our clinic survey. You may receive a survey via email or text message in the next few days.     To schedule another MTM appointment, please call the clinic directly or you may call the MTM scheduling line at [phone] or toll-free at [phone].     My Clinical Pharmacist's contact information:                                                      Please feel free to contact me with any questions or concerns you have.      Preethi Lawson, PharmD, [facility]  Medication Therapy Management Pharmacist  Pager: [phone]

## 2021-09-14 NOTE — PROGRESS NOTES
Medication Therapy Management (MTM) Encounter    ASSESSMENT:                            Medication Adherence/Access: No issues identified.    Nerve Pain/Foot Drop: Ongoing discussion on weaning down opioids and gabapentin to minimize safety risks. Today patient is ready to make a step down on pain meds. Discussed decreasing either gabapentin or Norco today, and patient opts for gabapentin. Gabapentin dose too high based on current kidney function. Eventually aiming for 1400 mg/day given in 2 divided doses as recommended by  dosing. Using 600mg tablets, could try tapering down over time to 600mg AM and 900mg HS, if able. Also counseled on safe acetaminophen dosing.    Constipation: Stable.    PLAN:                            1. Decrease gabapentin to 600mg 3 times daily (stop gabapentin 400mg at noon).    2. Ok to use Tylenol Extra Strength (acetaminophen 500mg) 1-2 tablets up to 2 times daily. Maximum acetaminophen dose is 3000 mg/day from all sources, including your Norco (hydrocodone-acetaminophen).    3. Continue hydrocodone/acetaminophen at current dose of 3 tablets per day. At next visit we will discuss decreasing this further.    Follow-up: Return in 4 weeks (on 10/12/2021) for Medication Therapy Management, via phone.    SUBJECTIVE/OBJECTIVE:                          Matthew Branch is a 88 year old male called for a follow-up visit. He was referred to me from Dr. Sandoval. Current PCP is Dr. Corey.  Today's visit is a follow-up MTM visit from 8/17/21.     Reason for visit: Recheck pain medications.    Allergies/ADRs: Reviewed in chart  Past Medical History: Reviewed in chart  Tobacco: He reports that he has never smoked. He has never used smokeless tobacco.  Alcohol: not currently using    Medication Adherence/Access: Issues reported - see below.     Nerve Pain/Foot Drop: Currently taking Norco 5/325mg 3 tabs per 24 hours (1 tab AM, 1/2 tab noon, 1/2 tab 4pm, 1 tab HS), gabapentin 400mg at noon and  600mg 3 times daily (forgot to decrease as directed at last MTM visit), acetaminophen 500-1000mg once daily, and duloxetine 60mg daily. Reports pain is controlled in the morning but feels uncomfortable in the afternoon.    At highest point was on Norco 6 tabs/day, has been willing to work down on this, but hesitant to go below 3 tabs/day.  He was hoping to work down on some of the gabapentin first before needing to drop more on the hydrocodone. Feels starting duloxetine has been helpful in allowing him to decrease Norco as much as he has.    He continues on high doses of gabapentin 600mg 3 times daily and 400mg at noon (4x/day = total 2200 mg, down from 3000 mg at initial MTM visit). Tolerating the dose reduction at this time, more willing to cut down on this vs additional hydrocodone at this time.     Hasn't tried lidocaine patches or cream yet, would be willing to try OTC.    History of 7 back surgeries. Walking 1 mile daily. Pain is worst in his left ankle. Finds hydrocodone most helpful for the pain. Has been taking for many years. Does have constipation, see below.  Last CMP on 8/30/21: sCr 1.25 mg/dL and eGFR 51 mL/min/m2.  Estimated CrCl = 36.3 mL/min (based on ideal body weight).   PCP is monitoring kidney function, plan to recheck in 6 months.    Constipation: Currently taking Gentle Lax (bisacodyl) 10mg daily and Colace (docusate) 1 capsule twice daily. Having BM's every other day now and doing well.     Today's Vitals: There were no vitals taken for this visit.     ----------------    I spent 20 minutes with this patient today. All changes were made via collaborative practice agreement with Dr. Corey. A copy of the visit note was provided to the patient's primary care provider.    The patient was mailed a summary of these recommendations.     Preethi Lawson, PharmD, [facility]  Medication Therapy Management Pharmacist  Pager: [phone]    Telemedicine Visit Details  Type of service:  Telephone visit  Start  Time: 1010  End Time: 1030  Originating Location (patient location): Home  Distant Location (provider location):  [facility]     Medication Therapy Recommendations  Neuropathy    Current Medication: gabapentin (NEURONTIN) 600 MG tablet   Rationale: Dose too high - Dosage too high - Safety   Recommendation: Decrease Dose - gabapentin 600 MG tablet - Take 1 tablet by mouth 3 times daily.   Status: Accepted per CPA

## 2021-10-12 ENCOUNTER — VIRTUAL VISIT (OUTPATIENT)
Dept: PHARMACY | Facility: PHYSICIAN GROUP | Age: 86
End: 2021-10-12
Payer: COMMERCIAL

## 2021-10-12 DIAGNOSIS — K59.03 DRUG-INDUCED CONSTIPATION: ICD-10-CM

## 2021-10-12 DIAGNOSIS — M21.372 LEFT FOOT DROP: ICD-10-CM

## 2021-10-12 DIAGNOSIS — G62.9 NEUROPATHY: Primary | ICD-10-CM

## 2021-10-12 PROCEDURE — 99606 MTMS BY PHARM EST 15 MIN: CPT | Performed by: PHARMACIST

## 2021-10-12 PROCEDURE — 99607 MTMS BY PHARM ADDL 15 MIN: CPT | Performed by: PHARMACIST

## 2021-10-12 RX ORDER — DULOXETIN HYDROCHLORIDE 30 MG/1
30 CAPSULE, DELAYED RELEASE ORAL AT BEDTIME
COMMUNITY
End: 2023-01-01

## 2021-10-12 RX ORDER — AMOXICILLIN 250 MG
3 CAPSULE ORAL DAILY
COMMUNITY
End: 2021-12-21

## 2021-10-12 NOTE — PATIENT INSTRUCTIONS
Recommendations from today's MTM visit:                                                    MTM (medication therapy management) is a service provided by a clinical pharmacist designed to help you get the most of out of your medicines.   Today we reviewed what your medicines are for, how to know if they are working, that your medicines are safe and how to make your medicine regimen as easy as possible.      1. Decrease hydrocodone/acetaminophen to 2 tablets per day - Take one-half tablet every morning, one-half tablet at 4pm, and one full tablet at bedtime.    2. Increase duloxetine to 60mg every morning and 30mg at bedtime.     3. Ok to use Tylenol Extra Strength (acetaminophen 500mg) 1-2 tablets up to 2 times daily. Maximum acetaminophen dose is 3000 mg/day from all sources, including your hydrocodone/acetaminophen.    4. Start senna-s 2 tablets once daily. May self-titrate dose if needed as directed by package labeling.    Follow-up: Return in 5 weeks (on 11/16/2021) for Medication Therapy Management, via phone.    It was great to speak with you today.  I value your experience and would be very thankful for your time with providing feedback on our clinic survey. You may receive a survey via email or text message in the next few days.     To schedule another MTM appointment, please call the clinic directly or you may call the MTM scheduling line at [phone] or toll-free at [phone].     My Clinical Pharmacist's contact information:                                                      Please feel free to contact me with any questions or concerns you have.      Preethi Lawson, PharmD, BCACP  Medication Therapy Management Pharmacist  Pager: [phone]

## 2021-10-12 NOTE — PROGRESS NOTES
Medication Therapy Management (MTM) Encounter    ASSESSMENT:                            Medication Adherence/Access: No issues identified    Nerve Pain/Foot Drop: Ongoing discussion on weaning down opioids and gabapentin to minimize safety risks. Today patient is ready to make a step down hydrocodone to 2 tabs/day. Opting to keep 1 full tab at bedtime as this is when he needs the greatest pain control and keep timing as 3 times daily in attempt to prevent pain flares in between doses. Patient may benefit from increasing duloxetine dose to balance out Norco dose reduction. Discussed doses > mg/day are off-label however can be beneficial. Counseled to notify clinic if side effects such as GI disturbance or sedation occur.    Constipation: Patient may benefit from trying senna-s, this is typically most effective for opioid-induced constipation.    PLAN:                            1. Decrease hydrocodone/acetaminophen to 2 tablets per day - Take one-half tablet every morning, one-half tablet at 4pm, and one full tablet at bedtime.  2. Increase duloxetine to 60mg every morning and 30mg at bedtime.   3. Ok to use Tylenol Extra Strength (acetaminophen 500mg) 1-2 tablets up to 2 times daily. Maximum acetaminophen dose is 3000 mg/day from all sources, including your hydrocodone/acetaminophen.  4. Start senna-s 2 tablets once daily. May self-titrate dose if needed as directed by package labeling.    Follow-up: Return in 5 weeks (on 11/16/2021) for Medication Therapy Management, via phone.    SUBJECTIVE/OBJECTIVE:                          Matthew Branch is a 88 year old male called for a follow-up visit. He was referred to me from Dr. Sandoval.  Today's visit is a follow-up MTM visit from 9/14/21.     Reason for visit: Recheck pain medications.    Allergies/ADRs: Reviewed in chart  Past Medical History: Reviewed in chart  Tobacco: He reports that he has never smoked. He has never used smokeless tobacco.  Alcohol: not  currently using    Medication Adherence/Access: no issues reported    Nerve Pain/Foot Drop: Currently taking hydrocodone/acetaminophen 5/325mg 3 tabs per 24 hours (1 tab AM, 1 tab at 4pm, 1 tab HS), gabapentin 600mg 3 times daily (at same times as Norco, decreased at last MTM visit), acetaminophen 1000mg 1-2 times daily (usually once at mid-day and occasionally at bedtime, does not exceed 3000 mg/day from all sources), and duloxetine 60mg at bedtime. Reports pain is controlled in the morning but feels uncomfortable in the afternoon.     At highest point was on Norco 6 tabs/day and gabapentin 3000 mg/day, has been willing to slowly work down on this as able. Feels starting duloxetine has been helpful in allowing him to decrease Norco as much as he has. Today he's willing to decrease Norco to 2 tabs/day, however would like to add or increase something else to compensate. Picked up topical OTC lidocaine, although hasn't tried it yet because feels his pain is more nerve related.    History of 7 back surgeries. Walking 1 mile daily. Pain is worst in his left ankle. Has been taking Norco for many years. Does have constipation, see below.  Last CMP on 8/30/21: sCr 1.25 mg/dL and eGFR 51 mL/min/m2.  Estimated CrCl = 36.3 mL/min (based on ideal body weight).   PCP is monitoring kidney function, plan to recheck in 6 months.    Constipation: Currently taking Dulcolax liquid (contains magnesium hydroxide) as needed. This helps induce a BM but also causes diarrhea after-effect. Tried regimen of Miralax plus bisacodyl but it was ineffective. Does have some senna-s on hand.    Today's Vitals: There were no vitals taken for this visit.     ----------------    I spent 30 minutes with this patient today. All changes were made via verbal approval with Dr. Sandoval. A copy of the visit note was provided to the patient's primary care provider.    The patient was mailed a summary of these recommendations.     Preethi Lawson, PharmD,  BCACP  Medication Therapy Management Pharmacist  Pager: [phone]    Telemedicine Visit Details  Type of service:  Telephone visit  Start Time: 1000  End Time: 1030  Originating Location (patient location): Home  Distant Location (provider location):  [facility]     Medication Therapy Recommendations  Drug-induced constipation    Current Medication: magnesium hydroxide (DULCOLAX) 400 MG/5ML suspension   Rationale: Condition refractory to medication - Ineffective medication - Effectiveness   Recommendation: Change Medication - Senna S 8.6-50 MG Tabs - Take 2 tablets by mouth daily.   Status: Accepted - no CPA Needed         Neuropathy    Current Medication: HYDROcodone-acetaminophen (NORCO) 5-325 MG tablet   Rationale: Unsafe medication for the patient - Adverse medication event - Safety   Recommendation: Decrease Dose - Decrease Norco to 2 tabs/day   Status: Accepted per Provider

## 2021-11-16 ENCOUNTER — VIRTUAL VISIT (OUTPATIENT)
Dept: PHARMACY | Facility: PHYSICIAN GROUP | Age: 86
End: 2021-11-16
Payer: COMMERCIAL

## 2021-11-16 DIAGNOSIS — K59.03 DRUG-INDUCED CONSTIPATION: ICD-10-CM

## 2021-11-16 DIAGNOSIS — M21.372 LEFT FOOT DROP: ICD-10-CM

## 2021-11-16 DIAGNOSIS — G62.9 NEUROPATHY: Primary | ICD-10-CM

## 2021-11-16 PROCEDURE — 99607 MTMS BY PHARM ADDL 15 MIN: CPT | Performed by: PHARMACIST

## 2021-11-16 PROCEDURE — 99606 MTMS BY PHARM EST 15 MIN: CPT | Performed by: PHARMACIST

## 2021-11-16 RX ORDER — ACETAMINOPHEN 500 MG
1000 TABLET ORAL 4 TIMES DAILY
COMMUNITY
End: 2023-01-01

## 2021-11-16 RX ORDER — BISACODYL 10 MG
10 SUPPOSITORY, RECTAL RECTAL DAILY PRN
COMMUNITY
End: 2021-12-21

## 2021-11-16 NOTE — PROGRESS NOTES
Medication Therapy Management (MTM) Encounter    ASSESSMENT:                            Medication Adherence/Access: No issues identified    Nerve Pain/Foot Drop: Ongoing discussing on weaning down opioids and gabapentin to minimize safety risks. Today patient would like to continue Norco at 2 tabs/day, however will spread out dosing to help with uncontrolled pain in the evenings. Today patient is willing to try reducing gabapentin to 1400 mg/day, which would be an appropriate dose for his renal function.    Constipation: Patient may benefit from using senna-s on a consistent basis. If that doesn't work, then suggest adding in Miralax as well. Aiming for a bowel regimen for constipation prevention rather than using as needed for rescue.    PLAN:                            1. Change hydrocodone/acetaminophen to one-half tablet 4 times daily (in the morning, at 4pm, at dinnertime, and at bedtime).  2. Decrease gabapentin to 1 tablet (600mg) every morning and 1.5 tablets (900mg) at bedtime.  3. Use senna-s 3 tablets at bedtime. If no still having constipation issues after a week, then add in Miralax 1 scoop daily. Ok to continue using Dulcolax liquid and suppositories as needed.      Follow-up: Return in 4 weeks (on 12/14/2021) for Medication Therapy Management, via phone.    SUBJECTIVE/OBJECTIVE:                          Matthew Branch is a 88 year old male called for a follow-up visit. He was referred to me from Dr. Sandoval.  Today's visit is a follow-up MTM visit from 10/12/21.    Reason for visit: Recheck pain medications.    Allergies/ADRs: Reviewed in chart  Past Medical History: Reviewed in chart  Tobacco: He reports that he has never smoked. He has never used smokeless tobacco.  Alcohol: not currently using    Medication Adherence/Access: no issues reported    Nerve Pain/Foot Drop: Currently taking hydrocodone/acetaminophen 5/325mg 2 tabs per 24 hours (half tab AM, half tab at 4pm, one full tab HS), gabapentin  600mg 3 times daily (at same times as Norco), acetaminophen 1000mg 1-2 times daily (usually once at mid-day and occasionally at bedtime, does not exceed 3000 mg/day from all sources), duloxetine 60mg every morning and 30mg at bedtime. At last MT visit we decreased Norco dose and increased duloxetine dose. Is having a hard time between 4pm and bedtime. Reports pain is controlled in the morning but feels uncomfortable in the afternoon. Patient reports no side effects.    At highest point was on Norco 6 tabs/day and gabapentin 3000 mg/day, has been willing to slowly work down on this as able. Feels starting duloxetine has been helpful in allowing him to decrease Norco as much as he has. Picked up topical OTC lidocaine, although hasn't tried it yet because feels his pain is more nerve related.    History of 7 back surgeries. Walking 1 mile daily. Pain is worst in his left ankle. Has been taking Norco for many years. Does have constipation, see below.  Last CMP on 8/30/21: sCr 1.25 mg/dL and eGFR 51 mL/min/m2.  Estimated CrCl = 36.3 mL/min (based on ideal body weight).   PCP is monitoring kidney function, plan to recheck in 6 months.    Constipation: Currently taking senna-s 3 tabs at bedtime as needed (intermittent use), Dulcolax liquid (contains magnesium hydroxide) as needed (about once every 3 days), and bisacodyl suppository as needed (rare use). Hasn't really tried using senna-s on a consistent basis, gets nervous if doesn't have a BM in 3 days. History of bowel obstruction last summer.    Today's Vitals: There were no vitals taken for this visit.     ----------------    I spent 30 minutes with this patient today. All changes were made via collaborative practice agreement with Dr. Sandoval. A copy of the visit note was provided to the patient's primary care provider.    The patient was emailed a summary of these recommendations.     Preethi Lawson, PharmD, BCACP  Medication Therapy Management Pharmacist  Pager:  [phone]    Telemedicine Visit Details  Type of service:  Telephone visit  Start Time: 1000  End Time: 1030  Originating Location (patient location): Home  Distant Location (provider location):  [facility] MTM     Medication Therapy Recommendations  Drug-induced constipation    Current Medication: senna-docusate (SENOKOT-S/PERICOLACE) 8.6-50 MG tablet   Rationale: Frequency inappropriate - Dosage too low - Effectiveness   Recommendation: Increase Frequency - Senna S 8.6-50 MG Tabs - Take 3 tablets by mouth at bedtime.   Status: Accepted - no CPA Needed         Left foot drop    Current Medication: HYDROcodone-acetaminophen (NORCO) 5-325 MG tablet   Rationale: Frequency inappropriate - Dosage too low - Effectiveness   Recommendation: Change Administration Time - HYDROcodone-acetaminophen 5-325 MG tablet - Take one-half tablet by mouth 4 times daily.   Status: Accepted per Provider         Neuropathy    Current Medication: gabapentin (NEURONTIN) 600 MG tablet   Rationale: Dose too high - Dosage too high - Safety   Recommendation: Decrease Dose - gabapentin 600 MG tablet - Take 1 tablet by mouth every morning and 1.5 tablets at bedtime.   Status: Accepted per CPA

## 2021-11-16 NOTE — PATIENT INSTRUCTIONS
Recommendations from today's MTM visit:                                                    MTM (medication therapy management) is a service provided by a clinical pharmacist designed to help you get the most of out of your medicines.   Today we reviewed what your medicines are for, how to know if they are working, that your medicines are safe and how to make your medicine regimen as easy as possible.      1. Change hydrocodone/acetaminophen to one-half tablet 4 times daily (in the morning, at 4pm, at dinnertime, and at bedtime).    2. Decrease gabapentin to 1 tablet (600mg) every morning and 1.5 tablets (900mg) at bedtime.    3. Use senna-s 3 tablets at bedtime. If no still having constipation issues after a week, then add in Miralax 1 capful daily. Ok to continue using Dulcolax liquid and suppositories as needed.      Follow-up: Return in 4 weeks (on 12/14/2021) for Medication Therapy Management, via phone.    It was great to speak with you today.  I value your experience and would be very thankful for your time with providing feedback on our clinic survey. You may receive a survey via email or text message in the next few days.     To schedule another MTM appointment, please call the clinic directly or you may call the MTM scheduling line at [phone] or toll-free at [phone].     My Clinical Pharmacist's contact information:                                                      Please feel free to contact me with any questions or concerns you have.      Preethi Lawson, PharmD, BCACP  Medication Therapy Management Pharmacist  Pager: [phone]

## 2021-12-20 NOTE — PROGRESS NOTES
"Medication Therapy Management (MTM) Encounter    ASSESSMENT:                            Medication Adherence/Access: No issues identified    Nerve Pain/Foot Drop: Patient is due for PCP follow-up and recommend rechecking kidney function/electrolytes at that time. Current gabapentin dose is appropriate for his renal function, however due for updated labs. Additionally duloxetine can cause hyponatremia in older adults and last sodium was on the low end of normal range.    Constipation: Stable.    PLAN:                            1. Continue current medications.  2. Schedule an office visit with Dr. Corey in the new year.  3. Placed future order for CMP recheck.    Follow-up: Return in 23 days (on 1/13/2022) for Primary Care Provider Visit.    SUBJECTIVE/OBJECTIVE:                          Matthew Branch is a 88 year old male called for a follow-up visit. He was referred to me from Dr. Sandoval.  Today's visit is a follow-up MTM visit from 11/16/21.    Reason for visit: Recheck pain medications.    Allergies/ADRs: Reviewed in chart  Past Medical History: Reviewed in chart  Tobacco: He reports that he has never smoked. He has never used smokeless tobacco.  Alcohol: not currently using    Medication Adherence/Access: no issues reported    Nerve Pain/Foot Drop: Currently taking hydrocodone/acetaminophen 5/325mg 2 tabs per 24 hours (half tab 4 times daily - morning, 4pm, dinnertime, bedtime), gabapentin 600mg every morning and 900mg at bedtime (dose decreased at last MTM visit), acetaminophen 1000mg 1-2 times daily (does not exceed 3000 mg/day from all sources), duloxetine 60mg every morning and 30mg at bedtime. Endorses issues with \"nerves jumping\" and shooting pain in left foot, especially in the evening and at bedtime. Overall he feels current pain control is unconfortable but manageable and is willing to continue working through it.    At highest point was on Norco 6 tabs/day and gabapentin 3000 mg/day, has been willing " to slowly work down on this as able. Feels starting duloxetine has been helpful in allowing him to decrease Norco as much as he has. Picked up topical OTC lidocaine, although hasn't tried it yet because feels his pain is more nerve related.    History of 7 back surgeries. Walking 1 mile daily. Pain is worst in his left ankle. Has been taking Norco for many years. Does have constipation, see below.  Last CMP on 8/30/21: sCr 1.25 mg/dL and eGFR 51 mL/min/m2.  Estimated CrCl = 36.3 mL/min (based on ideal body weight).   PCP is monitoring kidney function, plan to recheck in 6 months.    Constipation: Currently taking bisacodyl 3 tablets at bedtime as needed (uses if no BM in 3 days) and Fleets enema as needed (only if bisacodyl doesn't work). Feels this regimen works well to induce BM. No longer using senna-s or Miralax because didn't feel they helped at all. History of bowel obstruction last summer.    Today's Vitals: There were no vitals taken for this visit.     ----------------    I spent 20 minutes with this patient today. All changes were made via collaborative practice agreement with Dr. Corey. A copy of the visit note was provided to the patient's primary care provider.    The patient declined a summary of these recommendations.     Preethi Lawson, PharmD, BCACP  Medication Therapy Management Pharmacist  Pager: [phone]    Telemedicine Visit Details  Type of service:  Telephone visit  Start Time: 1100  End Time: 1120  Originating Location (patient location): Home  Distant Location (provider location):  [facility] MTM     Medication Therapy Recommendations  No medication therapy recommendations to display

## 2021-12-21 ENCOUNTER — VIRTUAL VISIT (OUTPATIENT)
Dept: PHARMACY | Facility: PHYSICIAN GROUP | Age: 86
End: 2021-12-21
Payer: COMMERCIAL

## 2021-12-21 DIAGNOSIS — K59.03 DRUG-INDUCED CONSTIPATION: ICD-10-CM

## 2021-12-21 DIAGNOSIS — M21.372 LEFT FOOT DROP: ICD-10-CM

## 2021-12-21 DIAGNOSIS — G62.9 NEUROPATHY: Primary | ICD-10-CM

## 2021-12-21 PROCEDURE — 99607 MTMS BY PHARM ADDL 15 MIN: CPT | Performed by: PHARMACIST

## 2021-12-21 PROCEDURE — 99606 MTMS BY PHARM EST 15 MIN: CPT | Performed by: PHARMACIST

## 2021-12-21 RX ORDER — BISACODYL 5 MG/1
15 TABLET, DELAYED RELEASE ORAL DAILY PRN
COMMUNITY
End: 2022-01-01

## 2021-12-21 NOTE — PATIENT INSTRUCTIONS
Recommendations from today's MTM visit:                                                    MTM (medication therapy management) is a service provided by a clinical pharmacist designed to help you get the most of out of your medicines.   Today we reviewed what your medicines are for, how to know if they are working, that your medicines are safe and how to make your medicine regimen as easy as possible.      1. Continue current medications.  2. Schedule an office visit with Dr. Corey in the new year.  3. Placed future order for kidney function/electrolyte lab recheck.    Follow-up: Return in 23 days (on 1/13/2022) for Primary Care Provider Visit.    It was great to speak with you today.  I value your experience and would be very thankful for your time with providing feedback on our clinic survey. You may receive a survey via email or text message in the next few days.     To schedule another MTM appointment, please call the clinic directly or you may call the MTM scheduling line at [phone] or toll-free at [phone].     My Clinical Pharmacist's contact information:                                                      Please feel free to contact me with any questions or concerns you have.      Preethi Lawson, PharmD, BCACP  Medication Therapy Management Pharmacist  Pager: [phone]

## 2022-01-01 ENCOUNTER — OFFICE VISIT (OUTPATIENT)
Dept: URGENT CARE | Facility: URGENT CARE | Age: 87
End: 2022-01-01
Payer: COMMERCIAL

## 2022-01-01 ENCOUNTER — VIRTUAL VISIT (OUTPATIENT)
Dept: PHARMACY | Facility: PHYSICIAN GROUP | Age: 87
End: 2022-01-01
Payer: COMMERCIAL

## 2022-01-01 VITALS
RESPIRATION RATE: 15 BRPM | WEIGHT: 140 LBS | DIASTOLIC BLOOD PRESSURE: 68 MMHG | BODY MASS INDEX: 21.22 KG/M2 | HEIGHT: 68 IN | TEMPERATURE: 97.9 F | HEART RATE: 78 BPM | OXYGEN SATURATION: 98 % | SYSTOLIC BLOOD PRESSURE: 118 MMHG

## 2022-01-01 DIAGNOSIS — Z78.9 TAKES DIETARY SUPPLEMENTS: ICD-10-CM

## 2022-01-01 DIAGNOSIS — M21.372 LEFT FOOT DROP: ICD-10-CM

## 2022-01-01 DIAGNOSIS — K59.03 DRUG-INDUCED CONSTIPATION: ICD-10-CM

## 2022-01-01 DIAGNOSIS — R21 RASH: ICD-10-CM

## 2022-01-01 DIAGNOSIS — G62.9 NEUROPATHY: Primary | ICD-10-CM

## 2022-01-01 DIAGNOSIS — D64.9 ANEMIA, UNSPECIFIED TYPE: ICD-10-CM

## 2022-01-01 DIAGNOSIS — I10 BENIGN ESSENTIAL HYPERTENSION: ICD-10-CM

## 2022-01-01 DIAGNOSIS — Z79.2 NEED FOR PROPHYLACTIC ANTIBIOTIC: ICD-10-CM

## 2022-01-01 DIAGNOSIS — L50.9 URTICARIA: Primary | ICD-10-CM

## 2022-01-01 DIAGNOSIS — Z91.030 BEE STING ALLERGY: ICD-10-CM

## 2022-01-01 PROCEDURE — 99607 MTMS BY PHARM ADDL 15 MIN: CPT | Performed by: PHARMACIST

## 2022-01-01 PROCEDURE — 99605 MTMS BY PHARM NP 15 MIN: CPT | Performed by: PHARMACIST

## 2022-01-01 PROCEDURE — 99203 OFFICE O/P NEW LOW 30 MIN: CPT | Performed by: INTERNAL MEDICINE

## 2022-01-01 RX ORDER — DIPHENHYDRAMINE HCL 25 MG
50 CAPSULE ORAL ONCE
Status: COMPLETED | OUTPATIENT
Start: 2022-01-01 | End: 2022-01-01

## 2022-01-01 RX ORDER — AMOXICILLIN 500 MG/1
2000 CAPSULE ORAL PRN
COMMUNITY
End: 2023-01-01

## 2022-01-01 RX ORDER — DEXAMETHASONE SODIUM PHOSPHATE 10 MG/ML
5 INJECTION INTRAMUSCULAR; INTRAVENOUS ONCE
Status: COMPLETED | OUTPATIENT
Start: 2022-01-01 | End: 2022-01-01

## 2022-01-01 RX ORDER — PREDNISONE 20 MG/1
40 TABLET ORAL DAILY
Qty: 4 TABLET | Refills: 0 | Status: SHIPPED | OUTPATIENT
Start: 2022-01-01 | End: 2022-01-01

## 2022-01-01 RX ORDER — DEXAMETHASONE SODIUM PHOSPHATE 4 MG/ML
5 VIAL (ML) INJECTION ONCE
Status: DISCONTINUED | OUTPATIENT
Start: 2022-01-01 | End: 2022-01-01 | Stop reason: DRUGHIGH

## 2022-01-01 RX ORDER — BISACODYL 10 MG
10 SUPPOSITORY, RECTAL RECTAL DAILY PRN
COMMUNITY
End: 2023-01-01

## 2022-01-01 RX ADMIN — DEXAMETHASONE SODIUM PHOSPHATE 5 MG: 10 INJECTION INTRAMUSCULAR; INTRAVENOUS at 18:16

## 2022-01-01 RX ADMIN — Medication 50 MG: at 15:28

## 2022-07-17 NOTE — PROGRESS NOTES
Medication Therapy Management (MTM) Encounter    ASSESSMENT:                            Medication Adherence/Access: No issues identified    Nerve Pain/Foot Drop: Stable - patient and I are in agreement about continuing Norco and gabapentin at the current doses. We will continue monitoring his pain control and reevaluate at follow-up.    Constipation: Patient has not tried Miralax for adequate duration to determine effectiveness. Counseled Miralax is for constipation prevention not treatment.    Hypertension: Stable. Patient is meeting blood pressure goal of < 130/80mmHg.    Supplements: Stable.    Dental Prophy: Deferring to dentist.    Anemia: Patient plans to see PCP soon, can recheck anemia labs at that time.    Rash: Stable.    PLAN:                            1. Retry Miralax 17 grams every day, needs at least 2 week trial of consistent use to be effective for constipation maintenance.  2. Plan on seeing Dr. Corey in 2-4 weeks to follow-up on constipation and anemia labs.    Follow-up: Return in about 1 month (around 8/19/2022) for Primary Care Provider Visit.    SUBJECTIVE/OBJECTIVE:                          Matthew Branch is a 89 year old male called for a follow-up visit.  Today's visit is a follow-up MTM visit from 12/21/21. This serves as an initial visit for 2022.     Reason for visit: Comprehensive medication review.    Allergies/ADRs: Reviewed in chart  Past Medical History: Reviewed in chart  Tobacco: He reports that he has never smoked. He has never used smokeless tobacco.  Alcohol: not currently using    Medication Adherence/Access: no issues reported    Nerve Pain/Foot Drop: Currently taking hydrocodone/acetaminophen 5/325mg half-tablet 6 times daily, gabapentin 600mg every morning/300mg every afternoon/600mg at bedtime, acetaminophen 1000mg 1-2 times daily, duloxetine 60mg every morning and 30mg at bedtime. [facility] has been working on weaning down as able over the last year. At highest point was on  Norco 6 tabs/day and gabapentin 3000 mg/day. Patient feels starting on duloxetine has been helpful in allowing him to decrease Norco as much as he has. At last PCP visit, increased Norco a bit and spreading out throughout the day, which he reports is working well and feels more comfortable now. Patient is experiencing side effects of constipation - see below.  Last CMP on 1/13/22: sCr 1.19 mg/dL and eGFR 54 mL/min/m2.  Estimated CrCl = 38.2 mL/min (based on ideal body weight).     Constipation: Currently taking bisacodyl suppositories as needed (about every 3rd day). Feels having a BM about every other day is satisfactory. Reports he tried Miralax for 6 days in a row but it didn't help at all. History of bowel obstruction last summer.    Hypertension: Current medications include benazepril 40mg and nifedipine ER 30mg daily.  Patient does self-monitor blood pressure. Home BP monitoring in range of 110-130's systolic over 70-80's diastolic. Last clinic BP was elevated - 144/98 mmHg on 3/31/22. I had patient check home BP during our visit today - 129/79 mmHg. Patient is not experiencing side effects.    Supplements: Currently taking vitamin D 1000 units daily and Focus Select AREDS2 1 tablet daily. Recently added in eye vitamin as directed by his eye doctor. No concerns today. No lab results on file.     Dental Prophy: Patient takes amoxicillin 2000mg prior to dental work. History of hip replacement. He knows this is abx may no longer be necessary but prefers to continue it. No concerns today.     Anemia: Currently taking iron glycinate 28mg daily, started last January per PCP. Denies any changes in constipation. Last hgb 12.3 g/dL on 1/13/22. No iron labs on file.    Rash: Patient intermittently uses triamcinolone 0.1% cream as needed for scalp. No concerns today.    Today's Vitals: There were no vitals taken for this visit.     ----------------    I spent 30 minutes with this patient today (an extra 15 minutes was  spent creating the Medication Action Plan). All changes were made via collaborative practice agreement with Elliott Corey MD. A copy of the visit note was provided to the patient's provider(s).    The patient declined a summary of these recommendations.     Preethi Lawson, PharmD, [facility]  Medication Therapy Management Pharmacist  Pager: [phone]    Telemedicine Visit Details  Type of service:  Telephone visit  Start Time: 0930  End Time: 1000  Originating Location (patient location): [place]  Distant Location (provider location):  [facility] MTM     Medication Therapy Recommendations  Drug-induced constipation    Current Medication: bisacodyl (DULCOLAX) 10 MG suppository   Rationale: Synergistic therapy - Needs additional medication therapy - Indication   Recommendation: Start Medication - MIRALAX PO - Take 17g by mouth daily.   Status: Accepted - no CPA Needed

## 2022-07-19 NOTE — LETTER
July 19, 2022  Matthew TANNA Sarina  [address] [address]    Dear Mr. Branch, [facility]        Thank you for talking with me on Jul 19, 2022 about your health and medications. As a follow-up to our conversation, I have included two documents:      1. Your Recommended To-Do List has steps you should take to get the best results from your medications.  2. Your Medication List will help you keep track of your medications and how to take them.    If you want to talk about these documents, please call Preethi Lawson RPH at phone: [phone], Monday-Friday 8-4:30pm.    I look forward to working with you and your doctors to make sure your medications work well for you.    Sincerely,    Preethi Lawson RPH  [facility] Pharmacist, [facility]

## 2022-07-19 NOTE — Clinical Note
William Boston - Here's the last one we saw together. Thanks!  Preethi Lawson, PharmD, BCACP Medication Therapy Management Pharmacist Pager: [phone]

## 2022-07-19 NOTE — LETTER
"Recommended To-Do List      Prepared on: 7/19/2022     You can get the best results from your medications by completing the items on this \"To-Do List.\"      Bring your To-Do List when you go to your doctor. And, share it with your family or caregivers.    My To-Do List:  What we talked about: What I should do:   Constipation prevention    Retry Miralax 17 grams every day, needs at least 2 week trial of consistent use to be effective for constipation maintenance. Plan on seeing Dr. Corey in 2-4 weeks to follow-up on constipation and anemia labs.          What we talked about: What I should do:                       "

## 2022-07-19 NOTE — LETTER
_  Medication List        Prepared on: 7/19/2022     Bring your Medication List when you go to the doctor, hospital, or   emergency room. And, share it with your family or caregivers.     Note any changes to how you take your medications.  Cross out medications when you no longer use them.    Medication How I take it Why I use it Prescriber   acetaminophen (TYLENOL) 500 MG tablet Take 2 tablets (1,000 mg) by mouth 2 times daily as needed for mild pain Pain Elliott Corey MD   amoxicillin (AMOXIL) 500 MG capsule Take 4 capsules (2,000 mg) by mouth as needed (1 hour prior to dental work) Preventative antibiotic Elliott Corey MD   benazepril (LOTENSIN) 40 MG tablet Take 1 tablet (40 mg) by mouth daily  Blood pressure Elliott Corey MD   bisacodyl (DULCOLAX) 10 MG suppository Place 1 suppository (10 mg) rectally daily as needed for constipation Constipation Elliott Corey MD   cholecalciferol (VITAMIN D) 1000 UNIT tablet Take 1 tablet (1000 units) by mouth daily. General health Elliott Corey MD   DULoxetine (CYMBALTA) 30 MG capsule Take 1 capsule (30 mg) by mouth At Bedtime Nerve pain Elliott Corey MD   DULoxetine HCl 60 MG CSDR Take 1 capsule (60 mg) by mouth every morning  Nerve pain Elliott Corey MD   Ferrous Sulfate (IRON) 28 MG TABS Take 1 tablet (28 mg) by mouth daily Anemia Elliott Corey MD   gabapentin (NEURONTIN) 600 MG tablet Take 1 tablet (600 mg) by mouth every morning and one-half tablet (300 mg) every afternoon and 1 tablet (600 mg) at bedtime. Nerve pain Elliott Corey MD   HYDROcodone-acetaminophen (NORCO) 5-325 MG tablet Take one-half tablet by mouth 6 times daily Chronic pain Elliott Corey MD   Multiple Vitamins-Minerals (EYE VITAMINS PO) Take 1 capsule by mouth daily (Focus Select AREDS2) Eye health Self   NIFEdipine ER OSMOTIC (PROCARDIA XL) 30 MG 24 hr tablet Take 1 tablet (30 mg) by mouth daily. Blood pressure Elliott Corey MD   triamcinolone (KENALOG) 0.1 % external cream Apply topically daily as needed (scalp lesions)  Nagi Corey MD         Add new medications, over-the-counter drugs, herbals, vitamins, or  minerals in the blank rows below.    Medication How I take it Why I use it Prescriber                          Allergies:      amitriptyline hcl; bees; lamisil        Side effects I have had:               Other Information:              My notes and questions:

## 2022-07-31 NOTE — PROGRESS NOTES
"  Assessment & Plan     Urticaria  Systemic urticarial response to presumed bee sting.  No prior history of anaphylactic response.  Prior history of similar systemic urticarial response.  No current evidence of airway involvement of diffuse vasodilation.  Low risk of anaphylaxis and acceptable to manage without epinephrine.  Will manage with antihistamine and systemic corticosteroid.    - diphenhydrAMINE (BENADRYL) capsule 50 mg  - predniSONE (DELTASONE) 20 MG tablet; Take 2 tablets (40 mg) by mouth daily for 2 days  - dexamethasone (DECADRON) injectable solution used ORALLY 5 mg    Bee sting allergy  - diphenhydrAMINE (BENADRYL) capsule 50 mg  - predniSONE (DELTASONE) 20 MG tablet; Take 2 tablets (40 mg) by mouth daily for 2 days  - dexamethasone (DECADRON) injectable solution used ORALLY 5 mg    Jignesh Sosa MD  [facility] [place]    Ioana Castro is a 89 year old, presenting for the following health issues:  Urgent Care and Insect Bite (Stung by bee on forehead, rash also on left arm and right waistline. Is allergic to bees. )      HPI   Patient has a history of bee sting allergy and this manifested by diffuse hives.  This occurred about 30 years ago.  He now reports a sting of some kind on the forehead.  He didn't see the insect.  Now he has developed hives on the forehead and also on the left arm and the right hip.  He took a dose of antihistamine.  Has never had a reaction with breathing problems or other airway problems.      Review of Systems   Constitutional, HEENT, cardiovascular, pulmonary, gi and gu systems are negative, except as otherwise noted.      Objective    /68   Pulse 78   Temp 97.9  F (36.6  C) (Temporal)   Resp 15   Ht 1.734 m (5' 8.25\")   Wt 63.5 kg (140 lb)   SpO2 98%   BMI 21.13 kg/m    Body mass index is 21.13 kg/m .  Physical Exam   GENERAL APPEARANCE: alert and no distress  HENT: OP is clear;   NECK: no adenopathy and no asymmetry, " masses, or scars  RESP: lungs clear to auscultation - no rales, rhonchi or wheezes  CV: regular rates and rhythm, normal S1 S2, no S3 or S4 and no murmur, click or rub              .  ..

## 2023-01-01 ENCOUNTER — LAB REQUISITION (OUTPATIENT)
Dept: LAB | Facility: CLINIC | Age: 88
End: 2023-01-01
Payer: COMMERCIAL

## 2023-01-01 ENCOUNTER — TRANSITIONAL CARE UNIT VISIT (OUTPATIENT)
Dept: GERIATRICS | Facility: CLINIC | Age: 88
End: 2023-01-01
Payer: COMMERCIAL

## 2023-01-01 ENCOUNTER — VIRTUAL VISIT (OUTPATIENT)
Dept: PHARMACY | Facility: PHYSICIAN GROUP | Age: 88
End: 2023-01-01
Payer: COMMERCIAL

## 2023-01-01 ENCOUNTER — TELEPHONE (OUTPATIENT)
Dept: GERIATRICS | Facility: CLINIC | Age: 88
End: 2023-01-01
Payer: COMMERCIAL

## 2023-01-01 ENCOUNTER — TELEPHONE (OUTPATIENT)
Dept: GERIATRICS | Facility: CLINIC | Age: 88
End: 2023-01-01

## 2023-01-01 ENCOUNTER — TELEPHONE (OUTPATIENT)
Dept: PHARMACY | Facility: CLINIC | Age: 88
End: 2023-01-01
Payer: COMMERCIAL

## 2023-01-01 ENCOUNTER — TELEPHONE (OUTPATIENT)
Dept: PHARMACY | Facility: PHYSICIAN GROUP | Age: 88
End: 2023-01-01
Payer: COMMERCIAL

## 2023-01-01 VITALS
SYSTOLIC BLOOD PRESSURE: 127 MMHG | DIASTOLIC BLOOD PRESSURE: 76 MMHG | RESPIRATION RATE: 18 BRPM | WEIGHT: 137.8 LBS | TEMPERATURE: 97.7 F | BODY MASS INDEX: 20.88 KG/M2 | HEIGHT: 68 IN | HEART RATE: 66 BPM | OXYGEN SATURATION: 99 %

## 2023-01-01 VITALS
HEIGHT: 68 IN | DIASTOLIC BLOOD PRESSURE: 74 MMHG | RESPIRATION RATE: 18 BRPM | HEART RATE: 98 BPM | TEMPERATURE: 97.8 F | WEIGHT: 137 LBS | BODY MASS INDEX: 20.76 KG/M2 | SYSTOLIC BLOOD PRESSURE: 121 MMHG | OXYGEN SATURATION: 96 %

## 2023-01-01 VITALS
BODY MASS INDEX: 22.13 KG/M2 | TEMPERATURE: 97.8 F | OXYGEN SATURATION: 98 % | DIASTOLIC BLOOD PRESSURE: 77 MMHG | RESPIRATION RATE: 17 BRPM | WEIGHT: 146 LBS | HEIGHT: 68 IN | HEART RATE: 91 BPM | SYSTOLIC BLOOD PRESSURE: 130 MMHG

## 2023-01-01 VITALS
DIASTOLIC BLOOD PRESSURE: 72 MMHG | BODY MASS INDEX: 20.76 KG/M2 | RESPIRATION RATE: 18 BRPM | WEIGHT: 137 LBS | SYSTOLIC BLOOD PRESSURE: 128 MMHG | TEMPERATURE: 97.1 F | HEIGHT: 68 IN | OXYGEN SATURATION: 96 % | HEART RATE: 68 BPM

## 2023-01-01 VITALS
HEART RATE: 68 BPM | TEMPERATURE: 96.9 F | WEIGHT: 137.8 LBS | HEIGHT: 68 IN | OXYGEN SATURATION: 96 % | BODY MASS INDEX: 20.88 KG/M2 | RESPIRATION RATE: 18 BRPM | SYSTOLIC BLOOD PRESSURE: 135 MMHG | DIASTOLIC BLOOD PRESSURE: 80 MMHG

## 2023-01-01 VITALS
HEART RATE: 76 BPM | RESPIRATION RATE: 18 BRPM | SYSTOLIC BLOOD PRESSURE: 119 MMHG | WEIGHT: 137.8 LBS | HEIGHT: 68 IN | BODY MASS INDEX: 20.88 KG/M2 | OXYGEN SATURATION: 94 % | TEMPERATURE: 97.1 F | DIASTOLIC BLOOD PRESSURE: 69 MMHG

## 2023-01-01 VITALS
BODY MASS INDEX: 19.79 KG/M2 | HEART RATE: 83 BPM | OXYGEN SATURATION: 95 % | SYSTOLIC BLOOD PRESSURE: 129 MMHG | RESPIRATION RATE: 18 BRPM | TEMPERATURE: 97.8 F | HEIGHT: 68 IN | DIASTOLIC BLOOD PRESSURE: 90 MMHG | WEIGHT: 130.6 LBS

## 2023-01-01 DIAGNOSIS — S12.101D CLOSED NONDISPLACED FRACTURE OF SECOND CERVICAL VERTEBRA WITH ROUTINE HEALING, UNSPECIFIED FRACTURE MORPHOLOGY, SUBSEQUENT ENCOUNTER: Primary | ICD-10-CM

## 2023-01-01 DIAGNOSIS — R52 PAIN MANAGEMENT: ICD-10-CM

## 2023-01-01 DIAGNOSIS — D63.8 ANEMIA IN OTHER CHRONIC DISEASES CLASSIFIED ELSEWHERE: ICD-10-CM

## 2023-01-01 DIAGNOSIS — M97.8XXD PERIPROSTHETIC FRACTURE OF FEMUR AT TIP OF PROSTHESIS, SUBSEQUENT ENCOUNTER: ICD-10-CM

## 2023-01-01 DIAGNOSIS — E87.1 HYPONATREMIA: ICD-10-CM

## 2023-01-01 DIAGNOSIS — K59.03 DRUG-INDUCED CONSTIPATION: ICD-10-CM

## 2023-01-01 DIAGNOSIS — K59.00 CONSTIPATION, UNSPECIFIED CONSTIPATION TYPE: ICD-10-CM

## 2023-01-01 DIAGNOSIS — R53.81 PHYSICAL DECONDITIONING: ICD-10-CM

## 2023-01-01 DIAGNOSIS — S52.501D CLOSED FRACTURE OF DISTAL END OF RIGHT RADIUS WITH ROUTINE HEALING, UNSPECIFIED FRACTURE MORPHOLOGY, SUBSEQUENT ENCOUNTER: ICD-10-CM

## 2023-01-01 DIAGNOSIS — S72.141D DISPLACED INTERTROCHANTERIC FRACTURE OF RIGHT FEMUR, SUBSEQUENT ENCOUNTER FOR CLOSED FRACTURE WITH ROUTINE HEALING: ICD-10-CM

## 2023-01-01 DIAGNOSIS — R41.0 DELIRIUM: ICD-10-CM

## 2023-01-01 DIAGNOSIS — I10 PRIMARY HYPERTENSION: ICD-10-CM

## 2023-01-01 DIAGNOSIS — J34.89 NASAL DRAINAGE: ICD-10-CM

## 2023-01-01 DIAGNOSIS — Z96.649 PERIPROSTHETIC FRACTURE OF FEMUR AT TIP OF PROSTHESIS, SUBSEQUENT ENCOUNTER: ICD-10-CM
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
## 2023-01-01 DIAGNOSIS — M97.01XD PERIPROSTHETIC FRACTURE AROUND INTERNAL PROSTHETIC RIGHT HIP JOINT, SUBSEQUENT ENCOUNTER: ICD-10-CM

## 2023-01-01 DIAGNOSIS — S73.004S DISLOCATION OF RIGHT HIP, SEQUELA: Primary | ICD-10-CM

## 2023-01-01 DIAGNOSIS — D62 ANEMIA DUE TO BLOOD LOSS, ACUTE: ICD-10-CM

## 2023-01-01 DIAGNOSIS — I50.22 CHRONIC HFREF (HEART FAILURE WITH REDUCED EJECTION FRACTION) (H): ICD-10-CM

## 2023-01-01 DIAGNOSIS — G47.00 INSOMNIA, UNSPECIFIED TYPE: ICD-10-CM

## 2023-01-01 DIAGNOSIS — E87.1 HYPONATREMIA: Primary | ICD-10-CM

## 2023-01-01 DIAGNOSIS — Z47.89 AFTERCARE FOLLOWING SURGERY OF THE MUSCULOSKELETAL SYSTEM: Primary | ICD-10-CM

## 2023-01-01 DIAGNOSIS — S12.100D UNSPECIFIED DISPLACED FRACTURE OF SECOND CERVICAL VERTEBRA, SUBSEQUENT ENCOUNTER FOR FRACTURE WITH ROUTINE HEALING: ICD-10-CM

## 2023-01-01 DIAGNOSIS — S12.101D CLOSED NONDISPLACED FRACTURE OF SECOND CERVICAL VERTEBRA WITH ROUTINE HEALING, UNSPECIFIED FRACTURE MORPHOLOGY, SUBSEQUENT ENCOUNTER: ICD-10-CM

## 2023-01-01 DIAGNOSIS — R71.8 OTHER ABNORMALITY OF RED BLOOD CELLS: ICD-10-CM

## 2023-01-01 DIAGNOSIS — R53.81 PHYSICAL DECONDITIONING: Primary | ICD-10-CM

## 2023-01-01 DIAGNOSIS — M48.061 LUMBAR STENOSIS: Primary | ICD-10-CM

## 2023-01-01 DIAGNOSIS — S12.031D CLOSED NONDISPLACED FRACTURE OF POSTERIOR ARCH OF FIRST CERVICAL VERTEBRA WITH ROUTINE HEALING, SUBSEQUENT ENCOUNTER: ICD-10-CM

## 2023-01-01 DIAGNOSIS — Z78.9 TAKES DIETARY SUPPLEMENTS: ICD-10-CM

## 2023-01-01 DIAGNOSIS — I47.19 MULTIFOCAL ATRIAL TACHYCARDIA (H): ICD-10-CM

## 2023-01-01 DIAGNOSIS — S12.100D CLOSED ODONTOID FRACTURE WITH ROUTINE HEALING, SUBSEQUENT ENCOUNTER: ICD-10-CM

## 2023-01-01 DIAGNOSIS — T07.XXXA MULTIPLE FRACTURES: ICD-10-CM

## 2023-01-01 DIAGNOSIS — K59.01 SLOW TRANSIT CONSTIPATION: ICD-10-CM

## 2023-01-01 DIAGNOSIS — G62.9 NEUROPATHY: ICD-10-CM

## 2023-01-01 DIAGNOSIS — R44.3 HALLUCINATIONS, UNSPECIFIED: ICD-10-CM

## 2023-01-01 DIAGNOSIS — E78.5 HYPERLIPIDEMIA, UNSPECIFIED HYPERLIPIDEMIA TYPE: ICD-10-CM

## 2023-01-01 DIAGNOSIS — I48.91 ATRIAL FIBRILLATION, UNSPECIFIED TYPE (H): ICD-10-CM

## 2023-01-01 DIAGNOSIS — D64.9 ANEMIA, UNSPECIFIED: ICD-10-CM

## 2023-01-01 DIAGNOSIS — M79.2 NEUROPATHIC PAIN: ICD-10-CM

## 2023-01-01 LAB
ANION GAP SERPL CALCULATED.3IONS-SCNC: 11 MMOL/L (ref 7–15)
ANION GAP SERPL CALCULATED.3IONS-SCNC: 12 MMOL/L (ref 7–15)
ANION GAP SERPL CALCULATED.3IONS-SCNC: 20 MMOL/L (ref 7–15)
BUN SERPL-MCNC: 14.9 MG/DL (ref 8–23)
BUN SERPL-MCNC: 15.7 MG/DL (ref 8–23)
BUN SERPL-MCNC: 17.5 MG/DL (ref 8–23)
CALCIUM SERPL-MCNC: 8.3 MG/DL (ref 8.8–10.2)
CALCIUM SERPL-MCNC: 8.7 MG/DL (ref 8.8–10.2)
CALCIUM SERPL-MCNC: 9.2 MG/DL (ref 8.8–10.2)
CHLORIDE SERPL-SCNC: 91 MMOL/L (ref 98–107)
CHLORIDE SERPL-SCNC: 92 MMOL/L (ref 98–107)
CHLORIDE SERPL-SCNC: 93 MMOL/L (ref 98–107)
CREAT SERPL-MCNC: 0.78 MG/DL (ref 0.67–1.17)
CREAT SERPL-MCNC: 0.89 MG/DL (ref 0.67–1.17)
CREAT SERPL-MCNC: 0.89 MG/DL (ref 0.67–1.17)
DEPRECATED HCO3 PLAS-SCNC: 20 MMOL/L (ref 22–29)
DEPRECATED HCO3 PLAS-SCNC: 27 MMOL/L (ref 22–29)
DEPRECATED HCO3 PLAS-SCNC: 30 MMOL/L (ref 22–29)
FLUAV AG SPEC QL IA: NEGATIVE
FLUBV AG SPEC QL IA: NEGATIVE
GFR SERPL CREATININE-BSD FRML MDRD: 82 ML/MIN/1.73M2
GFR SERPL CREATININE-BSD FRML MDRD: 82 ML/MIN/1.73M2
GFR SERPL CREATININE-BSD FRML MDRD: 85 ML/MIN/1.73M2
GLUCOSE SERPL-MCNC: 89 MG/DL (ref 70–99)
GLUCOSE SERPL-MCNC: 90 MG/DL (ref 70–99)
GLUCOSE SERPL-MCNC: 96 MG/DL (ref 70–99)
HGB BLD-MCNC: 10.5 G/DL (ref 13.3–17.7)
POTASSIUM SERPL-SCNC: 3.4 MMOL/L (ref 3.4–5.3)
POTASSIUM SERPL-SCNC: 3.7 MMOL/L (ref 3.4–5.3)
POTASSIUM SERPL-SCNC: 4.4 MMOL/L (ref 3.4–5.3)
SARS-COV-2 RNA RESP QL NAA+PROBE: NEGATIVE
SODIUM SERPL-SCNC: 131 MMOL/L (ref 136–145)
SODIUM SERPL-SCNC: 131 MMOL/L (ref 136–145)
SODIUM SERPL-SCNC: 134 MMOL/L (ref 136–145)

## 2023-01-01 PROCEDURE — 99606 MTMS BY PHARM EST 15 MIN: CPT | Performed by: PHARMACIST

## 2023-01-01 PROCEDURE — 87804 INFLUENZA ASSAY W/OPTIC: CPT | Mod: ORL | Performed by: INTERNAL MEDICINE

## 2023-01-01 PROCEDURE — 99309 SBSQ NF CARE MODERATE MDM 30: CPT | Performed by: NURSE PRACTITIONER

## 2023-01-01 PROCEDURE — 99309 SBSQ NF CARE MODERATE MDM 30: CPT | Performed by: INTERNAL MEDICINE

## 2023-01-01 PROCEDURE — 99310 SBSQ NF CARE HIGH MDM 45: CPT | Performed by: INTERNAL MEDICINE

## 2023-01-01 PROCEDURE — 80048 BASIC METABOLIC PNL TOTAL CA: CPT | Mod: ORL | Performed by: INTERNAL MEDICINE

## 2023-01-01 PROCEDURE — P9604 ONE-WAY ALLOW PRORATED TRIP: HCPCS | Mod: ORL | Performed by: INTERNAL MEDICINE

## 2023-01-01 PROCEDURE — 36415 COLL VENOUS BLD VENIPUNCTURE: CPT | Mod: ORL | Performed by: INTERNAL MEDICINE

## 2023-01-01 PROCEDURE — 99607 MTMS BY PHARM ADDL 15 MIN: CPT | Performed by: PHARMACIST

## 2023-01-01 PROCEDURE — U0003 INFECTIOUS AGENT DETECTION BY NUCLEIC ACID (DNA OR RNA); SEVERE ACUTE RESPIRATORY SYNDROME CORONAVIRUS 2 (SARS-COV-2) (CORONAVIRUS DISEASE [COVID-19]), AMPLIFIED PROBE TECHNIQUE, MAKING USE OF HIGH THROUGHPUT TECHNOLOGIES AS DESCRIBED BY CMS-2020-01-R: HCPCS | Mod: ORL | Performed by: INTERNAL MEDICINE

## 2023-01-01 PROCEDURE — 85018 HEMOGLOBIN: CPT | Mod: ORL | Performed by: INTERNAL MEDICINE

## 2023-01-01 PROCEDURE — 99605 MTMS BY PHARM NP 15 MIN: CPT | Performed by: PHARMACIST

## 2023-01-01 PROCEDURE — 99316 NF DSCHRG MGMT 30 MIN+: CPT | Performed by: NURSE PRACTITIONER

## 2023-01-01 PROCEDURE — 99305 1ST NF CARE MODERATE MDM 35: CPT | Performed by: INTERNAL MEDICINE

## 2023-01-01 RX ORDER — OXYCODONE HYDROCHLORIDE 5 MG/1
5 TABLET ORAL EVERY 4 HOURS PRN
COMMUNITY
End: 2023-01-01

## 2023-01-01 RX ORDER — ASPIRIN 81 MG/1
81 TABLET, CHEWABLE ORAL DAILY
COMMUNITY

## 2023-01-01 RX ORDER — AMOXICILLIN 250 MG
2 CAPSULE ORAL 2 TIMES DAILY
COMMUNITY

## 2023-01-01 RX ORDER — AMLODIPINE BESYLATE 5 MG/1
5 TABLET ORAL DAILY
COMMUNITY
End: 2023-01-01

## 2023-01-01 RX ORDER — FLUTICASONE PROPIONATE 50 MCG
1 SPRAY, SUSPENSION (ML) NASAL DAILY
COMMUNITY
Start: 2023-01-01

## 2023-01-01 RX ORDER — TRAZODONE HYDROCHLORIDE 50 MG/1
1 TABLET, FILM COATED ORAL DAILY
COMMUNITY
Start: 2023-01-01

## 2023-01-01 RX ORDER — GABAPENTIN 100 MG/1
300 CAPSULE ORAL AT BEDTIME
COMMUNITY
End: 2023-01-01 | Stop reason: DRUGHIGH

## 2023-01-01 RX ORDER — LANOLIN ALCOHOL/MO/W.PET/CERES
3 CREAM (GRAM) TOPICAL
COMMUNITY

## 2023-01-01 RX ORDER — HYDROCODONE BITARTRATE AND ACETAMINOPHEN 5; 325 MG/1; MG/1
.5-1 TABLET ORAL EVERY 6 HOURS PRN
Qty: 30 TABLET | Refills: 0 | Status: SHIPPED | OUTPATIENT
Start: 2023-01-01 | End: 2023-01-01

## 2023-01-01 RX ORDER — ACETAMINOPHEN 325 MG/1
650 TABLET ORAL EVERY 6 HOURS
COMMUNITY

## 2023-01-01 RX ORDER — METOPROLOL SUCCINATE 25 MG/1
25 TABLET, EXTENDED RELEASE ORAL DAILY
COMMUNITY
End: 2023-01-01

## 2023-01-01 RX ORDER — FUROSEMIDE 20 MG
1 TABLET ORAL DAILY
COMMUNITY
Start: 2023-01-01

## 2023-01-01 RX ORDER — METOPROLOL TARTRATE 25 MG/1
1 TABLET, FILM COATED ORAL 2 TIMES DAILY
COMMUNITY
Start: 2023-01-01

## 2023-01-01 RX ORDER — HYDROCODONE BITARTRATE AND ACETAMINOPHEN 5; 325 MG/1; MG/1
TABLET ORAL
Qty: 60 TABLET | Refills: 0 | Status: SHIPPED | OUTPATIENT
Start: 2023-01-01 | End: 2023-01-01 | Stop reason: DRUGHIGH

## 2023-01-01 RX ORDER — GABAPENTIN 300 MG/1
300 CAPSULE ORAL AT BEDTIME
COMMUNITY

## 2023-01-01 RX ORDER — APIXABAN 2.5 MG/1
1 TABLET, FILM COATED ORAL 2 TIMES DAILY
COMMUNITY
Start: 2023-01-01

## 2023-01-01 RX ORDER — POLYETHYLENE GLYCOL 3350 17 G/17G
1 POWDER, FOR SOLUTION ORAL DAILY
COMMUNITY

## 2023-01-01 RX ORDER — LIDOCAINE 4 G/G
1 PATCH TOPICAL EVERY 24 HOURS
COMMUNITY

## 2023-01-01 RX ORDER — POTASSIUM CHLORIDE 1500 MG/1
1 TABLET, EXTENDED RELEASE ORAL DAILY
COMMUNITY
Start: 2023-01-01

## 2023-01-01 RX ORDER — ATORVASTATIN CALCIUM 40 MG/1
40 TABLET, FILM COATED ORAL DAILY
COMMUNITY
End: 2023-01-01

## 2023-01-01 RX ORDER — HYDROCODONE BITARTRATE AND ACETAMINOPHEN 5; 325 MG/1; MG/1
TABLET ORAL
COMMUNITY
Start: 2023-01-01 | End: 2023-01-01

## 2023-01-25 NOTE — LETTER
1/25/2023        RE: Matthew Branch  1957 [place] [id number]        [facility] GERIATRICS  INITIAL VISIT NOTE  January 25, 2023    PRIMARY CARE PROVIDER AND CLINIC:  Elliott Corey [address] / [place] [address]    [facility] Medical Record Number:  [id number]  Place of Service where encounter took place:  [facility] ([facility]) [[id number]]    Chief Complaint   Patient presents with     Hospital F/U       HPI:    Matthew Branch is a 89 year old  (2/25/1933) male seen today at [facility]. 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 Urology recommended observation and follow up in clinic in 6-8 weeks for Lasix renogram.   - Non specific hepatic ductal dilatation - MRCP with severe severe intra and extrahepatic biliary ductal dilatation with tapering at the ampulla, possible filling defect in the distal common bile duct. GI recommended ERCP in about 12 weeks after out of the C-collar.   - right renal cyst - needs MRI as outpatient     TTE with EF 45-50%, generalized LV hypokinesis, mild pulmonary HTN and moderate TR. Developed multifocal atrial tachycardia - new on low dose metoprolol, ASA, amlodipine and statin. PTA benazapril and nifedipine were discontinued.     He was admitted to this facility for  rehab, medical management and nursing care.      History obtained from: facility chart records, facility staff, patient report, [place]  [facility] chart review and Care Everywhere [facility] chart review.      Today, Mr. Branch is seen in his room with his spouse Damari. Both are good historians. Reviewed hospital course and expected rehab course.  Couple concerns today   -- from Damari: his energy runs down during the day and at times seems a little confused in the evenings. This resolves with overnight sleep.   -- from both: It is a bit difficult to gauge, but he is reporting pain I believe in his back right/scapula but also front of his head with certain positions while working with therapy.  The head pain is self-limited, and over his forehead.  No associated vision changes, dizziness or tinnitus.  Talked about whether he has some postconcussive symptoms after hitting his head with this fall.  He does not report any sensitivity to sound or light.  I was able to talk with PT in the room, and he is progressing and they are not noticing any limitations with this due to pain.  He has reported some right back/shoulder pain to them. Therapy is monitoring and will update if any concerns from their lens.     No chest pain or dyspnea.  No belly pain.  No diarrhea or constipation.  Reviewed hospital course including new medications, echocardiogram, liver imaging and urologic imaging.  They understand the findings and the recommended outpatient follow-up.    No concerns today per discussion with nursing.  He is working with therapies.    CODE STATUS: CPR/Full code     ALLERGIES:  Allergies   Allergen Reactions     Amitriptyline Hcl Other (See Comments)     Dry mouth     Bees Swelling     Local swelling to Hornet stings     Lamisil Rash       PAST MEDICAL HISTORY:   Past Medical History:   Diagnosis Date     Arthritis     back, (L) hip, (L) knee     Dermatitis scalp     Foraminal stenosis of lumbar region      Hypertension      Left foot drop      Left renal mass     with resulting hydronephrosis     Neuropathic pain, leg     left     Numbness and tingling     drop foot (L)  side with numbness     Prostate cancer (H)      Prostate cancer (H)      Thalassemia minor      Vitamin D deficiency      Vitamin D deficiency disease        PAST SURGICAL HISTORY:   Past Surgical History:   Procedure Laterality Date     ARTHROPLASTY HIP  1/25/2012    Procedure:ARTHROPLASTY HIP; LEFT TOTAL HIP ARTHROPLASTY (BIOMET); Surgeon:ROBERT JAMES; Location:[facility] OR     ARTHROPLASTY HIP ANTERIOR Right 8/20/2019    Procedure: DIRECT ANTERIOR RIGHT TOTAL HIP ARTHROPLASTY;  Surgeon: Anatoly Marin MD;  Location: [facility] OR     BACK SURGERY      2 back surgerys in 2011     CHOLECYSTECTOMY       GI SURGERY      prostatectomy     LAMINECTOMY LUMBAR POSTERIOR MICROSCOPIC ONE LEVEL  12/19/2012    Procedure: LAMINECTOMY LUMBAR POSTERIOR MICROSCOPIC ONE LEVEL;  Lumbar 2-3 Laminectomy Decompression;  Surgeon: Keron Guido MD;  Location: [facility] OR     LAPAROSCOPIC PROSTATECTOMY       SOCIAL HISTORY:   Patient's living condition: lives with spouse    MEDICATIONS:  Post Discharge Medication Reconciliation Status: discharge medications reconciled and changed, per note/orders.  Current Outpatient Medications   Medication Sig Dispense Refill     acetaminophen (TYLENOL) 500 MG tablet Take 1,000 mg by mouth 4 times daily       amLODIPine (NORVASC) 5 MG tablet Take 5 mg by mouth daily       aspirin (ASA) 81 MG chewable tablet Take 81 mg by mouth daily       atorvastatin (LIPITOR) 40 MG tablet Take 40 mg by mouth daily       bisacodyl (DULCOLAX) 10 MG suppository Place 10 mg rectally daily as needed for constipation       cholecalciferol (VITAMIN D) 1000 UNIT tablet Take 1 tablet by mouth daily. 100 tablet      DULoxetine (CYMBALTA) 30 MG capsule Take 30 mg by mouth At Bedtime       DULoxetine HCl 60 MG CSDR Take 60 mg by mouth every morning        Ferrous Sulfate (IRON) 28 MG TABS Take 1 tablet by mouth daily       gabapentin (NEURONTIN) 300 MG capsule Take by mouth 3 times daily 600 mg morning and at bedtime and  "300 mg at 1400       metoprolol succinate ER (TOPROL XL) 25 MG 24 hr tablet Take 25 mg by mouth daily       oxyCODONE (ROXICODONE) 5 MG tablet Take 5 mg by mouth every 4 hours as needed for severe pain (7-10)       polyethylene glycol (MIRALAX) 17 g packet Take 1 packet by mouth daily as needed for constipation       senna-docusate (SENOKOT-S/PERICOLACE) 8.6-50 MG tablet Take 2 tablets by mouth 2 times daily as needed for constipation         ROS:  10 point ROS neg other than the symptoms noted above in the HPI.    PHYSICAL EXAM:  /77   Pulse 91   Temp 97.8  F (36.6  C)   Resp 17   Ht 1.727 m (5' 8\")   Wt 66.2 kg (146 lb)   SpO2 98%   BMI 22.20 kg/m    Gen: sitting up in bed, alert, cooperative and in no acute distress  HEENT: good hearing acuity; moist mucous membranes in mouth; C collar in place and fitting him well   Card: RRR, S1, S2, no murmurs  Resp: lungs clear to auscultation bilaterally, no crackles or wheezes anteriorly or laterally   Ext: decreased muscle tone; no LE edema; short arm cast on RUE  Neuro: CX II-XII grossly in tact; ROM in all four extremities grossly in tact  Psych: alert and oriented to self and general situation; normal affect     LABORATORY/IMAGING DATA:  [facility]    ASSESSMENT/PLAN:    Right Periprosthetic Femur Fracture s/p ORIF (1/17/23)  Secondary to a fall on 1/10/23. Surgery without complication   -- 50% weight bearing to RLE  -- DVT ppx with ASA 81 mg daily per ortho   -- will need to get duration clarified at follow up  -- analgesia with APAP 1000 mg QID and oxycodone 5 mg q4h PRN   -- PT/OT  -- will see if we can do ortho follow up on site for hip and wrist given difficulty with transport out    Acute Blood Loss Anemia  Chronic Anemia  Hgb 12.6 --> 8.2.  No Fe labs I can seen in Epic.   -- Fe supplement daily  -- check Hgb 1/30/23    Right Distal Radius Fracture  Secondary to a fall on 1/10/23. Non-op management  -- NWB to RUE  -- " analgesia with APAP 1000 mg QID and oxycodone 5 mg q4h PRN   -- PT/OT  -- will see if we can do ortho follow up on site for hip and wrist given difficulty with transport out    C1 Posterior Arch Fracture  Odontoid fracture  C2 fracture   Secondary to a fall on 1/10/23. Non-op management   -- C collar   -- analgesia with APAP 1000 mg QID and oxycodone 5 mg q4h PRN   -- PT/OT  -- follow up with ortho spine as scheduled on 2/23/23    T9 fracture (30-40% height loss)  Age indeterminate  -- noted for clinical significance     Multifocal Atrial Tachycardia  HR 70s. New on metoprolol during hospitalization  -- metoprolol XL 25 mg daily   -- follow up with cardiology as scheduled    HFrEF (EF 45-50%), HTN, HLD  SBPs 120s-130s. No weight trend. New on amlodipine, ASA, atorvastatin, metoprolol and PTA benazepril and nifedipine discontinued.   -- amlodipine 5 mg daily, ASA 81 mg daily, atorvastatin 40 mg daily, metoprolol XL 25 mg daily   -- follow BPs, weights, clinical volume status  -- BMP 1/30/23    Right Renal Cyst  Radiology report notes stability of this since 2019 which favors benign lesions. Of note had a 3.5 cm Grade 2 RCC with negative margins removed in February 2020.   -- needs outpatient urology follow up for MRI renal mass    Left Hydronephrosis  Left UPJ Obstruction  Likely chronic obstruction given normal renal function and no symptoms.   -- needs outpatient urology follow up for Lasix renogram (6-8 wks - late Feb/early March)    Hepatic Duct Dilatation  MRCP with severe intra and extrahepatic biliary ductal dilatation  -- needs outpatient GI follow up for ERCP in about 12 weeks (mid April)    Chronic Neuropathic Pain  Left Foot Drop  At home takes Norco - this was discontinued and oxycodone started during hospital sstay  -- duloxetine 60 mg in the morning and 30 mg at bedtime, gabapentin 600 mg BID at morning and bedtime and 300 mg in the afternoon     Slow Transit Constipation  --  Miralax 17g daily PRN and  and Senna-S 2 tab BID PRN  -- adjust bowel regimen as needed    Fall  Physical Deconditioning  In setting of hospitalization and underlying medical conditions  -- ongoing PT/OT      Electronically signed by:  Zeina Gill MD                            Sincerely,        Zeina Gill MD

## 2023-01-25 NOTE — PROGRESS NOTES
[facility] GERIATRICS  INITIAL VISIT NOTE  January 25, 2023    PRIMARY CARE PROVIDER AND CLINIC:  Elliott Corey [address] / [place] [address]    [facility] Medical Record Number:  [id number]  Place of Service where encounter took place:  [facility] ([facility]) [[id number]]    Chief Complaint   Patient presents with     Hospital F/U       HPI:    Matthew Branch is a 89 year old  (2/25/1933) male seen today at [facility]. 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 Urology recommended observation and follow up in clinic in 6-8 weeks for Lasix renogram.   - Non specific hepatic ductal dilatation - MRCP with severe severe intra and extrahepatic biliary ductal dilatation with tapering at the ampulla, possible filling defect in the distal common bile duct. GI recommended ERCP in about 12 weeks after out of the C-collar.   - right renal cyst - needs MRI as outpatient     TTE with EF 45-50%, generalized LV hypokinesis, mild pulmonary HTN and moderate TR. Developed multifocal atrial tachycardia - new on low dose metoprolol, ASA, amlodipine and statin. PTA benazapril and nifedipine were discontinued.     He was admitted to this facility for  rehab, medical management and nursing care.      History obtained from: facility chart records, facility staff, patient report, [place] Epic chart review and Care Everywhere Epic chart review.      Today, Mr. Branch is seen in his  room with his spouse Damari. Both are good historians. Reviewed hospital course and expected rehab course.  Couple concerns today   -- from Damari: his energy runs down during the day and at times seems a little confused in the evenings. This resolves with overnight sleep.   -- from both: It is a bit difficult to gauge, but he is reporting pain I believe in his back right/scapula but also front of his head with certain positions while working with therapy.  The head pain is self-limited, and over his forehead.  No associated vision changes, dizziness or tinnitus.  Talked about whether he has some postconcussive symptoms after hitting his head with this fall.  He does not report any sensitivity to sound or light.  I was able to talk with PT in the room, and he is progressing and they are not noticing any limitations with this due to pain.  He has reported some right back/shoulder pain to them. Therapy is monitoring and will update if any concerns from their lens.     No chest pain or dyspnea.  No belly pain.  No diarrhea or constipation.  Reviewed hospital course including new medications, echocardiogram, liver imaging and urologic imaging.  They understand the findings and the recommended outpatient follow-up.    No concerns today per discussion with nursing.  He is working with therapies.    CODE STATUS: CPR/Full code     ALLERGIES:  Allergies   Allergen Reactions     Amitriptyline Hcl Other (See Comments)     Dry mouth     Bees Swelling     Local swelling to Hornet stings     Lamisil Rash       PAST MEDICAL HISTORY:   Past Medical History:   Diagnosis Date     Arthritis     back, (L) hip, (L) knee     Dermatitis scalp     Foraminal stenosis of lumbar region      Hypertension      Left foot drop      Left renal mass     with resulting hydronephrosis     Neuropathic pain, leg     left     Numbness and tingling     drop foot (L) side with numbness     Prostate cancer (H)      Prostate cancer (H)      Thalassemia minor       Vitamin D deficiency      Vitamin D deficiency disease        PAST SURGICAL HISTORY:   Past Surgical History:   Procedure Laterality Date     ARTHROPLASTY HIP  1/25/2012    Procedure:ARTHROPLASTY HIP; LEFT TOTAL HIP ARTHROPLASTY (BIOMET); Surgeon:ROBERT JAMES; Location:[facility] OR     ARTHROPLASTY HIP ANTERIOR Right 8/20/2019    Procedure: DIRECT ANTERIOR RIGHT TOTAL HIP ARTHROPLASTY;  Surgeon: Anatoly Marin MD;  Location: [facility] OR     BACK SURGERY      2 back surgerys in 2011     CHOLECYSTECTOMY       GI SURGERY      prostatectomy     LAMINECTOMY LUMBAR POSTERIOR MICROSCOPIC ONE LEVEL  12/19/2012    Procedure: LAMINECTOMY LUMBAR POSTERIOR MICROSCOPIC ONE LEVEL;  Lumbar 2-3 Laminectomy Decompression;  Surgeon: Keron Guido MD;  Location: [facility] OR     LAPAROSCOPIC PROSTATECTOMY       SOCIAL HISTORY:   Patient's living condition: lives with spouse    MEDICATIONS:  Post Discharge Medication Reconciliation Status: discharge medications reconciled and changed, per note/orders.  Current Outpatient Medications   Medication Sig Dispense Refill     acetaminophen (TYLENOL) 500 MG tablet Take 1,000 mg by mouth 4 times daily       amLODIPine (NORVASC) 5 MG tablet Take 5 mg by mouth daily       aspirin (ASA) 81 MG chewable tablet Take 81 mg by mouth daily       atorvastatin (LIPITOR) 40 MG tablet Take 40 mg by mouth daily       bisacodyl (DULCOLAX) 10 MG suppository Place 10 mg rectally daily as needed for constipation       cholecalciferol (VITAMIN D) 1000 UNIT tablet Take 1 tablet by mouth daily. 100 tablet      DULoxetine (CYMBALTA) 30 MG capsule Take 30 mg by mouth At Bedtime       DULoxetine HCl 60 MG CSDR Take 60 mg by mouth every morning        Ferrous Sulfate (IRON) 28 MG TABS Take 1 tablet by mouth daily       gabapentin (NEURONTIN) 300 MG capsule Take by mouth 3 times daily 600 mg morning and at bedtime and 300 mg at 1400       metoprolol succinate ER (TOPROL XL) 25 MG 24 hr tablet Take 25 mg by  "mouth daily       oxyCODONE (ROXICODONE) 5 MG tablet Take 5 mg by mouth every 4 hours as needed for severe pain (7-10)       polyethylene glycol (MIRALAX) 17 g packet Take 1 packet by mouth daily as needed for constipation       senna-docusate (SENOKOT-S/PERICOLACE) 8.6-50 MG tablet Take 2 tablets by mouth 2 times daily as needed for constipation         ROS:  10 point ROS neg other than the symptoms noted above in the HPI.    PHYSICAL EXAM:  /77   Pulse 91   Temp 97.8  F (36.6  C)   Resp 17   Ht 1.727 m (5' 8\")   Wt 66.2 kg (146 lb)   SpO2 98%   BMI 22.20 kg/m    Gen: sitting up in bed, alert, cooperative and in no acute distress  HEENT: good hearing acuity; moist mucous membranes in mouth; C collar in place and fitting him well   Card: RRR, S1, S2, no murmurs  Resp: lungs clear to auscultation bilaterally, no crackles or wheezes anteriorly or laterally   Ext: decreased muscle tone; no LE edema; short arm cast on RUE  Neuro: CX II-XII grossly in tact; ROM in all four extremities grossly in tact  Psych: alert and oriented to self and general situation; normal affect     LABORATORY/IMAGING DATA:  Reviewed as per [facility] and/or [facility]    ASSESSMENT/PLAN:    Right Periprosthetic Femur Fracture s/p ORIF (1/17/23)  Secondary to a fall on 1/10/23. Surgery without complication   -- 50% weight bearing to RLE  -- DVT ppx with ASA 81 mg daily per ortho   -- will need to get duration clarified at follow up  -- analgesia with APAP 1000 mg QID and oxycodone 5 mg q4h PRN   -- PT/OT  -- will see if we can do ortho follow up on site for hip and wrist given difficulty with transport out    Acute Blood Loss Anemia  Chronic Anemia  Hgb 12.6 --> 8.2.  No Fe labs I can seen in Epic.   -- Fe supplement daily  -- check Hgb 1/30/23    Right Distal Radius Fracture  Secondary to a fall on 1/10/23. Non-op management  -- NWB to RUE  -- analgesia with APAP 1000 mg QID and oxycodone 5 mg q4h PRN   -- PT/OT  -- will see if we " can do ortho follow up on site for hip and wrist given difficulty with transport out    C1 Posterior Arch Fracture  Odontoid fracture  C2 fracture   Secondary to a fall on 1/10/23. Non-op management   -- C collar   -- analgesia with APAP 1000 mg QID and oxycodone 5 mg q4h PRN   -- PT/OT  -- follow up with ortho spine as scheduled on 2/23/23    T9 fracture (30-40% height loss)  Age indeterminate  -- noted for clinical significance     Multifocal Atrial Tachycardia  HR 70s. New on metoprolol during hospitalization  -- metoprolol XL 25 mg daily   -- follow up with cardiology as scheduled    HFrEF (EF 45-50%), HTN, HLD  SBPs 120s-130s. No weight trend. New on amlodipine, ASA, atorvastatin, metoprolol and PTA benazepril and nifedipine discontinued.   -- amlodipine 5 mg daily, ASA 81 mg daily, atorvastatin 40 mg daily, metoprolol XL 25 mg daily   -- follow BPs, weights, clinical volume status  -- BMP 1/30/23    Right Renal Cyst  Radiology report notes stability of this since 2019 which favors benign lesions. Of note had a 3.5 cm Grade 2 RCC with negative margins removed in February 2020.   -- needs outpatient urology follow up for MRI renal mass    Left Hydronephrosis  Left UPJ Obstruction  Likely chronic obstruction given normal renal function and no symptoms.   -- needs outpatient urology follow up for Lasix renogram (6-8 wks - late Feb/early March)    Hepatic Duct Dilatation  MRCP with severe intra and extrahepatic biliary ductal dilatation  -- needs outpatient GI follow up for ERCP in about 12 weeks (mid April)    Chronic Neuropathic Pain  Left Foot Drop  At home takes Norco - this was discontinued and oxycodone started during hospital sstay  -- duloxetine 60 mg in the morning and 30 mg at bedtime, gabapentin 600 mg BID at morning and bedtime and 300 mg in the afternoon     Slow Transit Constipation  --  Miralax 17g daily PRN and and Senna-S 2 tab BID PRN  -- adjust bowel regimen as needed    Fall  Physical  Deconditioning  In setting of hospitalization and underlying medical conditions  -- ongoing PT/OT      Electronically signed by:  Zeina Gill MD

## 2023-01-25 NOTE — Clinical Note
L periprosthetic femur fx s/p ORIF (1/17) and distal R radius Fx (non op) Spouse Damari. Lynn. She doesn't answer her cell - prefers Home # and leave a message. She'd like to be there when you see him if possible.

## 2023-01-29 NOTE — PROGRESS NOTES
Ortho Nursing home visit    Matthew Branch is a 89 year old male who resides at [facility]. TCU    Patient is seen today for on-site eval 2nd to fall and resulting fractures, C-spine, Right hip, and right wrist;    Closed treatment of wrist and c-spine fractures, and ORIF complex, [place]-prosthetic right hip fracture;    Today's visit is to eval progress; apply cast and remove staples and incision eval; patient is not a candidate for transportation to clinic;      Past Medical History:   Diagnosis Date     Arthritis     back, (L) hip, (L) knee     Dermatitis scalp     Foraminal stenosis of lumbar region      Hypertension      Left foot drop      Left renal mass     with resulting hydronephrosis     Neuropathic pain, leg     left     Numbness and tingling     drop foot (L) side with numbness     Prostate cancer (H)      Prostate cancer (H)      Thalassemia minor      Vitamin D deficiency      Vitamin D deficiency disease       Past Surgical History:   Procedure Laterality Date     ARTHROPLASTY HIP  1/25/2012    Procedure:ARTHROPLASTY HIP; LEFT TOTAL HIP ARTHROPLASTY (BIOMET); Surgeon:ROBERT JAMES; Location:[facility] OR     ARTHROPLASTY HIP ANTERIOR Right 8/20/2019    Procedure: DIRECT ANTERIOR RIGHT TOTAL HIP ARTHROPLASTY;  Surgeon: Anatoly Marin MD;  Location: [facility] OR     BACK SURGERY      2 back surgerys in 2011     CHOLECYSTECTOMY       GI SURGERY      prostatectomy     LAMINECTOMY LUMBAR POSTERIOR MICROSCOPIC ONE LEVEL  12/19/2012    Procedure: LAMINECTOMY LUMBAR POSTERIOR MICROSCOPIC ONE LEVEL;  Lumbar 2-3 Laminectomy Decompression;  Surgeon: Keron Guido MD;  Location: [facility]     LAPAROSCOPIC PROSTATECTOMY          Allergies   Allergen Reactions     Amitriptyline Hcl Other (See Comments)     Dry mouth     Bees Swelling     Local swelling to Hornet stings     Lamisil Rash      There were no vitals taken for this visit.     Exam:Seen today with wife in room, concern today expressed  "from his wife, that patient is having on-off : delusion and sometimes speaks with nonsensical verbiage.    Today for this examiner, patient answers questions regarding work, and where he resides appropriately ,     Supine, all staples removed from right hip incision, healing well, right wrist is examined, small area, proximal wound from splint, 1-2 cm, cleaned covered with drsg, new SAC applied,     Patients wife is concerned that he may remove the cast, as he has removed the splint, I have expressed that if he does remove the cast, we will use a removable splint, as staff can re-apply.  \  C-collar in place, patient has pain, in neck, today more than hip or wrist;'    Also examined today is patients left \" foot drop, he has an AFO but has not used this in some time per wife, I instructed her to take brace with her to shop for tennis shoes, that would fit the brace; he does need this for attempts at ambulation;.        X-rays show : C-spine, right hip, and right wrist fractures all in stable position and alignment.     ASSESSMENT / PLAN : AFO for left drop foot, WBAT : Right leg, 50 % W/B with platform walker, for hip and wrist;    F/U with Neuro as appt; sched; for instructions as length of time c-collar will be used for closed treatment of odontoid fracture,    Patients wife is concerned today regarding husbands \"delerium\" and I will defer to care team,    [id number]      [facility]-  [phone] Cell    "

## 2023-02-01 NOTE — PROGRESS NOTES
[facility] GERIATRICS  TCU Episodic Visit   February 1, 2023      Chief Complaint   Patient presents with     Nursing Home Acute       HPI:    Matthew Branch is a 89 year old  (2/25/1933), who is being seen today for an episodic care visit at [facility] TCU where he was admitted after a hospitalization with multiple fractures (C spine, R radius, R periprosthetic femur).     He was started on scheduled Norco on 1/28/23 - was taking this at home - and oxycodone was discontinued.   He had on site ortho follow up on 1/29/23 -- imaging with stable alignment.     Labs on 1/30 notable for hyponatremia to 131.     Today, Mr. Branch is seen in his room with his spouse Damari. He continues to wax and wane - being clear in the mornings, more tired after therapy and then more clear again after a nap. He continues to have intermittent head pain, but he wasn't able to describe this for me as he was pretty sleepy after therapy. He's been restless in his sleep, which is usually not the case at home. He woke up for a time and was appropriate, tracking the conversation, and then drifted back to sleep. Damari reports today is the best/hardest therapy he's had. Reviewed recent labs as well as ortho visit. Talked about things we can mitigate delirium - sleep wake cycle, nutrition, minimizing narcotics. To that end, Damari is concerned about the scheduled Norco as he would take a 1/2 tab at home 6x/day, not a full tab. Both Damari and nursing report he is eating and drinking well.     ALLERGIES: Amitriptyline hcl, Bees, and Lamisil    Past Medical, Surgical, Family and Social History reviewed and updated in EPIC.    MEDICATIONS  Current Outpatient Medications   Medication Sig Dispense Refill     amLODIPine (NORVASC) 5 MG tablet Take 5 mg by mouth daily       aspirin (ASA) 81 MG chewable tablet Take 81 mg by mouth daily       atorvastatin (LIPITOR) 40 MG tablet Take 40 mg by mouth daily       bisacodyl (DULCOLAX) 10 MG suppository  "Place 10 mg rectally daily as needed for constipation       cholecalciferol (VITAMIN D) 1000 UNIT tablet Take 1 tablet by mouth daily. 100 tablet      DULoxetine (CYMBALTA) 30 MG capsule Take 30 mg by mouth At Bedtime       DULoxetine HCl 60 MG CSDR Take 60 mg by mouth every morning        Ferrous Sulfate (IRON) 28 MG TABS Take 1 tablet by mouth daily       gabapentin (NEURONTIN) 300 MG capsule Take by mouth 3 times daily 600 mg morning and at bedtime and 300 mg at 1400       HYDROcodone-acetaminophen (NORCO) 5-325 MG tablet Take 0.5-1 tablets by mouth every 6 hours as needed for pain (0.5 tablet pain 1-5; 1 tablet pain 6-10; hold for lethargy or confusion) 30 tablet 0     metoprolol succinate ER (TOPROL XL) 25 MG 24 hr tablet Take 25 mg by mouth daily       polyethylene glycol (MIRALAX) 17 g packet Take 1 packet by mouth daily as needed for constipation       senna-docusate (SENOKOT-S/PERICOLACE) 8.6-50 MG tablet Take 2 tablets by mouth 2 times daily as needed for constipation       Medications reviewed:  Medications reconciled to facility chart and changes were made to reflect current medications as identified as above med list. Below are the changes that were made:   Medications stopped since last EPIC medication reconciliation:   Medications Discontinued During This Encounter   Medication Reason     HYDROcodone-acetaminophen (NORCO) 5-325 MG tablet Dose adjustment     Medications started since last [facility] medication reconciliation:  Orders Placed This Encounter   Medications     HYDROcodone-acetaminophen (NORCO) 5-325 MG tablet     Sig: Take 0.5-1 tablets by mouth every 6 hours as needed for pain (0.5 tablet pain 1-5; 1 tablet pain 6-10; hold for lethargy or confusion)     Dispense:  30 tablet     Refill:  0     REVIEW OF SYSTEMS:  4 point ROS neg other than the symptoms noted above in the HPI.    PHYSICAL EXAM:  /76   Pulse 66   Temp 97.7  F (36.5  C)   Resp 18   Ht 1.727 m (5' 8\")   Wt 62.5 kg (137 lb " 12.8 oz)   SpO2 99%   BMI 20.95 kg/m    Gen: sitting up in bed, alert, cooperative and in no acute distress  HEENT: C collar in place   Resp: breathing non labored, no tachypnea; some snoring   Ext: RUE short arm cast   Neuro: CX II-XII grossly in tact; ROM in all four extremities grossly in tact    LABS & IMAGING  Recent Labs and Imaging:  Reviewed as per Epic    ASSESSMENT/PLAN:    Acute Delirium  Waxes and wanes - clearest in the morning or after a nap. Scheduled Norco likely contributing. I don't think the mild hyponatremia is.   -- continue to work on good sleep/wake cycle, nutrition  -- would expect this to continue over the next week or two    Hyponatremia  Stable Na 131 on 1/30 and today. Eating and drinking well per spouse and nursing. Not on any meds that should contribute. 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 Hesitant to fluid restrict him and cause any risk for hypotension.   -- follow clinically, treat pain  -- BMP 2/7/23    Right Periprosthetic Femur Fracture s/p ORIF (1/17/23)  Secondary to a fall on 1/10/23. Surgery without complication. XR last week looked good. Had onsite ortho follow up on 1/29  -- 50% weight bearing to RLE  -- DVT ppx with ASA 81 mg daily per ortho              -- will need to get duration clarified at follow up  -- analgesia with APAP 1000 mg QID   -- discontinue scheduled Norco  -- start Norco 5-325 mg - take 1/2 to 1 tab q4h PRN per pain scale (1/2 tab pain 1-5, 1 tab pain 6-10) - hold for lethargy or confusion  -- PT/OT    Right Distal Radius Fracture  Secondary to a fall on 1/10/23. Non-op management  XR last week looked good. Had onsite ortho follow up on 1/29  -- NWB to RUE  -- analgesia as above  -- PT/OT    Odontoid fracture  C2 fracture   Secondary to a fall on 1/10/23. Non-op management. XR last week looked good. Had onsite ortho follow up on 1/29  -- C collar   -- analgesia as above  -- PT/OT  -- follow up with ortho spine as scheduled    Acute Blood Loss Anemia  Chronic  Anemia  Hgb 12.6 --> 8.2. Hgb 10.5 at TCU on 1/30. No Fe labs I can seen in Epic.   -- Fe supplement daily  -- follow clinically     Multifocal Atrial Tachycardia  HR 60s. New on metoprolol during hospitalization  -- metoprolol XL 25 mg daily   -- follow up with cardiology as scheduled    HFrEF (EF 45-50%), HTN, HLD  SBPs 120s-150s. No weight trend. New on amlodipine, ASA, atorvastatin, metoprolol and PTA benazepril and nifedipine discontinued.   -- amlodipine 5 mg daily, ASA 81 mg daily, atorvastatin 40 mg daily, metoprolol XL 25 mg daily   -- follow BPs, weights, clinical volume status    Physical Deconditioning  In setting of hospitalization and underlying medical conditions  -- ongoing PT/OT      Electronically signed by  Zeina Gill MD

## 2023-02-01 NOTE — LETTER
2/1/2023        RE: Matthew Branch  1957 [address] [facility] [id number]        [facility] GERIATRICS  TCU Episodic Visit   February 1, 2023      Chief Complaint   Patient presents with     Nursing Home Acute       HPI:    Matthew Branch is a 89 year old  (2/25/1933), who is being seen today for an episodic care visit at [facility] where he was admitted after a hospitalization with multiple fractures (C spine, R radius, R periprosthetic femur).     He was started on scheduled Norco on 1/28/23 - was taking this at home - and oxycodone was discontinued.   He had on site ortho follow up on 1/29/23 -- imaging with stable alignment.     Labs on 1/30 notable for hyponatremia to 131.     Today, Mr. Branch is seen in his room with his spouse Damari. He continues to wax and wane - being clear in the mornings, more tired after therapy and then more clear again after a nap. He continues to have intermittent head pain, but he wasn't able to describe this for me as he was pretty sleepy after therapy. He's been restless in his sleep, which is usually not the case at home. He woke up for a time and was appropriate, tracking the conversation, and then drifted back to sleep. Damari reports today is the best/hardest therapy he's had. Reviewed recent labs as well as ortho visit. Talked about things we can mitigate delirium - sleep wake cycle, nutrition, minimizing narcotics. To that end, Damari is concerned about the scheduled Norco as he would take a 1/2 tab at home 6x/day, not a full tab. Both Damari and nursing report he is eating and drinking well.     ALLERGIES: Amitriptyline hcl, Bees, and Lamisil    Past Medical, Surgical, Family and Social History reviewed and updated in EPIC.    MEDICATIONS  Current Outpatient Medications   Medication Sig Dispense Refill     amLODIPine (NORVASC) 5 MG tablet Take 5 mg by mouth daily       aspirin (ASA) 81 MG chewable tablet Take 81 mg by mouth daily       atorvastatin  (LIPITOR) 40 MG tablet Take 40 mg by mouth daily       bisacodyl (DULCOLAX) 10 MG suppository Place 10 mg rectally daily as needed for constipation       cholecalciferol (VITAMIN D) 1000 UNIT tablet Take 1 tablet by mouth daily. 100 tablet      DULoxetine (CYMBALTA) 30 MG capsule Take 30 mg by mouth At Bedtime       DULoxetine HCl 60 MG CSDR Take 60 mg by mouth every morning        Ferrous Sulfate (IRON) 28 MG TABS Take 1 tablet by mouth daily       gabapentin (NEURONTIN) 300 MG capsule Take by mouth 3 times daily 600 mg morning and at bedtime and 300 mg at 1400       HYDROcodone-acetaminophen (NORCO) 5-325 MG tablet Take 0.5-1 tablets by mouth every 6 hours as needed for pain (0.5 tablet pain 1-5; 1 tablet pain 6-10; hold for lethargy or confusion) 30 tablet 0     metoprolol succinate ER (TOPROL XL) 25 MG 24 hr tablet Take 25 mg by mouth daily       polyethylene glycol (MIRALAX) 17 g packet Take 1 packet by mouth daily as needed for constipation       senna-docusate (SENOKOT-S/PERICOLACE) 8.6-50 MG tablet Take 2 tablets by mouth 2 times daily as needed for constipation       Medications reviewed:  Medications reconciled to facility chart and changes were made to reflect current medications as identified as above med list. Below are the changes that were made:   Medications stopped since last EPIC medication reconciliation:   Medications Discontinued During This Encounter   Medication Reason     HYDROcodone-acetaminophen (NORCO) 5-325 MG tablet Dose adjustment     Medications started since last [facility] medication reconciliation:  Orders Placed This Encounter   Medications     HYDROcodone-acetaminophen (NORCO) 5-325 MG tablet     Sig: Take 0.5-1 tablets by mouth every 6 hours as needed for pain (0.5 tablet pain 1-5; 1 tablet pain 6-10; hold for lethargy or confusion)     Dispense:  30 tablet     Refill:  0     REVIEW OF SYSTEMS:  4 point ROS neg other than the symptoms noted above in the HPI.    PHYSICAL EXAM:  BP  "127/76   Pulse 66   Temp 97.7  F (36.5  C)   Resp 18   Ht 1.727 m (5' 8\")   Wt 62.5 kg (137 lb 12.8 oz)   SpO2 99%   BMI 20.95 kg/m    Gen: sitting up in bed, alert, cooperative and in no acute distress  HEENT: C collar in place   Resp: breathing non labored, no tachypnea; some snoring   Ext: RUE short arm cast   Neuro: CX II-XII grossly in tact; ROM in all four extremities grossly in tact    LABS & IMAGING  Recent Labs and Imaging:  Reviewed as per Epic    ASSESSMENT/PLAN:    Acute Delirium  Waxes and wanes - clearest in the morning or after a nap. Scheduled Norco likely contributing. I don't think the mild hyponatremia is.   -- continue to work on good sleep/wake cycle, nutrition  -- would expect this to continue over the next week or two    Hyponatremia  Stable Na 131 on 1/30 and today. Eating and drinking well per spouse and nursing. Not on any meds that should contribute. 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 Hesitant to fluid restrict him and cause any risk for hypotension.   -- follow clinically, treat pain  -- BMP 2/7/23    Right Periprosthetic Femur Fracture s/p ORIF (1/17/23)  Secondary to a fall on 1/10/23. Surgery without complication. XR last week looked good. Had onsite ortho follow up on 1/29  -- 50% weight bearing to RLE  -- DVT ppx with ASA 81 mg daily per ortho              -- will need to get duration clarified at follow up  -- analgesia with APAP 1000 mg QID   -- discontinue scheduled Norco  -- start Norco 5-325 mg - take 1/2 to 1 tab q4h PRN per pain scale (1/2 tab pain 1-5, 1 tab pain 6-10) - hold for lethargy or confusion  -- PT/OT    Right Distal Radius Fracture  Secondary to a fall on 1/10/23. Non-op management  XR last week looked good. Had onsite ortho follow up on 1/29  -- NWB to RUE  -- analgesia as above  -- PT/OT    Odontoid fracture  C2 fracture   Secondary to a fall on 1/10/23. Non-op management. XR last week looked good. Had onsite ortho follow up on 1/29  -- C collar   -- analgesia as " above  -- PT/OT  -- follow up with ortho spine as scheduled    Acute Blood Loss Anemia  Chronic Anemia  Hgb 12.6 --> 8.2. Hgb 10.5 at TCU on 1/30. No Fe labs I can seen in Epic.   -- Fe supplement daily  -- follow clinically     Multifocal Atrial Tachycardia  HR 60s. New on metoprolol during hospitalization  -- metoprolol XL 25 mg daily   -- follow up with cardiology as scheduled    HFrEF (EF 45-50%), HTN, HLD  SBPs 120s-150s. No weight trend. New on amlodipine, ASA, atorvastatin, metoprolol and PTA benazepril and nifedipine discontinued.   -- amlodipine 5 mg daily, ASA 81 mg daily, atorvastatin 40 mg daily, metoprolol XL 25 mg daily   -- follow BPs, weights, clinical volume status    Physical Deconditioning  In setting of hospitalization and underlying medical conditions  -- ongoing PT/OT      Electronically signed by  Zeina Gill MD                          Sincerely,        Zeina Gill MD

## 2023-02-07 NOTE — LETTER
2/7/2023        RE: Matthew Branch  1957 [address] [id number]        [facility] GERIATRICS  TCU Episodic Visit   February 7, 2023    Chief Complaint   Patient presents with     RECHECK       HPI:    Matthew Branch is a 89 year old  (2/25/1933), who is being seen today for an episodic care visit at [facility] where he was admitted after a hospitalization with multiple fractures (C spine, R radius, R periprosthetic femur). TCU stay with mild hyponatremia, delirium and variable pain control.     Today, Mr. Branch is seen in his room sitting up in bed.  His spouse Damari is present.  He is as alert and interactive as I have seen to date.  There does seem to be a correlation between working with therapy and a period of rest the sleep afterwards.  Damari notes that over the weekend when therapy was lighter his cognition was good the vast majority of the time versus some of the waxing and waning during the week.  That said the waxing and waning is also slowly improving.      He continues have some intermittent pain along the front top of his head with therapy that feels muscular.  He is able to push through this and it is improving.  Therapy is going to get him up with a platform walker today and try some partial weightbearing on that right leg.      Reviewed vital signs and labs.  He continues to eat and drink well.  He is needing a suppository every other day and we discussed continuing this as adding a bowel regimen could result in more frequent loose stools that will be difficult to manage with his mobility.  No concerns today per discussion with nursing    ALLERGIES: Amitriptyline hcl, Bees, and Lamisil    Past Medical, Surgical, Family and Social History reviewed and updated in EPIC.    MEDICATIONS  Current Outpatient Medications   Medication Sig Dispense Refill     amLODIPine (NORVASC) 5 MG tablet Take 5 mg by mouth daily       aspirin (ASA) 81 MG chewable tablet Take 81 mg by  "mouth daily       atorvastatin (LIPITOR) 40 MG tablet Take 40 mg by mouth daily       bisacodyl (DULCOLAX) 10 MG suppository Place 10 mg rectally daily as needed for constipation       cholecalciferol (VITAMIN D) 1000 UNIT tablet Take 1 tablet by mouth daily. 100 tablet      DULoxetine (CYMBALTA) 30 MG capsule Take 30 mg by mouth At Bedtime       DULoxetine HCl 60 MG CSDR Take 60 mg by mouth every morning        Ferrous Sulfate (IRON) 28 MG TABS Take 1 tablet by mouth daily       gabapentin (NEURONTIN) 300 MG capsule Take by mouth 3 times daily 600 mg morning and at bedtime and 300 mg at 1400       HYDROcodone-acetaminophen (NORCO) 5-325 MG tablet Take 0.5-1 tablets by mouth every 6 hours as needed for pain (0.5 tablet pain 1-5; 1 tablet pain 6-10; hold for lethargy or confusion) (Patient taking differently: Take 0.5-1 tablets by mouth every 4 hours as needed for pain (0.5 tablet pain 1-5; 1 tablet pain 6-10; hold for lethargy or confusion)) 30 tablet 0     metoprolol succinate ER (TOPROL XL) 25 MG 24 hr tablet Take 25 mg by mouth daily       polyethylene glycol (MIRALAX) 17 g packet Take 1 packet by mouth daily as needed for constipation       senna-docusate (SENOKOT-S/PERICOLACE) 8.6-50 MG tablet Take 2 tablets by mouth 2 times daily as needed for constipation       Medications reviewed:  Medications reconciled to facility chart and changes were made to reflect current medications as identified as above med list. Below are the changes that were made:   Medications stopped since last EPIC medication reconciliation:   There are no discontinued medications.  Medications started since last [facility] medication reconciliation:  No orders of the defined types were placed in this encounter.      REVIEW OF SYSTEMS:  4 point ROS neg other than the symptoms noted above in the HPI.    PHYSICAL EXAM:  /69   Pulse 76   Temp 97.1  F (36.2  C)   Resp 18   Ht 1.727 m (5' 8\")   Wt 62.5 kg (137 lb 12.8 oz)   SpO2 94%   BMI " 20.95 kg/m    Gen: sitting in bed, alert, cooperative and in no acute distress  HEENT: normocephalic; oropharynx clear  Card: RRR, S1, S2, no murmurs  Resp: lungs clear to auscultation bilaterally  GI: abdomen soft, not-tender, non-distended, +BS  Ext: no LE edema  Neuro: CX II-XII grossly in tact; ROM in all four extremities grossly in tact  Psych: alert and oriented x3; normal affect    LABS & IMAGING  Recent Labs and Imaging:  Reviewed as per Epic    ASSESSMENT/PLAN:    Acute Delirium - Slowly Improving   Waxes and wanes - had a good, clear weekend with lighter therapies. Continues to have periods of confusion, but slow improvement.   -- continue to work on good sleep/wake cycle, nutrition  -- would expect this to continue slow resolution      Hyponatremia  Stable Na 131 on 1/30 and 2/2. .Eating and drinking well per spouse and nursing. Not on any meds that should contribute. 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  -- follow clinically, treat pain  -- BMP 2/8/23     Right Periprosthetic Femur Fracture s/p ORIF (1/17/23)  Secondary to a fall on 1/10/23. Surgery without. Had onsite ortho follow up on 1/29  -- 50% weight bearing to RLE  -- DVT ppx with ASA 81 mg daily per ortho              -- will need to get duration clarified at follow up  -- analgesia with Norco 5-325 mg - take 1/2 to 1 tab q4h PRN per pain scale (1/2 tab pain 1-5, 1 tab pain 6-10) - hold for lethargy or confusion  -- PT/OT     Right Distal Radius Fracture  Secondary to a fall on 1/10/23. Non-op management  Had onsite ortho follow up on 1/29  -- NWB to RUE  -- analgesia as above  -- PT/OT     Odontoid fracture  C2 fracture   Secondary to a fall on 1/10/23. Non-op management. Had onsite ortho follow up on 1/29  -- C collar   -- analgesia as above  -- PT/OT  -- follow up with ortho spine as scheduled     Multifocal Atrial Tachycardia  HR 60s-70s. New on metoprolol during hospitalization  -- metoprolol XL 25 mg daily   -- follow up with cardiology as  scheduled     HFrEF (EF 45-50%), HTN, HLD  SBPs 120s-150s. Weight 146 lvs --> 138 lbs. New on amlodipine, ASA, atorvastatin, metoprolol and PTA benazepril and nifedipine discontinued.   -- amlodipine 5 mg daily, ASA 81 mg daily, atorvastatin 40 mg daily, metoprolol XL 25 mg daily   -- follow BPs, weights, clinical volume status    Acute Blood Loss Anemia  Chronic Anemia  Hgb 12.6 --> 8.2. Hgb 10.5 at TCU on 1/30. No Fe labs I can seen in Epic.   -- Fe supplement daily  -- follow clinically      Physical Deconditioning  In setting of hospitalization and underlying medical conditions  -- ongoing PT/OT    Electronically signed by  Zeina Gill MD                          Sincerely,        Zeina Gill MD

## 2023-02-07 NOTE — PROGRESS NOTES
[facility] GERIATRICS  TCU Episodic Visit   February 7, 2023    Chief Complaint   Patient presents with     RECHECK       HPI:    Matthew Branch is a 89 year old  (2/25/1933), who is being seen today for an episodic care visit at [facility] TCU where he was admitted after a hospitalization with multiple fractures (C spine, R radius, R periprosthetic femur). TCU stay with mild hyponatremia, delirium and variable pain control.     Today, Mr. Branch is seen in his room sitting up in bed.  His spouse Damari is present.  He is as alert and interactive as I have seen to date.  There does seem to be a correlation between working with therapy and a period of rest the sleep afterwards.  Damari notes that over the weekend when therapy was lighter his cognition was good the vast majority of the time versus some of the waxing and waning during the week.  That said the waxing and waning is also slowly improving.      He continues have some intermittent pain along the front top of his head with therapy that feels muscular.  He is able to push through this and it is improving.  Therapy is going to get him up with a platform walker today and try some partial weightbearing on that right leg.      Reviewed vital signs and labs.  He continues to eat and drink well.  He is needing a suppository every other day and we discussed continuing this as adding a bowel regimen could result in more frequent loose stools that will be difficult to manage with his mobility.  No concerns today per discussion with nursing    ALLERGIES: Amitriptyline hcl, Bees, and Lamisil    Past Medical, Surgical, Family and Social History reviewed and updated in EPIC.    MEDICATIONS  Current Outpatient Medications   Medication Sig Dispense Refill     amLODIPine (NORVASC) 5 MG tablet Take 5 mg by mouth daily       aspirin (ASA) 81 MG chewable tablet Take 81 mg by mouth daily       atorvastatin (LIPITOR) 40 MG tablet Take 40 mg by mouth daily       bisacodyl  "(DULCOLAX) 10 MG suppository Place 10 mg rectally daily as needed for constipation       cholecalciferol (VITAMIN D) 1000 UNIT tablet Take 1 tablet by mouth daily. 100 tablet      DULoxetine (CYMBALTA) 30 MG capsule Take 30 mg by mouth At Bedtime       DULoxetine HCl 60 MG CSDR Take 60 mg by mouth every morning        Ferrous Sulfate (IRON) 28 MG TABS Take 1 tablet by mouth daily       gabapentin (NEURONTIN) 300 MG capsule Take by mouth 3 times daily 600 mg morning and at bedtime and 300 mg at 1400       HYDROcodone-acetaminophen (NORCO) 5-325 MG tablet Take 0.5-1 tablets by mouth every 6 hours as needed for pain (0.5 tablet pain 1-5; 1 tablet pain 6-10; hold for lethargy or confusion) (Patient taking differently: Take 0.5-1 tablets by mouth every 4 hours as needed for pain (0.5 tablet pain 1-5; 1 tablet pain 6-10; hold for lethargy or confusion)) 30 tablet 0     metoprolol succinate ER (TOPROL XL) 25 MG 24 hr tablet Take 25 mg by mouth daily       polyethylene glycol (MIRALAX) 17 g packet Take 1 packet by mouth daily as needed for constipation       senna-docusate (SENOKOT-S/PERICOLACE) 8.6-50 MG tablet Take 2 tablets by mouth 2 times daily as needed for constipation       Medications reviewed:  Medications reconciled to facility chart and changes were made to reflect current medications as identified as above med list. Below are the changes that were made:   Medications stopped since last EPIC medication reconciliation:   There are no discontinued medications.  Medications started since last [facility] medication reconciliation:  No orders of the defined types were placed in this encounter.      REVIEW OF SYSTEMS:  4 point ROS neg other than the symptoms noted above in the HPI.    PHYSICAL EXAM:  /69   Pulse 76   Temp 97.1  F (36.2  C)   Resp 18   Ht 1.727 m (5' 8\")   Wt 62.5 kg (137 lb 12.8 oz)   SpO2 94%   BMI 20.95 kg/m    Gen: sitting in bed, alert, cooperative and in no acute distress  HEENT: " normocephalic; oropharynx clear  Card: RRR, S1, S2, no murmurs  Resp: lungs clear to auscultation bilaterally  GI: abdomen soft, not-tender, non-distended, +BS  Ext: no LE edema  Neuro: CX II-XII grossly in tact; ROM in all four extremities grossly in tact  Psych: alert and oriented x3; normal affect    LABS & IMAGING  Recent Labs and Imaging:  Reviewed as per Epic    ASSESSMENT/PLAN:    Acute Delirium - Slowly Improving   Waxes and wanes - had a good, clear weekend with lighter therapies. Continues to have periods of confusion, but slow improvement.   -- continue to work on good sleep/wake cycle, nutrition  -- would expect this to continue slow resolution      Hyponatremia  Stable Na 131 on 1/30 and 2/2. .Eating and drinking well per spouse and nursing. Not on any meds that should contribute. 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  -- follow clinically, treat pain  -- BMP 2/8/23     Right Periprosthetic Femur Fracture s/p ORIF (1/17/23)  Secondary to a fall on 1/10/23. Surgery without. Had onsite ortho follow up on 1/29  -- 50% weight bearing to RLE  -- DVT ppx with ASA 81 mg daily per ortho              -- will need to get duration clarified at follow up  -- analgesia with Norco 5-325 mg - take 1/2 to 1 tab q4h PRN per pain scale (1/2 tab pain 1-5, 1 tab pain 6-10) - hold for lethargy or confusion  -- PT/OT     Right Distal Radius Fracture  Secondary to a fall on 1/10/23. Non-op management  Had onsite ortho follow up on 1/29  -- NWB to RUE  -- analgesia as above  -- PT/OT     Odontoid fracture  C2 fracture   Secondary to a fall on 1/10/23. Non-op management. Had onsite ortho follow up on 1/29  -- C collar   -- analgesia as above  -- PT/OT  -- follow up with ortho spine as scheduled     Multifocal Atrial Tachycardia  HR 60s-70s. New on metoprolol during hospitalization  -- metoprolol XL 25 mg daily   -- follow up with cardiology as scheduled     HFrEF (EF 45-50%), HTN, HLD  SBPs 120s-150s. Weight 146 lvs --> 138 lbs. New on  amlodipine, ASA, atorvastatin, metoprolol and PTA benazepril and nifedipine discontinued.   -- amlodipine 5 mg daily, ASA 81 mg daily, atorvastatin 40 mg daily, metoprolol XL 25 mg daily   -- follow BPs, weights, clinical volume status    Acute Blood Loss Anemia  Chronic Anemia  Hgb 12.6 --> 8.2. Hgb 10.5 at TCU on 1/30. No Fe labs I can seen in Epic.   -- Fe supplement daily  -- follow clinically      Physical Deconditioning  In setting of hospitalization and underlying medical conditions  -- ongoing PT/OT    Electronically signed by  Zeina Gill MD

## 2023-02-09 NOTE — PROGRESS NOTES
[facility] GERIATRIC SERVICES  Chief Complaint   Patient presents with     MRAIYA     [place] Medical Record Number:  [id number]  Place of Service where encounter took place:  [facility] [[id number]]  Code Status:  Full Code     HISTORY:      HPI:  Matthew Branch  is 89 year old (2/25/1933) undergoing physical and occupational therapy.  He is with a history of HTN, renal mass , Arthritis, who admitted 1/1/2023 after presenting to the ED for evaluation of fall with hip and wrist pain, found to have right periprosthetic hip fracture and distal radius fracture.  Excerpted from records   In the ED he was hypertensive but stable. Labs notable for creatinine 1.15, WBC 15.9, hemoglobin 12.6, INR 1.1, troponin 0.074. CXR showed cardiomegaly with coarse interstitial opacities but no infiltrate. XR showed R periprosthetic hip fracture, and R distal radius fracture (formal read still pending). Ortho consulted. CT cervical spine showed numerous cervical spine fractures (formal read pending). CT C/A/P with severe left hydronephrosis. CT thoracic and lumbar spine showed age-indeterminate T9 superior endplate compression fracture (30 to 40% height loss, no significant retropulsion). Trauma surgery evaluated patient and notes multiple fractures including cervical spine fracture, odontoid fracture, C2 fracture and right distal radius fracture.    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 Patient is asymptomatic and kidney function is normal, urology indicates no indication for intervention. Urology recommends observation at this time and follow-up in 6 to 8 weeks for a Lasix renogram.    [facility] consulted and MRI cervical spine obtained and no evidence for transverse ligamentous injury or malalignment, there is a C1 posterior arch fracture, left occipital condylar fracture and cervical spondylosis. No surgical intervention warranted, recommend cervical collar for 8 to 12 weeks.  Patient will follow-up with [facility] in 4 to 6 weeks for reevaluation.    Also noted on the MRCP was a suspicious left mid renal mass that demonstrates cystic and solid components. A nonemergent outpatient renal mass MRI is recommended. This should not delay surgery. Urology and PCP should be made aware of this lesion. Discussed with wife and patient.    Orthopedic surgery consulted for radial fracture and right periprosthetic femur fracture. Nonoperative management for wrist fracture, splint placed in ED. CT of the right hip and femur obtained and again revealed an acute right proximal femur periprosthetic fracture, no additional fractures identified. Also noted was a right hip intramuscular hematoma. Patient was taken to OR on 01/17 and had PERIPROSTHETIC RIGHT HIP OPEN REDUCTION INTERNAL FIXATION.       Today he is seen to review vital signs, labs, establish care and a routine visit.  He was seen at the bedside with his wife present.  He is with C-spine fracture wearing a collar continuously and also with a right radius fracture non operative and in a cast. and right femur OFIF..  He is having periods of delirium.  He was alert and oriented x3 when asked questions however he then continued to motion that he was turning the page on a newspaper that was not there.  His wife did not want his pain medications decreased any further however his Cymbalta was decreased.  He later worked with therapy and became unresponsive for a short period and was sent to the hospital for further evaluation.  He also was noted to have mild hyponatremia his recheck was 134 up from 131.  Vital signs are stable.  He does have a cast on  right wrist, he denied numbness or tingling.     ALLERGIES:Amitriptyline hcl, Bees, and Lamisil    PAST MEDICAL HISTORY:   Past Medical History:   Diagnosis Date     Arthritis     back, (L) hip, (L) knee     Dermatitis scalp     Foraminal stenosis of lumbar region      Hypertension      Left foot drop       Left renal mass     with resulting hydronephrosis     Neuropathic pain, leg     left     Numbness and tingling     drop foot (L) side with numbness     Prostate cancer (H)      Prostate cancer (H)      Thalassemia minor      Vitamin D deficiency      Vitamin D deficiency disease        PAST SURGICAL HISTORY:   has a past surgical history that includes back surgery; Cholecystectomy; Laparoscopic prostatectomy; Laminectomy lumbar posterior microscopic one level (12/19/2012); Arthroplasty hip (1/25/2012); GI surgery; and Arthroplasty Hip Anterior (Right, 8/20/2019).    FAMILY HISTORY: family history is not on file.    SOCIAL HISTORY:  reports that he has never smoked. He has never used smokeless tobacco. He reports current alcohol use. He reports that he does not use drugs.    ROS:  Constitutional: Negative for activity change, appetite change, fatigue and fever.   HENT: Negative for congestion.    Respiratory: Negative for cough, shortness of breath and wheezing.    Cardiovascular: Negative for chest pain and leg swelling.   Gastrointestinal: Negative for abdominal distention, abdominal pain, constipation, diarrhea and nausea.   Genitourinary: Negative for dysuria.   Musculoskeletal: Negative for arthralgia. Negative for back pain.  Cast right wrist, right hip incision healed.   Skin: Negative for color change and wound.   Neurological: Negative for dizziness.  Cervical collar at all times psychiatric/Behavioral: Negative for agitation, behavioral problems and confusion. Periods of hallucinations     Physical Exam:  Constitutional:       Appearance: Patient is well-developed.   HENT:      Head: Normocephalic.   Eyes:      Conjunctiva/sclera: Conjunctivae normal.   Neck:      Musculoskeletal: Normal range of motion.   Cardiovascular:      Rate and Rhythm: Normal rate and regular rhythm.      Heart sounds: Normal heart sounds. No murmur.   Pulmonary:      Effort: No respiratory distress.      Breath sounds: Normal  "breath sounds. No wheezing or rales.   Abdominal:      General: Bowel sounds are normal. There is no distension.      Palpations: Abdomen is soft.      Tenderness: There is no abdominal tenderness.   Musculoskeletal:       Normal range of motion.   healed right hip incision   Skin:General:        Skin is warm.   Neurological:         Mental Status: Patient is alert and oriented to person, place, and time.  Patient with periods of hallucination  Psychiatric:         Behavior: Behavior normal.     Vitals:/80   Pulse 68   Temp 96.9  F (36.1  C)   Resp 18   Ht 1.727 m (5' 8\")   Wt 62.5 kg (137 lb 12.8 oz)   SpO2 96%   BMI 20.95 kg/m   and Body mass index is 20.95 kg/m .    Lab/Diagnostic data:   Recent Results (from the past 240 hour(s))   Basic metabolic panel    Collection Time: 02/08/23  5:05 AM   Result Value Ref Range    Sodium 134 (L) 136 - 145 mmol/L    Potassium 3.4 3.4 - 5.3 mmol/L    Chloride 93 (L) 98 - 107 mmol/L    Carbon Dioxide (CO2) 30 (H) 22 - 29 mmol/L    Anion Gap 11 7 - 15 mmol/L    Urea Nitrogen 15.7 8.0 - 23.0 mg/dL    Creatinine 0.89 0.67 - 1.17 mg/dL    Calcium 8.7 (L) 8.8 - 10.2 mg/dL    Glucose 90 70 - 99 mg/dL    GFR Estimate 82 >60 mL/min/1.73m2     MEDICATIONS:     Review of your medicines          Accurate as of February 9, 2023 11:59 PM. If you have any questions, ask your nurse or doctor.            CONTINUE these medicines which may have CHANGED, or have new prescriptions. If we are uncertain of the size of tablets/capsules you have at home, strength may be listed as something that might have changed.      Dose / Directions   DULoxetine HCl 60 MG Csdr  This may have changed: Another medication with the same name was removed. Continue taking this medication, and follow the directions you see here.  Changed by: Joy Soares CNP      Dose: 60 mg  Take 60 mg by mouth every morning  Refills: 0     HYDROcodone-acetaminophen 5-325 MG tablet  Commonly known as: NORCO  This may have " changed: when to take this  Used for: Periprosthetic fracture of femur at tip of prosthesis, subsequent encounter      Dose: 0.5-1 tablet  Take 0.5-1 tablets by mouth every 6 hours as needed for pain (0.5 tablet pain 1-5; 1 tablet pain 6-10; hold for lethargy or confusion)  Quantity: 30 tablet  Refills: 0        CONTINUE these medicines which have NOT CHANGED      Dose / Directions   amLODIPine 5 MG tablet  Commonly known as: NORVASC      Dose: 5 mg  Take 5 mg by mouth daily  Refills: 0     aspirin 81 MG chewable tablet  Commonly known as: ASA      Dose: 81 mg  Take 81 mg by mouth daily  Refills: 0     atorvastatin 40 MG tablet  Commonly known as: LIPITOR      Dose: 40 mg  Take 40 mg by mouth daily  Refills: 0     bisacodyl 10 MG suppository  Commonly known as: DULCOLAX      Dose: 10 mg  Place 10 mg rectally daily as needed for constipation  Refills: 0     gabapentin 300 MG capsule  Commonly known as: NEURONTIN      Take by mouth 3 times daily 600 mg morning and at bedtime and 300 mg at 1400  Refills: 0     Iron 28 MG Tabs      Dose: 1 tablet  Take 1 tablet by mouth daily  Refills: 0     metoprolol succinate ER 25 MG 24 hr tablet  Commonly known as: TOPROL XL      Dose: 25 mg  Take 25 mg by mouth daily  Refills: 0     polyethylene glycol 17 g packet  Commonly known as: MIRALAX      Dose: 1 packet  Take 1 packet by mouth daily as needed for constipation  Refills: 0     senna-docusate 8.6-50 MG tablet  Commonly known as: SENOKOT-S/PERICOLACE      Dose: 2 tablet  Take 2 tablets by mouth 2 times daily as needed for constipation  Refills: 0     vitamin D3 1000 units (25 mcg) tablet  Commonly known as: CHOLECALCIFEROL  Used for: Status post THR (total hip replacement)      Dose: 1,000 Units  Take 1 tablet by mouth daily.  Quantity: 100 tablet  Refills: 0            ASSESSMENT/PLAN  Encounter Diagnoses   Name Primary?     Closed nondisplaced fracture of second cervical vertebra with routine healing, unspecified fracture  morphology, subsequent encounter Yes     Hyponatremia      Physical deconditioning      Pain management      Closed fracture of second cervical vertebrae follow-up with orthopedics, pain control    Hyponatremia monitor labs last sodium 134 up from 131    Physical deconditioning PT OT    Hypertension on Norvasc, metoprolol succinate    HDL continue atorvastatin    Pain management on Cymbalta, gabapentin, Norco as needed Cymbalta decreased from 90 mg to 60 mg.  At bedtime dose was discontinued      Electronically signed by: Joy Soares CNP

## 2023-02-09 NOTE — LETTER
2/9/2023        RE: Matthew Branch  1957 [facility] [place] [id number]         HEALTH GERIATRIC SERVICES  Chief Complaint   Patient presents with     MARIYA     [place] Medical Record Number:  [id number]  Place of Service where encounter took place:  [facility] ([facility]) [[id number]]  Code Status:  Full Code     HISTORY:      HPI:  Matthew Branch  is 89 year old (2/25/1933) undergoing physical and occupational therapy.  He is with a history of HTN, renal mass , Arthritis, who admitted 1/1/2023 after presenting to the ED for evaluation of fall with hip and wrist pain, found to have right periprosthetic hip fracture and distal radius fracture.  Excerpted from records   In the ED he was hypertensive but stable. Labs notable for creatinine 1.15, WBC 15.9, hemoglobin 12.6, INR 1.1, troponin 0.074. CXR showed cardiomegaly with coarse interstitial opacities but no infiltrate. XR showed R periprosthetic hip fracture, and R distal radius fracture (formal read still pending). Ortho consulted. CT cervical spine showed numerous cervical spine fractures (formal read pending). CT C/A/P with severe left hydronephrosis. CT thoracic and lumbar spine showed age-indeterminate T9 superior endplate compression fracture (30 to 40% height loss, no significant retropulsion). Trauma surgery evaluated patient and notes multiple fractures including cervical spine fracture, odontoid fracture, C2 fracture and right distal radius fracture.    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 Patient is asymptomatic and kidney function is normal, urology indicates no indication for intervention. Urology recommends observation at this time and follow-up in 6 to 8 weeks for a Lasix renogram.    [place] [facility] consulted and MRI cervical spine obtained and no evidence for transverse ligamentous injury or malalignment, there is a C1 posterior arch fracture, left occipital condylar fracture and cervical  spondylosis. No surgical intervention warranted, recommend cervical collar for 8 to 12 weeks. Patient will follow-up with [place] [facility] in 4 to 6 weeks for reevaluation.    Also noted on the MRCP was a suspicious left mid renal mass that demonstrates cystic and solid components. A nonemergent outpatient renal mass MRI is recommended. This should not delay surgery. Urology and PCP should be made aware of this lesion. Discussed with wife and patient.    Orthopedic surgery consulted for radial fracture and right periprosthetic femur fracture. Nonoperative management for wrist fracture, splint placed in ED. CT of the right hip and femur obtained and again revealed an acute right proximal femur periprosthetic fracture, no additional fractures identified. Also noted was a right hip intramuscular hematoma. Patient was taken to OR on 01/17 and had PERIPROSTHETIC RIGHT HIP OPEN REDUCTION INTERNAL FIXATION.       Today he is seen to review vital signs, labs, establish care and a routine visit.  He was seen at the bedside with his wife present.  He is with C-spine fracture wearing a collar continuously and also with a right radius fracture non operative and in a cast. and right femur OFIF..  He is having periods of delirium.  He was alert and oriented x3 when asked questions however he then continued to motion that he was turning the page on a newspaper that was not there.  His wife did not want his pain medications decreased any further however his Cymbalta was decreased.  He later worked with therapy and became unresponsive for a short period and was sent to the hospital for further evaluation.  He also was noted to have mild hyponatremia his recheck was 134 up from 131.  Vital signs are stable.  He does have a cast on  right wrist, he denied numbness or tingling.     ALLERGIES:Amitriptyline hcl, Bees, and Lamisil    PAST MEDICAL HISTORY:   Past Medical History:   Diagnosis Date     Arthritis     back, (L) hip, (L) knee      Dermatitis scalp     Foraminal stenosis of lumbar region      Hypertension      Left foot drop      Left renal mass     with resulting hydronephrosis     Neuropathic pain, leg     left     Numbness and tingling     drop foot (L) side with numbness     Prostate cancer (H)      Prostate cancer (H)      Thalassemia minor      Vitamin D deficiency      Vitamin D deficiency disease        PAST SURGICAL HISTORY:   has a past surgical history that includes back surgery; Cholecystectomy; Laparoscopic prostatectomy; Laminectomy lumbar posterior microscopic one level (12/19/2012); Arthroplasty hip (1/25/2012); GI surgery; and Arthroplasty Hip Anterior (Right, 8/20/2019).    FAMILY HISTORY: family history is not on file.    SOCIAL HISTORY:  reports that he has never smoked. He has never used smokeless tobacco. He reports current alcohol use. He reports that he does not use drugs.    ROS:  Constitutional: Negative for activity change, appetite change, fatigue and fever.   HENT: Negative for congestion.    Respiratory: Negative for cough, shortness of breath and wheezing.    Cardiovascular: Negative for chest pain and leg swelling.   Gastrointestinal: Negative for abdominal distention, abdominal pain, constipation, diarrhea and nausea.   Genitourinary: Negative for dysuria.   Musculoskeletal: Negative for arthralgia. Negative for back pain.  Cast right wrist, right hip incision healed.   Skin: Negative for color change and wound.   Neurological: Negative for dizziness.  Cervical collar at all times psychiatric/Behavioral: Negative for agitation, behavioral problems and confusion. Periods of hallucinations     Physical Exam:  Constitutional:       Appearance: Patient is well-developed.   HENT:      Head: Normocephalic.   Eyes:      Conjunctiva/sclera: Conjunctivae normal.   Neck:      Musculoskeletal: Normal range of motion.   Cardiovascular:      Rate and Rhythm: Normal rate and regular rhythm.      Heart sounds: Normal heart  "sounds. No murmur.   Pulmonary:      Effort: No respiratory distress.      Breath sounds: Normal breath sounds. No wheezing or rales.   Abdominal:      General: Bowel sounds are normal. There is no distension.      Palpations: Abdomen is soft.      Tenderness: There is no abdominal tenderness.   Musculoskeletal:       Normal range of motion.   healed right hip incision   Skin:General:        Skin is warm.   Neurological:         Mental Status: Patient is alert and oriented to person, place, and time.  Patient with periods of hallucination  Psychiatric:         Behavior: Behavior normal.     Vitals:/80   Pulse 68   Temp 96.9  F (36.1  C)   Resp 18   Ht 1.727 m (5' 8\")   Wt 62.5 kg (137 lb 12.8 oz)   SpO2 96%   BMI 20.95 kg/m   and Body mass index is 20.95 kg/m .    Lab/Diagnostic data:   Recent Results (from the past 240 hour(s))   Basic metabolic panel    Collection Time: 02/08/23  5:05 AM   Result Value Ref Range    Sodium 134 (L) 136 - 145 mmol/L    Potassium 3.4 3.4 - 5.3 mmol/L    Chloride 93 (L) 98 - 107 mmol/L    Carbon Dioxide (CO2) 30 (H) 22 - 29 mmol/L    Anion Gap 11 7 - 15 mmol/L    Urea Nitrogen 15.7 8.0 - 23.0 mg/dL    Creatinine 0.89 0.67 - 1.17 mg/dL    Calcium 8.7 (L) 8.8 - 10.2 mg/dL    Glucose 90 70 - 99 mg/dL    GFR Estimate 82 >60 mL/min/1.73m2     MEDICATIONS:     Review of your medicines          Accurate as of February 9, 2023 11:59 PM. If you have any questions, ask your nurse or doctor.            CONTINUE these medicines which may have CHANGED, or have new prescriptions. If we are uncertain of the size of tablets/capsules you have at home, strength may be listed as something that might have changed.      Dose / Directions   DULoxetine HCl 60 MG Csdr  This may have changed: Another medication with the same name was removed. Continue taking this medication, and follow the directions you see here.  Changed by: Joy Soares CNP      Dose: 60 mg  Take 60 mg by mouth every " morning  Refills: 0     HYDROcodone-acetaminophen 5-325 MG tablet  Commonly known as: NORCO  This may have changed: when to take this  Used for: Periprosthetic fracture of femur at tip of prosthesis, subsequent encounter      Dose: 0.5-1 tablet  Take 0.5-1 tablets by mouth every 6 hours as needed for pain (0.5 tablet pain 1-5; 1 tablet pain 6-10; hold for lethargy or confusion)  Quantity: 30 tablet  Refills: 0        CONTINUE these medicines which have NOT CHANGED      Dose / Directions   amLODIPine 5 MG tablet  Commonly known as: NORVASC      Dose: 5 mg  Take 5 mg by mouth daily  Refills: 0     aspirin 81 MG chewable tablet  Commonly known as: ASA      Dose: 81 mg  Take 81 mg by mouth daily  Refills: 0     atorvastatin 40 MG tablet  Commonly known as: LIPITOR      Dose: 40 mg  Take 40 mg by mouth daily  Refills: 0     bisacodyl 10 MG suppository  Commonly known as: DULCOLAX      Dose: 10 mg  Place 10 mg rectally daily as needed for constipation  Refills: 0     gabapentin 300 MG capsule  Commonly known as: NEURONTIN      Take by mouth 3 times daily 600 mg morning and at bedtime and 300 mg at 1400  Refills: 0     Iron 28 MG Tabs      Dose: 1 tablet  Take 1 tablet by mouth daily  Refills: 0     metoprolol succinate ER 25 MG 24 hr tablet  Commonly known as: TOPROL XL      Dose: 25 mg  Take 25 mg by mouth daily  Refills: 0     polyethylene glycol 17 g packet  Commonly known as: MIRALAX      Dose: 1 packet  Take 1 packet by mouth daily as needed for constipation  Refills: 0     senna-docusate 8.6-50 MG tablet  Commonly known as: SENOKOT-S/PERICOLACE      Dose: 2 tablet  Take 2 tablets by mouth 2 times daily as needed for constipation  Refills: 0     vitamin D3 1000 units (25 mcg) tablet  Commonly known as: CHOLECALCIFEROL  Used for: Status post THR (total hip replacement)      Dose: 1,000 Units  Take 1 tablet by mouth daily.  Quantity: 100 tablet  Refills: 0            ASSESSMENT/PLAN  Encounter Diagnoses   Name Primary?      Closed nondisplaced fracture of second cervical vertebra with routine healing, unspecified fracture morphology, subsequent encounter Yes     Hyponatremia      Physical deconditioning      Pain management      Closed fracture of second cervical vertebrae follow-up with orthopedics, pain control    Hyponatremia monitor labs last sodium 134 up from 131    Physical deconditioning PT OT    Hypertension on Norvasc, metoprolol succinate    HDL continue atorvastatin    Pain management on Cymbalta, gabapentin, Norco as needed Cymbalta decreased from 90 mg to 60 mg.  At bedtime dose was discontinued      Electronically signed by: Joy Soares CNP        Sincerely,        Joy Soares CNP

## 2023-02-09 NOTE — TELEPHONE ENCOUNTER
[facility]phone En[facility]    Provider: OSWALDO Maciel  Facility: [facility] Facility Type:  TCU    Caller: Vince  Call Back Number: [phone]    Allergies:    Allergies   Allergen Reactions     Amitriptyline Hcl Other (See Comments)     Dry mouth     Bees Swelling     Local swelling to [place] stings     Lamisil Rash        Reason for call: Nurse is calling to report that while patient was working with therapy he had an episode where his hands and feet were shaking and then he also was noted to be listless and staring off and unresponsive.  He became more alert after he was helped into bed.  VSS.  NP saw patient today and also noted that he was hallucinating.  She noted he was moving his arms and when she asked what he was doing, he said he was reading the newspaper, however he didn't have a newspaper.      Verbal Order/Direction given by Provider: Send patient to the ER due to episodes of shaking/unresponsiveness and hallucinations.      Provider giving Order:  OSWALDO Maciel    Verbal Order given to: Vince Gillis RN

## 2023-02-14 NOTE — PROGRESS NOTES
[facility] GERIATRIC SERVICES  Chief Complaint   Patient presents with     MARIYA     [place] Medical Record Number:  [id number]  Place of Service where encounter took place:  [facility] ([facility]) [[id number]]  Code Status:  Full Code     HISTORY:      HPI:  Matthew Branch  is 89 year old (2/25/1933) undergoing physical and occupational therapy.  He is with a history of HTN, renal mass , Arthritis, who admitted 1/1/2023 after presenting to the ED for evaluation of fall with hip and wrist pain, found to have right periprosthetic hip fracture and distal radius fracture.  Excerpted from records   In the ED he was hypertensive but stable. Labs notable for creatinine 1.15, WBC 15.9, hemoglobin 12.6, INR 1.1, troponin 0.074. CXR showed cardiomegaly with coarse interstitial opacities but no infiltrate. XR showed R periprosthetic hip fracture, and R distal radius fracture (formal read still pending). Ortho consulted. CT cervical spine showed numerous cervical spine fractures (formal read pending). CT C/A/P with severe left hydronephrosis. CT thoracic and lumbar spine showed age-indeterminate T9 superior endplate compression fracture (30 to 40% height loss, no significant retropulsion). Trauma surgery evaluated patient and notes multiple fractures including cervical spine fracture, odontoid fracture, C2 fracture and right distal radius fracture.    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 Patient is asymptomatic and kidney function is normal, urology indicates no indication for intervention. Urology recommends observation at this time and follow-up in 6 to 8 weeks for a Lasix renogram.    [place] [facility] consulted and MRI cervical spine obtained and no evidence for transverse ligamentous injury or malalignment, there is a C1 posterior arch fracture, left occipital condylar fracture and cervical spondylosis. No surgical intervention warranted, recommend cervical collar for 8 to 12 weeks.  Patient will follow-up with [place] [facility] in 4 to 6 weeks for reevaluation.    Also noted on the MRCP was a suspicious left mid renal mass that demonstrates cystic and solid components. A nonemergent outpatient renal mass MRI is recommended. This should not delay surgery. Urology and PCP should be made aware of this lesion. Discussed with wife and patient.    Orthopedic surgery consulted for radial fracture and right periprosthetic femur fracture. Nonoperative management for wrist fracture, splint placed in ED. CT of the right hip and femur obtained and again revealed an acute right proximal femur periprosthetic fracture, no additional fractures identified. Also noted was a right hip intramuscular hematoma. Patient was taken to OR on 01/17 and had PERIPROSTHETIC RIGHT HIP OPEN REDUCTION INTERNAL FIXATION.       Today he is seen to review vital signs, a routine visit and follow up post ER visit on 2/9/23.  He was seen at the bedside with his wife present.  He is with C-spine fracture wearing a collar continuously and also with a right radius fracture non operative and in a cast. and right femur ORIF which is healed and he is wearing a lidocaine patch on his hip.  Vital signs are stable.  He was sent to the ER on 2/9 due to delirium and it was thought to be related to multiple pain medications. His Norco was Dc'd, gabapentin reduced, Cymbalta dc'd and Atorvastatin dc'd to to elevated LFT's. He does report bowel movements are not regular however he did have a BM yesterday after a suppository was given. Senna S changed to scheduled BID. Today his gabapentin was also increased to 300 mg at HS, per notes OK to taper up to 300 mg BID as tolerated.    ALLERGIES:Amitriptyline hcl, Bees, and Lamisil    PAST MEDICAL HISTORY:   Past Medical History:   Diagnosis Date     Arthritis     back, (L) hip, (L) knee     Dermatitis scalp     Foraminal stenosis of lumbar region      Hypertension      Left foot drop      Left renal mass      with resulting hydronephrosis     Neuropathic pain, leg     left     Numbness and tingling     drop foot (L) side with numbness     Prostate cancer (H)      Prostate cancer (H)      Thalassemia minor      Vitamin D deficiency      Vitamin D deficiency disease        PAST SURGICAL HISTORY:   has a past surgical history that includes back surgery; Cholecystectomy; Laparoscopic prostatectomy; Laminectomy lumbar posterior microscopic one level (12/19/2012); Arthroplasty hip (1/25/2012); GI surgery; and Arthroplasty Hip Anterior (Right, 8/20/2019).    FAMILY HISTORY: family history is not on file.    SOCIAL HISTORY:  reports that he has never smoked. He has never used smokeless tobacco. He reports current alcohol use. He reports that he does not use drugs.    ROS:  Constitutional: Negative for activity change, appetite change, fatigue and fever.   HENT: Negative for congestion.    Respiratory: Negative for cough, shortness of breath and wheezing.    Cardiovascular: Negative for chest pain and leg swelling.   Gastrointestinal: Negative for abdominal distention, abdominal pain, constipation, diarrhea and nausea.   Genitourinary: Negative for dysuria.   Musculoskeletal: Negative for arthralgia. Negative for back pain.  Cast right wrist, right hip incision healed.   Skin: Negative for color change and wound.   Neurological: Negative for dizziness.  Cervical collar at all times psychiatric/Behavioral: Negative for agitation, behavioral problems and confusion. No longer hallucinating since pain medications stopped and adjusted     Physical Exam:  Constitutional:       Appearance: Patient is well-developed.   HENT:      Head: Normocephalic.   Eyes:      Conjunctiva/sclera: Conjunctivae normal.   Neck:      Musculoskeletal: Normal range of motion.   Cardiovascular:      Rate and Rhythm: Normal rate and regular rhythm.      Heart sounds: Normal heart sounds. No murmur.   Pulmonary:      Effort: No respiratory distress.       "Breath sounds: Normal breath sounds. No wheezing or rales.   Abdominal:      General: Bowel sounds are normal. There is no distension.      Palpations: Abdomen is soft.      Tenderness: There is no abdominal tenderness.   Musculoskeletal:       Normal range of motion.   healed right hip incision   Skin:General:        Skin is warm.   Neurological:         Mental Status: Patient is alert and oriented to person, place, and time.    Psychiatric:         Behavior: Behavior normal.     Vitals:/72   Pulse 68   Temp 97.1  F (36.2  C)   Resp 18   Ht 1.727 m (5' 8\")   Wt 62.1 kg (137 lb)   SpO2 96%   BMI 20.83 kg/m   and Body mass index is 20.83 kg/m .    Lab/Diagnostic data:   Recent Results (from the past 240 hour(s))   Basic metabolic panel    Collection Time: 02/08/23  5:05 AM   Result Value Ref Range    Sodium 134 (L) 136 - 145 mmol/L    Potassium 3.4 3.4 - 5.3 mmol/L    Chloride 93 (L) 98 - 107 mmol/L    Carbon Dioxide (CO2) 30 (H) 22 - 29 mmol/L    Anion Gap 11 7 - 15 mmol/L    Urea Nitrogen 15.7 8.0 - 23.0 mg/dL    Creatinine 0.89 0.67 - 1.17 mg/dL    Calcium 8.7 (L) 8.8 - 10.2 mg/dL    Glucose 90 70 - 99 mg/dL    GFR Estimate 82 >60 mL/min/1.73m2     MEDICATIONS:     Review of your medicines          Accurate as of February 14, 2023  1:33 PM. If you have any questions, ask your nurse or doctor.            CONTINUE these medicines which may have CHANGED, or have new prescriptions. If we are uncertain of the size of tablets/capsules you have at home, strength may be listed as something that might have changed.      Dose / Directions   gabapentin 100 MG capsule  Commonly known as: NEURONTIN  This may have changed: Another medication with the same name was removed. Continue taking this medication, and follow the directions you see here.  Changed by: Joy Soares CNP      Dose: 300 mg  Take 300 mg by mouth At Bedtime  Refills: 0        CONTINUE these medicines which have NOT CHANGED      Dose / Directions "   acetaminophen 325 MG tablet  Commonly known as: TYLENOL      Dose: 650 mg  Take 650 mg by mouth every 4 hours as needed for mild pain  Refills: 0     aspirin 81 MG chewable tablet  Commonly known as: ASA      Dose: 81 mg  Take 81 mg by mouth daily  Refills: 0     bisacodyl 10 MG suppository  Commonly known as: DULCOLAX      Dose: 10 mg  Place 10 mg rectally daily as needed for constipation  Refills: 0     Ferrous Gluconate 239 (27 Fe) MG Tabs      Dose: 1 tablet  Take 1 tablet by mouth daily  Refills: 0     Lidocaine 4 % Patch  Commonly known as: LIDOCARE      Dose: 1 patch  Place 1 patch onto the skin every 24 hours To prevent lidocaine toxicity, patient should be patch free for 12 hrs daily.  Refills: 0     melatonin 3 MG tablet      Dose: 3 mg  Take 3 mg by mouth nightly as needed for sleep  Refills: 0     metoprolol succinate ER 25 MG 24 hr tablet  Commonly known as: TOPROL XL      Dose: 25 mg  Take 25 mg by mouth daily  Refills: 0     polyethylene glycol 17 g packet  Commonly known as: MIRALAX      Dose: 1 packet  Take 1 packet by mouth daily as needed for constipation  Refills: 0     senna-docusate 8.6-50 MG tablet  Commonly known as: SENOKOT-S/PERICOLACE      Dose: 1 tablet  Take 1 tablet by mouth 2 times daily  Refills: 0     vitamin D3 1000 units (25 mcg) tablet  Commonly known as: CHOLECALCIFEROL  Used for: Status post THR (total hip replacement)      Dose: 1,000 Units  Take 1 tablet by mouth daily.  Quantity: 100 tablet  Refills: 0        STOP taking    amLODIPine 5 MG tablet  Commonly known as: NORVASC  Stopped by: Joy Soares CNP        atorvastatin 40 MG tablet  Commonly known as: LIPITOR  Stopped by: Joy Soares CNP        DULoxetine HCl 60 MG Csdr  Stopped by: Joy Soares CNP        HYDROcodone-acetaminophen 5-325 MG tablet  Commonly known as: NORCO  Stopped by: Joy Soares CNP        Iron 28 MG Tabs  Stopped by: Joy Soares CNP               ASSESSMENT/PLAN  Encounter Diagnoses   Name  Primary?     Closed nondisplaced fracture of second cervical vertebra with routine healing, unspecified fracture morphology, subsequent encounter Yes     Physical deconditioning      Pain management      Closed fracture of distal end of right radius with routine healing, unspecified fracture morphology, subsequent encounter      Closed fracture of second cervical vertebrae follow-up with orthopedics, pain control    Hyponatremia monitor labs last sodium 132    Physical deconditioning PT OT    Hypertension on Norvasc, metoprolol succinate    HDL Atorvastatin was dc'd in the hospital  due to elevated LFTS    Pain management Cymbalta dc'd in the hospital and gabapentin decreased, norco dc'd and lidocaine patches added       Electronically signed by: Joy Soares, CNP

## 2023-02-14 NOTE — LETTER
2/14/2023        RE: Matthew Branch  1957 \[facility]"" [id number]         HEALTH GERIATRIC SERVICES  Chief Complaint   Patient presents with     MARIYA     [facility] Medical Record Number:  [id number]  Place of Service where encounter took place:  [facility] ([facility]) [[id number]]  Code Status:  Full Code     HISTORY:      HPI:  Matthew Branch  is 89 year old (2/25/1933) undergoing physical and occupational therapy.  He is with a history of HTN, renal mass , Arthritis, who admitted 1/1/2023 after presenting to the ED for evaluation of fall with hip and wrist pain, found to have right periprosthetic hip fracture and distal radius fracture.  Excerpted from records   In the ED he was hypertensive but stable. Labs notable for creatinine 1.15, WBC 15.9, hemoglobin 12.6, INR 1.1, troponin 0.074. CXR showed cardiomegaly with coarse interstitial opacities but no infiltrate. XR showed R periprosthetic hip fracture, and R distal radius fracture (formal read still pending). Ortho consulted. CT cervical spine showed numerous cervical spine fractures (formal read pending). CT C/A/P with severe left hydronephrosis. CT thoracic and lumbar spine showed age-indeterminate T9 superior endplate compression fracture (30 to 40% height loss, no significant retropulsion). Trauma surgery evaluated patient and notes multiple fractures including cervical spine fracture, odontoid fracture, C2 fracture and right distal radius fracture.    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 Patient is asymptomatic and kidney function is normal, urology indicates no indication for intervention. Urology recommends observation at this time and follow-up in 6 to 8 weeks for a Lasix renogram.    [facility] consulted and MRI cervical spine obtained and no evidence for transverse ligamentous injury or malalignment, there is a C1 posterior arch fracture, left occipital condylar fracture and cervical  spondylosis. No surgical intervention warranted, recommend cervical collar for 8 to 12 weeks. Patient will follow-up with [facility] in 4 to 6 weeks for reevaluation.    Also noted on the MRCP was a suspicious left mid renal mass that demonstrates cystic and solid components. A nonemergent outpatient renal mass MRI is recommended. This should not delay surgery. Urology and PCP should be made aware of this lesion. Discussed with wife and patient.    Orthopedic surgery consulted for radial fracture and right periprosthetic femur fracture. Nonoperative management for wrist fracture, splint placed in ED. CT of the right hip and femur obtained and again revealed an acute right proximal femur periprosthetic fracture, no additional fractures identified. Also noted was a right hip intramuscular hematoma. Patient was taken to OR on 01/17 and had PERIPROSTHETIC RIGHT HIP OPEN REDUCTION INTERNAL FIXATION.       Today he is seen to review vital signs, a routine visit and follow up post ER visit on 2/9/23.  He was seen at the bedside with his wife present.  He is with C-spine fracture wearing a collar continuously and also with a right radius fracture non operative and in a cast. and right femur ORIF which is healed and he is wearing a lidocaine patch on his hip.  Vital signs are stable.  He was sent to the ER on 2/9 due to delirium and it was thought to be related to multiple pain medications. His Norco was Dc'd, gabapentin reduced, Cymbalta dc'd and Atorvastatin dc'd to to elevated LFT's. He does report bowel movements are not regular however he did have a BM yesterday after a suppository was given. Senna S changed to scheduled BID. Today his gabapentin was also increased to 300 mg at HS, per notes OK to taper up to 300 mg BID as tolerated.    ALLERGIES:Amitriptyline hcl, Bees, and Lamisil    PAST MEDICAL HISTORY:   Past Medical History:   Diagnosis Date     Arthritis     back, (L) hip, (L) knee     Dermatitis scalp      Foraminal stenosis of lumbar region      Hypertension      Left foot drop      Left renal mass     with resulting hydronephrosis     Neuropathic pain, leg     left     Numbness and tingling     drop foot (L) side with numbness     Prostate cancer (H)      Prostate cancer (H)      Thalassemia minor      Vitamin D deficiency      Vitamin D deficiency disease        PAST SURGICAL HISTORY:   has a past surgical history that includes back surgery; Cholecystectomy; Laparoscopic prostatectomy; Laminectomy lumbar posterior microscopic one level (12/19/2012); Arthroplasty hip (1/25/2012); GI surgery; and Arthroplasty Hip Anterior (Right, 8/20/2019).    FAMILY HISTORY: family history is not on file.    SOCIAL HISTORY:  reports that he has never smoked. He has never used smokeless tobacco. He reports current alcohol use. He reports that he does not use drugs.    ROS:  Constitutional: Negative for activity change, appetite change, fatigue and fever.   HENT: Negative for congestion.    Respiratory: Negative for cough, shortness of breath and wheezing.    Cardiovascular: Negative for chest pain and leg swelling.   Gastrointestinal: Negative for abdominal distention, abdominal pain, constipation, diarrhea and nausea.   Genitourinary: Negative for dysuria.   Musculoskeletal: Negative for arthralgia. Negative for back pain.  Cast right wrist, right hip incision healed.   Skin: Negative for color change and wound.   Neurological: Negative for dizziness.  Cervical collar at all times psychiatric/Behavioral: Negative for agitation, behavioral problems and confusion. No longer hallucinating since pain medications stopped and adjusted     Physical Exam:  Constitutional:       Appearance: Patient is well-developed.   HENT:      Head: Normocephalic.   Eyes:      Conjunctiva/sclera: Conjunctivae normal.   Neck:      Musculoskeletal: Normal range of motion.   Cardiovascular:      Rate and Rhythm: Normal rate and regular rhythm.      Heart  "sounds: Normal heart sounds. No murmur.   Pulmonary:      Effort: No respiratory distress.      Breath sounds: Normal breath sounds. No wheezing or rales.   Abdominal:      General: Bowel sounds are normal. There is no distension.      Palpations: Abdomen is soft.      Tenderness: There is no abdominal tenderness.   Musculoskeletal:       Normal range of motion.   healed right hip incision   Skin:General:        Skin is warm.   Neurological:         Mental Status: Patient is alert and oriented to person, place, and time.    Psychiatric:         Behavior: Behavior normal.     Vitals:/72   Pulse 68   Temp 97.1  F (36.2  C)   Resp 18   Ht 1.727 m (5' 8\")   Wt 62.1 kg (137 lb)   SpO2 96%   BMI 20.83 kg/m   and Body mass index is 20.83 kg/m .    Lab/Diagnostic data:   Recent Results (from the past 240 hour(s))   Basic metabolic panel    Collection Time: 02/08/23  5:05 AM   Result Value Ref Range    Sodium 134 (L) 136 - 145 mmol/L    Potassium 3.4 3.4 - 5.3 mmol/L    Chloride 93 (L) 98 - 107 mmol/L    Carbon Dioxide (CO2) 30 (H) 22 - 29 mmol/L    Anion Gap 11 7 - 15 mmol/L    Urea Nitrogen 15.7 8.0 - 23.0 mg/dL    Creatinine 0.89 0.67 - 1.17 mg/dL    Calcium 8.7 (L) 8.8 - 10.2 mg/dL    Glucose 90 70 - 99 mg/dL    GFR Estimate 82 >60 mL/min/1.73m2     MEDICATIONS:     Review of your medicines          Accurate as of February 14, 2023  1:33 PM. If you have any questions, ask your nurse or doctor.            CONTINUE these medicines which may have CHANGED, or have new prescriptions. If we are uncertain of the size of tablets/capsules you have at home, strength may be listed as something that might have changed.      Dose / Directions   gabapentin 100 MG capsule  Commonly known as: NEURONTIN  This may have changed: Another medication with the same name was removed. Continue taking this medication, and follow the directions you see here.  Changed by: Joy Soares CNP      Dose: 300 mg  Take 300 mg by mouth At " Bedtime  Refills: 0        CONTINUE these medicines which have NOT CHANGED      Dose / Directions   acetaminophen 325 MG tablet  Commonly known as: TYLENOL      Dose: 650 mg  Take 650 mg by mouth every 4 hours as needed for mild pain  Refills: 0     aspirin 81 MG chewable tablet  Commonly known as: ASA      Dose: 81 mg  Take 81 mg by mouth daily  Refills: 0     bisacodyl 10 MG suppository  Commonly known as: DULCOLAX      Dose: 10 mg  Place 10 mg rectally daily as needed for constipation  Refills: 0     Ferrous Gluconate 239 (27 Fe) MG Tabs      Dose: 1 tablet  Take 1 tablet by mouth daily  Refills: 0     Lidocaine 4 % Patch  Commonly known as: LIDOCARE      Dose: 1 patch  Place 1 patch onto the skin every 24 hours To prevent lidocaine toxicity, patient should be patch free for 12 hrs daily.  Refills: 0     melatonin 3 MG tablet      Dose: 3 mg  Take 3 mg by mouth nightly as needed for sleep  Refills: 0     metoprolol succinate ER 25 MG 24 hr tablet  Commonly known as: TOPROL XL      Dose: 25 mg  Take 25 mg by mouth daily  Refills: 0     polyethylene glycol 17 g packet  Commonly known as: MIRALAX      Dose: 1 packet  Take 1 packet by mouth daily as needed for constipation  Refills: 0     senna-docusate 8.6-50 MG tablet  Commonly known as: SENOKOT-S/PERICOLACE      Dose: 1 tablet  Take 1 tablet by mouth 2 times daily  Refills: 0     vitamin D3 1000 units (25 mcg) tablet  Commonly known as: CHOLECALCIFEROL  Used for: Status post THR (total hip replacement)      Dose: 1,000 Units  Take 1 tablet by mouth daily.  Quantity: 100 tablet  Refills: 0        STOP taking    amLODIPine 5 MG tablet  Commonly known as: NORVASC  Stopped by: Joy Soares CNP        atorvastatin 40 MG tablet  Commonly known as: LIPITOR  Stopped by: Joy Soares CNP        DULoxetine HCl 60 MG Csdr  Stopped by: Joy Soares CNP        HYDROcodone-acetaminophen 5-325 MG tablet  Commonly known as: NORCO  Stopped by: Joy Soares CNP        Iron 28  MG Tabs  Stopped by: Joy Soares CNP               ASSESSMENT/PLAN  Encounter Diagnoses   Name Primary?     Closed nondisplaced fracture of second cervical vertebra with routine healing, unspecified fracture morphology, subsequent encounter Yes     Physical deconditioning      Pain management      Closed fracture of distal end of right radius with routine healing, unspecified fracture morphology, subsequent encounter      Closed fracture of second cervical vertebrae follow-up with orthopedics, pain control    Hyponatremia monitor labs last sodium 132    Physical deconditioning PT OT    Hypertension on Norvasc, metoprolol succinate    HDL Atorvastatin was dc'd in the hospital  due to elevated LFTS    Pain management Cymbalta dc'd in the hospital and gabapentin decreased, norco dc'd and lidocaine patches added       Electronically signed by: Joy Soares CNP        Sincerely,        Joy Soares CNP

## 2023-02-16 NOTE — LETTER
2/16/2023        RE: Matthew Branch  1957 [facility] [address]         HEALTH GERIATRIC SERVICES  Chief Complaint   Patient presents with     MARIYA     [facility] Medical Record Number:  [id number]  Place of Service where encounter took place:  [facility] ([facility]) [[id number]]  Code Status:  Full Code     HISTORY:      HPI:  Matthew Branch  is 89 year old (2/25/1933) undergoing physical and occupational therapy.  He is with a history of HTN, renal mass , Arthritis, who admitted 1/1/2023 after presenting to the ED for evaluation of fall with hip and wrist pain, found to have right periprosthetic hip fracture and distal radius fracture.  Excerpted from records   In the ED he was hypertensive but stable. Labs notable for creatinine 1.15, WBC 15.9, hemoglobin 12.6, INR 1.1, troponin 0.074. CXR showed cardiomegaly with coarse interstitial opacities but no infiltrate. XR showed R periprosthetic hip fracture, and R distal radius fracture (formal read still pending). Ortho consulted. CT cervical spine showed numerous cervical spine fractures (formal read pending). CT C/A/P with severe left hydronephrosis. CT thoracic and lumbar spine showed age-indeterminate T9 superior endplate compression fracture (30 to 40% height loss, no significant retropulsion). Trauma surgery evaluated patient and notes multiple fractures including cervical spine fracture, odontoid fracture, C2 fracture and right distal radius fracture.    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 Patient is asymptomatic and kidney function is normal, urology indicates no indication for intervention. Urology recommends observation at this time and follow-up in 6 to 8 weeks for a Lasix renogram.    [facility] consulted and MRI cervical spine obtained and no evidence for transverse ligamentous injury or malalignment, there is a C1 posterior arch fracture, left occipital condylar fracture and cervical  spondylosis. No surgical intervention warranted, recommend cervical collar for 8 to 12 weeks. Patient will follow-up with [facility] in 4 to 6 weeks for reevaluation.    Also noted on the MRCP was a suspicious left mid renal mass that demonstrates cystic and solid components. A nonemergent outpatient renal mass MRI is recommended. This should not delay surgery. Urology and PCP should be made aware of this lesion. Discussed with wife and patient.    Orthopedic surgery consulted for radial fracture and right periprosthetic femur fracture. Nonoperative management for wrist fracture, splint placed in ED. CT of the right hip and femur obtained and again revealed an acute right proximal femur periprosthetic fracture, no additional fractures identified. Also noted was a right hip intramuscular hematoma. Patient was taken to OR on 01/17 and had PERIPROSTHETIC RIGHT HIP OPEN REDUCTION INTERNAL FIXATION.       Today he is seen to review vital signs, a routine visit and follow up constipation.  She reported no BM for 3 days.  He denied abdominal discomfort.  Passing flatus.  He was given a suppository this morning and his senna S was increased to 2 tablets twice daily and to hold for loose stools.  He is tolerating the increased dose of gabapentin 300 mg at at bedtime. .  He is with C-spine fracture wearing a collar continuously and also with a right radius fracture non operative and in a cast. and right femur ORIF which is healed and he is wearing a lidocaine patch on his hip.  Vital signs are stable.      ALLERGIES:Amitriptyline hcl, Bees, and Lamisil    PAST MEDICAL HISTORY:   Past Medical History:   Diagnosis Date     Arthritis     back, (L) hip, (L) knee     Dermatitis scalp     Foraminal stenosis of lumbar region      Hypertension      Left foot drop      Left renal mass     with resulting hydronephrosis     Neuropathic pain, leg     left     Numbness and tingling     drop foot (L) side with numbness     Prostate  cancer (H)      Prostate cancer (H)      Thalassemia minor      Vitamin D deficiency      Vitamin D deficiency disease        PAST SURGICAL HISTORY:   has a past surgical history that includes back surgery; Cholecystectomy; Laparoscopic prostatectomy; Laminectomy lumbar posterior microscopic one level (12/19/2012); Arthroplasty hip (1/25/2012); GI surgery; and Arthroplasty Hip Anterior (Right, 8/20/2019).    FAMILY HISTORY: family history is not on file.    SOCIAL HISTORY:  reports that he has never smoked. He has never used smokeless tobacco. He reports current alcohol use. He reports that he does not use drugs.    ROS:  Constitutional: Negative for activity change, appetite change, fatigue and fever.   HENT: Negative for congestion.    Respiratory: Negative for cough, shortness of breath and wheezing.    Cardiovascular: Negative for chest pain and leg swelling.   Gastrointestinal: Negative for abdominal distention, abdominal pain, constipation, diarrhea and nausea.   Genitourinary: Negative for dysuria.   Musculoskeletal: Negative for arthralgia. Negative for back pain.  Cast right wrist, right hip incision healed.   Skin: Negative for color change and wound.   Neurological: Negative for dizziness.  Cervical collar at all times psychiatric/Behavioral: Negative for agitation, behavioral problems and confusion. No longer hallucinating since pain medications stopped and adjusted     Physical Exam:  Constitutional:       Appearance: Patient is well-developed.   HENT:      Head: Normocephalic.   Eyes:      Conjunctiva/sclera: Conjunctivae normal.   Neck:      Musculoskeletal: Normal range of motion.   Cardiovascular:      Rate and Rhythm: Normal rate and regular rhythm.      Heart sounds: Normal heart sounds. No murmur.   Pulmonary:      Effort: No respiratory distress.      Breath sounds: Normal breath sounds. No wheezing or rales.   Abdominal:      General: Bowel sounds are normal. There is no distension.       "Palpations: Abdomen is soft.      Tenderness: There is no abdominal tenderness.   Musculoskeletal:       Normal range of motion.   healed right hip incision   Skin:General:        Skin is warm.   Neurological:         Mental Status: Patient is alert and oriented to person, place, and time.    Psychiatric:         Behavior: Behavior normal.     Vitals:/74   Pulse 98   Temp 97.8  F (36.6  C)   Resp 18   Ht 1.727 m (5' 8\")   Wt 62.1 kg (137 lb)   SpO2 96%   BMI 20.83 kg/m   and Body mass index is 20.83 kg/m .    Lab/Diagnostic data:   Recent Results (from the past 240 hour(s))   Basic metabolic panel    Collection Time: 02/08/23  5:05 AM   Result Value Ref Range    Sodium 134 (L) 136 - 145 mmol/L    Potassium 3.4 3.4 - 5.3 mmol/L    Chloride 93 (L) 98 - 107 mmol/L    Carbon Dioxide (CO2) 30 (H) 22 - 29 mmol/L    Anion Gap 11 7 - 15 mmol/L    Urea Nitrogen 15.7 8.0 - 23.0 mg/dL    Creatinine 0.89 0.67 - 1.17 mg/dL    Calcium 8.7 (L) 8.8 - 10.2 mg/dL    Glucose 90 70 - 99 mg/dL    GFR Estimate 82 >60 mL/min/1.73m2     MEDICATIONS:     Review of your medicines          Accurate as of February 16, 2023 11:59 PM. If you have any questions, ask your nurse or doctor.            CONTINUE these medicines which have NOT CHANGED      Dose / Directions   acetaminophen 325 MG tablet  Commonly known as: TYLENOL      Dose: 650 mg  Take 650 mg by mouth every 4 hours as needed for mild pain  Refills: 0     aspirin 81 MG chewable tablet  Commonly known as: ASA      Dose: 81 mg  Take 81 mg by mouth daily  Refills: 0     bisacodyl 10 MG suppository  Commonly known as: DULCOLAX      Dose: 10 mg  Place 10 mg rectally daily as needed for constipation  Refills: 0     Ferrous Gluconate 239 (27 Fe) MG Tabs      Dose: 1 tablet  Take 1 tablet by mouth daily  Refills: 0     gabapentin 100 MG capsule  Commonly known as: NEURONTIN      Dose: 300 mg  Take 300 mg by mouth At Bedtime  Refills: 0     Lidocaine 4 % Patch  Commonly known as: " LIDOCARE      Dose: 1 patch  Place 1 patch onto the skin every 24 hours To prevent lidocaine toxicity, patient should be patch free for 12 hrs daily.  Refills: 0     melatonin 3 MG tablet      Dose: 3 mg  Take 3 mg by mouth nightly as needed for sleep  Refills: 0     metoprolol succinate ER 25 MG 24 hr tablet  Commonly known as: TOPROL XL      Dose: 25 mg  Take 25 mg by mouth daily  Refills: 0     polyethylene glycol 17 g packet  Commonly known as: MIRALAX      Dose: 1 packet  Take 1 packet by mouth daily as needed for constipation  Refills: 0     senna-docusate 8.6-50 MG tablet  Commonly known as: SENOKOT-S/PERICOLACE      Dose: 2 tablet  Take 2 tablets by mouth 2 times daily  Refills: 0     vitamin D3 1000 units (25 mcg) tablet  Commonly known as: CHOLECALCIFEROL  Used for: Status post THR (total hip replacement)      Dose: 1,000 Units  Take 1 tablet by mouth daily.  Quantity: 100 tablet  Refills: 0            ASSESSMENT/PLAN  Encounter Diagnoses   Name Primary?     Physical deconditioning Yes     Pain management      Closed nondisplaced fracture of second cervical vertebra with routine healing, unspecified fracture morphology, subsequent encounter      Constipation, unspecified constipation type      Closed fracture of second cervical vertebrae follow-up with orthopedics, pain control    Hyponatremia monitor labs last sodium 132    Physical deconditioning PT OT    Hypertension on Norvasc, metoprolol succinate    HDL Atorvastatin was dc'd in the hospital  due to elevated LFTS    Pain management Cymbalta dc'd in the hospital and gabapentin decreased, norco dc'd and lidocaine patches added     Constipation given a suppository this morning increase senna S to 2 tablets twice daily hold for loose stools MiraLAX as needed      Electronically signed by: Joy Soares CNP        Sincerely,        Joy Soares CNP

## 2023-02-16 NOTE — PROGRESS NOTES
[facility] GERIATRIC SERVICES  Chief Complaint   Patient presents with     MARIYA     [facility] Record Number:  [id number]  Place of Service where encounter took place:  [facility] ([facility]) [[id number]]  Code Status:  Full Code     HISTORY:      HPI:  Matthew Branch  is 89 year old (2/25/1933) undergoing physical and occupational therapy.  He is with a history of HTN, renal mass , Arthritis, who admitted 1/1/2023 after presenting to the ED for evaluation of fall with hip and wrist pain, found to have right periprosthetic hip fracture and distal radius fracture.  Excerpted from records   In the ED he was hypertensive but stable. Labs notable for creatinine 1.15, WBC 15.9, hemoglobin 12.6, INR 1.1, troponin 0.074. CXR showed cardiomegaly with coarse interstitial opacities but no infiltrate. XR showed R periprosthetic hip fracture, and R distal radius fracture (formal read still pending). Ortho consulted. CT cervical spine showed numerous cervical spine fractures (formal read pending). CT C/A/P with severe left hydronephrosis. CT thoracic and lumbar spine showed age-indeterminate T9 superior endplate compression fracture (30 to 40% height loss, no significant retropulsion). Trauma surgery evaluated patient and notes multiple fractures including cervical spine fracture, odontoid fracture, C2 fracture and right distal radius fracture.    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 Patient is asymptomatic and kidney function is normal, urology indicates no indication for intervention. Urology recommends observation at this time and follow-up in 6 to 8 weeks for a Lasix renogram.    [place] [facility] consulted and MRI cervical spine obtained and no evidence for transverse ligamentous injury or malalignment, there is a C1 posterior arch fracture, left occipital condylar fracture and cervical spondylosis. No surgical intervention warranted, recommend cervical collar for 8 to 12 weeks.  Patient will follow-up with [facility] in 4 to 6 weeks for reevaluation.    Also noted on the MRCP was a suspicious left mid renal mass that demonstrates cystic and solid components. A nonemergent outpatient renal mass MRI is recommended. This should not delay surgery. Urology and PCP should be made aware of this lesion. Discussed with wife and patient.    Orthopedic surgery consulted for radial fracture and right periprosthetic femur fracture. Nonoperative management for wrist fracture, splint placed in ED. CT of the right hip and femur obtained and again revealed an acute right proximal femur periprosthetic fracture, no additional fractures identified. Also noted was a right hip intramuscular hematoma. Patient was taken to OR on 01/17 and had PERIPROSTHETIC RIGHT HIP OPEN REDUCTION INTERNAL FIXATION.       Today he is seen to review vital signs, a routine visit and follow up constipation.  She reported no BM for 3 days.  He denied abdominal discomfort.  Passing flatus.  He was given a suppository this morning and his senna S was increased to 2 tablets twice daily and to hold for loose stools.  He is tolerating the increased dose of gabapentin 300 mg at at bedtime. .  He is with C-spine fracture wearing a collar continuously and also with a right radius fracture non operative and in a cast. and right femur ORIF which is healed and he is wearing a lidocaine patch on his hip.  Vital signs are stable.      ALLERGIES:Amitriptyline hcl, Bees, and Lamisil    PAST MEDICAL HISTORY:   Past Medical History:   Diagnosis Date     Arthritis     back, (L) hip, (L) knee     Dermatitis scalp     Foraminal stenosis of lumbar region      Hypertension      Left foot drop      Left renal mass     with resulting hydronephrosis     Neuropathic pain, leg     left     Numbness and tingling     drop foot (L) side with numbness     Prostate cancer (H)      Prostate cancer (H)      Thalassemia minor      Vitamin D deficiency      Vitamin  D deficiency disease        PAST SURGICAL HISTORY:   has a past surgical history that includes back surgery; Cholecystectomy; Laparoscopic prostatectomy; Laminectomy lumbar posterior microscopic one level (12/19/2012); Arthroplasty hip (1/25/2012); GI surgery; and Arthroplasty Hip Anterior (Right, 8/20/2019).    FAMILY HISTORY: family history is not on file.    SOCIAL HISTORY:  reports that he has never smoked. He has never used smokeless tobacco. He reports current alcohol use. He reports that he does not use drugs.    ROS:  Constitutional: Negative for activity change, appetite change, fatigue and fever.   HENT: Negative for congestion.    Respiratory: Negative for cough, shortness of breath and wheezing.    Cardiovascular: Negative for chest pain and leg swelling.   Gastrointestinal: Negative for abdominal distention, abdominal pain, constipation, diarrhea and nausea.   Genitourinary: Negative for dysuria.   Musculoskeletal: Negative for arthralgia. Negative for back pain.  Cast right wrist, right hip incision healed.   Skin: Negative for color change and wound.   Neurological: Negative for dizziness.  Cervical collar at all times psychiatric/Behavioral: Negative for agitation, behavioral problems and confusion. No longer hallucinating since pain medications stopped and adjusted     Physical Exam:  Constitutional:       Appearance: Patient is well-developed.   HENT:      Head: Normocephalic.   Eyes:      Conjunctiva/sclera: Conjunctivae normal.   Neck:      Musculoskeletal: Normal range of motion.   Cardiovascular:      Rate and Rhythm: Normal rate and regular rhythm.      Heart sounds: Normal heart sounds. No murmur.   Pulmonary:      Effort: No respiratory distress.      Breath sounds: Normal breath sounds. No wheezing or rales.   Abdominal:      General: Bowel sounds are normal. There is no distension.      Palpations: Abdomen is soft.      Tenderness: There is no abdominal tenderness.   Musculoskeletal:        "Normal range of motion.   healed right hip incision   Skin:General:        Skin is warm.   Neurological:         Mental Status: Patient is alert and oriented to person, place, and time.    Psychiatric:         Behavior: Behavior normal.     Vitals:/74   Pulse 98   Temp 97.8  F (36.6  C)   Resp 18   Ht 1.727 m (5' 8\")   Wt 62.1 kg (137 lb)   SpO2 96%   BMI 20.83 kg/m   and Body mass index is 20.83 kg/m .    Lab/Diagnostic data:   Recent Results (from the past 240 hour(s))   Basic metabolic panel    Collection Time: 02/08/23  5:05 AM   Result Value Ref Range    Sodium 134 (L) 136 - 145 mmol/L    Potassium 3.4 3.4 - 5.3 mmol/L    Chloride 93 (L) 98 - 107 mmol/L    Carbon Dioxide (CO2) 30 (H) 22 - 29 mmol/L    Anion Gap 11 7 - 15 mmol/L    Urea Nitrogen 15.7 8.0 - 23.0 mg/dL    Creatinine 0.89 0.67 - 1.17 mg/dL    Calcium 8.7 (L) 8.8 - 10.2 mg/dL    Glucose 90 70 - 99 mg/dL    GFR Estimate 82 >60 mL/min/1.73m2     MEDICATIONS:     Review of your medicines          Accurate as of February 16, 2023 11:59 PM. If you have any questions, ask your nurse or doctor.            CONTINUE these medicines which have NOT CHANGED      Dose / Directions   acetaminophen 325 MG tablet  Commonly known as: TYLENOL      Dose: 650 mg  Take 650 mg by mouth every 4 hours as needed for mild pain  Refills: 0     aspirin 81 MG chewable tablet  Commonly known as: ASA      Dose: 81 mg  Take 81 mg by mouth daily  Refills: 0     bisacodyl 10 MG suppository  Commonly known as: DULCOLAX      Dose: 10 mg  Place 10 mg rectally daily as needed for constipation  Refills: 0     Ferrous Gluconate 239 (27 Fe) MG Tabs      Dose: 1 tablet  Take 1 tablet by mouth daily  Refills: 0     gabapentin 100 MG capsule  Commonly known as: NEURONTIN      Dose: 300 mg  Take 300 mg by mouth At Bedtime  Refills: 0     Lidocaine 4 % Patch  Commonly known as: LIDOCARE      Dose: 1 patch  Place 1 patch onto the skin every 24 hours To prevent lidocaine toxicity, " patient should be patch free for 12 hrs daily.  Refills: 0     melatonin 3 MG tablet      Dose: 3 mg  Take 3 mg by mouth nightly as needed for sleep  Refills: 0     metoprolol succinate ER 25 MG 24 hr tablet  Commonly known as: TOPROL XL      Dose: 25 mg  Take 25 mg by mouth daily  Refills: 0     polyethylene glycol 17 g packet  Commonly known as: MIRALAX      Dose: 1 packet  Take 1 packet by mouth daily as needed for constipation  Refills: 0     senna-docusate 8.6-50 MG tablet  Commonly known as: SENOKOT-S/PERICOLACE      Dose: 2 tablet  Take 2 tablets by mouth 2 times daily  Refills: 0     vitamin D3 1000 units (25 mcg) tablet  Commonly known as: CHOLECALCIFEROL  Used for: Status post THR (total hip replacement)      Dose: 1,000 Units  Take 1 tablet by mouth daily.  Quantity: 100 tablet  Refills: 0            ASSESSMENT/PLAN  Encounter Diagnoses   Name Primary?     Physical deconditioning Yes     Pain management      Closed nondisplaced fracture of second cervical vertebra with routine healing, unspecified fracture morphology, subsequent encounter      Constipation, unspecified constipation type      Closed fracture of second cervical vertebrae follow-up with orthopedics, pain control    Hyponatremia monitor labs last sodium 132    Physical deconditioning PT OT    Hypertension on Norvasc, metoprolol succinate    HDL Atorvastatin was dc'd in the hospital  due to elevated LFTS    Pain management Cymbalta dc'd in the hospital and gabapentin decreased, norco dc'd and lidocaine patches added     Constipation given a suppository this morning increase senna S to 2 tablets twice daily hold for loose stools MiraLAX as needed      Electronically signed by: Joy Soares CNP

## 2023-02-21 NOTE — PROGRESS NOTES
[facility] GERIATRIC SERVICES  Chief Complaint   Patient presents with     Discharge Summary [facility] Medical Record Number:  [id number]  Place of Service where encounter took place:  [facility] ([facility]) [[id number]]  Code Status:  Full Code     HISTORY:      HPI:  Matthew Branch  is 89 year old (2/25/1933) undergoing physical and occupational therapy.  He is with a history of HTN, renal mass , Arthritis, who admitted 1/1/2023 after presenting to the ED for evaluation of fall with hip and wrist pain, found to have right periprosthetic hip fracture and distal radius fracture.  Excerpted from records   In the ED he was hypertensive but stable. Labs notable for creatinine 1.15, WBC 15.9, hemoglobin 12.6, INR 1.1, troponin 0.074. CXR showed cardiomegaly with coarse interstitial opacities but no infiltrate. XR showed R periprosthetic hip fracture, and R distal radius fracture (formal read still pending). Ortho consulted. CT cervical spine showed numerous cervical spine fractures (formal read pending). CT C/A/P with severe left hydronephrosis. CT thoracic and lumbar spine showed age-indeterminate T9 superior endplate compression fracture (30 to 40% height loss, no significant retropulsion). Trauma surgery evaluated patient and notes multiple fractures including cervical spine fracture, odontoid fracture, C2 fracture and right distal radius fracture.    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 Patient is asymptomatic and kidney function is normal, urology indicates no indication for intervention. Urology recommends observation at this time and follow-up in 6 to 8 weeks for a Lasix renogram.    [facility] consulted and MRI cervical spine obtained and no evidence for transverse ligamentous injury or malalignment, there is a C1 posterior arch fracture, left occipital condylar fracture and cervical spondylosis. No surgical intervention warranted, recommend cervical collar  for 8 to 12 weeks. Patient will follow-up with [facility] in 4 to 6 weeks for reevaluation.    Also noted on the MRCP was a suspicious left mid renal mass that demonstrates cystic and solid components. A nonemergent outpatient renal mass MRI is recommended. This should not delay surgery. Urology and PCP should be made aware of this lesion. Discussed with wife and patient.    Orthopedic surgery consulted for radial fracture and right periprosthetic femur fracture. Nonoperative management for wrist fracture, splint placed in ED. CT of the right hip and femur obtained and again revealed an acute right proximal femur periprosthetic fracture, no additional fractures identified. Also noted was a right hip intramuscular hematoma. Patient was taken to OR on 01/17 and had PERIPROSTHETIC RIGHT HIP OPEN REDUCTION INTERNAL FIXATION.       Today he is seen to review vital signs, a routine visit and a face-to-face for discharge and for DME equipment .  He will discharge to home on 2/24/2023 with current medications and treatments.  He will have home care services PT OT home health aide.  Today he reports feeling constipated however his wife reports he had a bowel movement yesterday. His senna S was recently increased to 2 tablets twice daily and he requested to have his MiraLAX scheduled.   He denied Abdominla discomfort and he is passing flatus. His pain is controlled he is with a cast right wrist and able to move all fingers and denies any numbness or tingling.  He is tolerating the increased dose of gabapentin 300 mg at at bedtime. .  He is with C-spine fracture wearing a collar continuously and also with a right radius fracture non operative and in a cast. and right femur ORIF which is healed and he is wearing a lidocaine patch on his hip.  Vital signs are stable.      Equipment needs  Face-to-face for wheelchair and other DME equipment  Patient is an 89 year old with a dx of right distal radial fracture, C2 fracture, and  surgical repair of a right hip fracture.  patient has mobility limitations significantly impairing his ability to participate in mobility-related activities of daily living such as toileting, dressing, grooming, and bathing in customary locations in the home. Mobility limitation cannot be sufficiently resolved by us of an appropriately fitted cane or walker. Patient and/or caregiver is able to safely use a manual wheelchair. Patient s functional mobility deficit can be sufficiently resolved by the use of a manual wheelchair.  He will benefit from a 16 x 16 standard wheelchair with a seat cushion and foot rests.  The use of a seat cushion is needed because he sits in his wheelchair for greater than 8 hours a day placing him at high risk for skin breakdown.  This device is medically necessary for him  to complete her ADL s.  This equipment will be needed for patient's lifetime    He will also require a shower chair, grab bars, a bedside commode and bed rails for assistance with positioning.    ALLERGIES:Amitriptyline hcl, Bees, and Lamisil    PAST MEDICAL HISTORY:   Past Medical History:   Diagnosis Date     Arthritis     back, (L) hip, (L) knee     Dermatitis scalp     Foraminal stenosis of lumbar region      Hypertension      Left foot drop      Left renal mass     with resulting hydronephrosis     Neuropathic pain, leg     left     Numbness and tingling     drop foot (L) side with numbness     Prostate cancer (H)      Prostate cancer (H)      Thalassemia minor      Vitamin D deficiency      Vitamin D deficiency disease        PAST SURGICAL HISTORY:   has a past surgical history that includes back surgery; Cholecystectomy; Laparoscopic prostatectomy; Laminectomy lumbar posterior microscopic one level (12/19/2012); Arthroplasty hip (1/25/2012); GI surgery; and Arthroplasty Hip Anterior (Right, 8/20/2019).    FAMILY HISTORY: family history is not on file.    SOCIAL HISTORY:  reports that he has never smoked. He has  "never used smokeless tobacco. He reports current alcohol use. He reports that he does not use drugs.    ROS:  Constitutional: Negative for activity change, appetite change, fatigue and fever.   HENT: Negative for congestion.    Respiratory: Negative for cough, shortness of breath and wheezing.    Cardiovascular: Negative for chest pain and leg swelling.   Gastrointestinal: Negative for abdominal distention, abdominal pain, constipation, diarrhea and nausea.   Genitourinary: Negative for dysuria.   Musculoskeletal: Negative for arthralgia. Negative for back pain.  Cast right wrist, right hip incision healed.   Skin: Negative for color change and wound.   Neurological: Negative for dizziness.  Cervical collar at all times psychiatric/Behavioral: Negative for agitation, behavioral problems and confusion. No longer hallucinating since pain medications stopped and adjusted     Physical Exam:  Constitutional:       Appearance: Patient is well-developed.   HENT:      Head: Normocephalic.   Eyes:      Conjunctiva/sclera: Conjunctivae normal.   Neck:      Musculoskeletal: Normal range of motion.   Cardiovascular:      Rate and Rhythm: Normal rate and regular rhythm.      Heart sounds: Normal heart sounds. No murmur.   Pulmonary:      Effort: No respiratory distress.      Breath sounds: Normal breath sounds. No wheezing or rales.   Abdominal:      General: Bowel sounds are normal. There is no distension.      Palpations: Abdomen is soft.      Tenderness: There is no abdominal tenderness.   Musculoskeletal:       Normal range of motion.   healed right hip incision   Skin:General:        Skin is warm.   Neurological:         Mental Status: Patient is alert and oriented to person, place, and time.    Psychiatric:         Behavior: Behavior normal.     Vitals:BP (!) 129/90   Pulse 83   Temp 97.8  F (36.6  C)   Resp 18   Ht 1.727 m (5' 8\")   Wt 59.2 kg (130 lb 9.6 oz)   SpO2 95%   BMI 19.86 kg/m   and Body mass index is " 19.86 kg/m .    Lab/Diagnostic data:   Recent Results (from the past 240 hour(s))   Influenza A & B Antigen    Collection Time: 02/20/23 10:45 AM    Specimen: Nasopharyngeal; Swab   Result Value Ref Range    Influenza A antigen Negative Negative    Influenza B antigen Negative Negative   COVID-19 Virus (Coronavirus) by PCR Nasopharyngeal    Collection Time: 02/20/23 10:45 AM    Specimen: Nasopharyngeal; Swab   Result Value Ref Range    SARS CoV2 PCR Negative Negative     MEDICATIONS:     Review of your medicines          Accurate as of February 21, 2023 12:35 PM. If you have any questions, ask your nurse or doctor.            CONTINUE these medicines which have NOT CHANGED      Dose / Directions   acetaminophen 325 MG tablet  Commonly known as: TYLENOL      Dose: 650 mg  Take 650 mg by mouth every 4 hours as needed for mild pain  Refills: 0     aspirin 81 MG chewable tablet  Commonly known as: ASA      Dose: 81 mg  Take 81 mg by mouth daily  Refills: 0     bisacodyl 10 MG suppository  Commonly known as: DULCOLAX      Dose: 10 mg  Place 10 mg rectally daily as needed for constipation  Refills: 0     Ferrous Gluconate 239 (27 Fe) MG Tabs      Dose: 1 tablet  Take 1 tablet by mouth daily  Refills: 0     gabapentin 100 MG capsule  Commonly known as: NEURONTIN      Dose: 300 mg  Take 300 mg by mouth At Bedtime  Refills: 0     Lidocaine 4 % Patch  Commonly known as: LIDOCARE      Dose: 1 patch  Place 1 patch onto the skin every 24 hours To prevent lidocaine toxicity, patient should be patch free for 12 hrs daily.  Refills: 0     melatonin 3 MG tablet      Dose: 3 mg  Take 3 mg by mouth nightly as needed for sleep  Refills: 0     metoprolol succinate ER 25 MG 24 hr tablet  Commonly known as: TOPROL XL      Dose: 25 mg  Take 25 mg by mouth daily  Refills: 0     polyethylene glycol 17 g packet  Commonly known as: MIRALAX      Dose: 1 packet  Take 1 packet by mouth daily  Refills: 0     senna-docusate 8.6-50 MG  tablet  Commonly known as: SENOKOT-S/PERICOLACE      Dose: 2 tablet  Take 2 tablets by mouth 2 times daily  Refills: 0     vitamin D3 1000 units (25 mcg) tablet  Commonly known as: CHOLECALCIFEROL  Used for: Status post THR (total hip replacement)      Dose: 1,000 Units  Take 1 tablet by mouth daily.  Quantity: 100 tablet  Refills: 0            ASSESSMENT/PLAN  Encounter Diagnoses   Name Primary?     Physical deconditioning Yes     Pain management      Closed nondisplaced fracture of second cervical vertebra with routine healing, unspecified fracture morphology, subsequent encounter      Closed fracture of distal end of right radius with routine healing, unspecified fracture morphology, subsequent encounter      Closed fracture of second cervical vertebrae follow-up with orthopedics, pain control    Hyponatremia monitor labs last sodium 132    Physical deconditioning PT OT    Hypertension on Norvasc, metoprolol succinate    HDL Atorvastatin was dc'd in the hospital  due to elevated LFTS    Pain management Cymbalta dc'd in the hospital and gabapentin decreased, norco dc'd and lidocaine patches added     Constipation given a suppository this morning increase senna S to 2 tablets twice daily hold for loose stools MiraLAX as needed      DISCHARGE PLAN/FACE TO FACE:  I certify that services are/were furnished while this patient was under the care of a physician and that a physician or an allowed non-physician practitioner (NPP), had a face-to-face encounter that meets the physician face-to-face encounter requirements. The encounter was in whole, or in part, related to the primary reason for home health. The patient is confined to his/her home and needs intermittent skilled nursing, physical therapy, speech-language pathology, or the continued need for occupational therapy. A plan of care has been established by a physician and is periodically reviewed by a physician.  Date of Face-to-Face Encounter: 2/21/23    I certify  that, based on my findings, the following services are medically necessary home health services:PT/OT/HHA    My clinical findings support the need for the above skilled services because: PT OT for continued strength and endurance, home health aide to assist with activities of daily living    This patient is homebound because: He is deconditioned following recent cervical fracture, right wrist fracture and right hip fracture.  He continues with therapy    The patient is, or has been, under my care and I have initiated the establishment of the plan of care. This patient will be followed by a physician who will periodically review the plan of care.    Schedule follow up visit with primary care provider within 7 days to reestablish care.    Electronically signed by: Joy Soares CNP

## 2023-02-21 NOTE — LETTER
2/21/2023        RE: Matthew Branch  1957 [facility] [address]         HEALTH GERIATRIC SERVICES  Chief Complaint   Patient presents with     Discharge Summary Nursing [facility] Medical Record Number:  [id number]  Place of Service where encounter took place:  [facility] ([facility]) [[id number]]  Code Status:  Full Code     HISTORY:      HPI:  Matthew Branch  is 89 year old (2/25/1933) undergoing physical and occupational therapy.  He is with a history of HTN, renal mass , Arthritis, who admitted 1/1/2023 after presenting to the ED for evaluation of fall with hip and wrist pain, found to have right periprosthetic hip fracture and distal radius fracture.  Excerpted from records   In the ED he was hypertensive but stable. Labs notable for creatinine 1.15, WBC 15.9, hemoglobin 12.6, INR 1.1, troponin 0.074. CXR showed cardiomegaly with coarse interstitial opacities but no infiltrate. XR showed R periprosthetic hip fracture, and R distal radius fracture (formal read still pending). Ortho consulted. CT cervical spine showed numerous cervical spine fractures (formal read pending). CT C/A/P with severe left hydronephrosis. CT thoracic and lumbar spine showed age-indeterminate T9 superior endplate compression fracture (30 to 40% height loss, no significant retropulsion). Trauma surgery evaluated patient and notes multiple fractures including cervical spine fracture, odontoid fracture, C2 fracture and right distal radius fracture.    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 Patient is asymptomatic and kidney function is normal, urology indicates no indication for intervention. Urology recommends observation at this time and follow-up in 6 to 8 weeks for a Lasix renogram.    [facility] consulted and MRI cervical spine obtained and no evidence for transverse ligamentous injury or malalignment, there is a C1 posterior arch fracture, left occipital condylar  fracture and cervical spondylosis. No surgical intervention warranted, recommend cervical collar for 8 to 12 weeks. Patient will follow-up with [facility] in 4 to 6 weeks for reevaluation.    Also noted on the MRCP was a suspicious left mid renal mass that demonstrates cystic and solid components. A nonemergent outpatient renal mass MRI is recommended. This should not delay surgery. Urology and PCP should be made aware of this lesion. Discussed with wife and patient.    Orthopedic surgery consulted for radial fracture and right periprosthetic femur fracture. Nonoperative management for wrist fracture, splint placed in ED. CT of the right hip and femur obtained and again revealed an acute right proximal femur periprosthetic fracture, no additional fractures identified. Also noted was a right hip intramuscular hematoma. Patient was taken to OR on 01/17 and had PERIPROSTHETIC RIGHT HIP OPEN REDUCTION INTERNAL FIXATION.       Today he is seen to review vital signs, a routine visit and a face-to-face for discharge.  He will discharge to home on 2/23/2023 with current medications and treatments.  He will have home care services PT OT home health aide.  Today he reports feeling constipated however his wife reports he had a bowel movement yesterday. His senna S was recently increased to 2 tablets twice daily and he requested to have his MiraLAX scheduled.   He denied Abdominla discomfort and he is passing flatus. His pain is controlled he is with a cast right wrist and able to move all fingers and denies any numbness or tingling.  He is tolerating the increased dose of gabapentin 300 mg at at bedtime. .  He is with C-spine fracture wearing a collar continuously and also with a right radius fracture non operative and in a cast. and right femur ORIF which is healed and he is wearing a lidocaine patch on his hip.  Vital signs are stable.      ALLERGIES:Amitriptyline hcl, Bees, and Lamisil    PAST MEDICAL HISTORY:   Past  Medical History:   Diagnosis Date     Arthritis     back, (L) hip, (L) knee     Dermatitis scalp     Foraminal stenosis of lumbar region      Hypertension      Left foot drop      Left renal mass     with resulting hydronephrosis     Neuropathic pain, leg     left     Numbness and tingling     drop foot (L) side with numbness     Prostate cancer (H)      Prostate cancer (H)      Thalassemia minor      Vitamin D deficiency      Vitamin D deficiency disease        PAST SURGICAL HISTORY:   has a past surgical history that includes back surgery; Cholecystectomy; Laparoscopic prostatectomy; Laminectomy lumbar posterior microscopic one level (12/19/2012); Arthroplasty hip (1/25/2012); GI surgery; and Arthroplasty Hip Anterior (Right, 8/20/2019).    FAMILY HISTORY: family history is not on file.    SOCIAL HISTORY:  reports that he has never smoked. He has never used smokeless tobacco. He reports current alcohol use. He reports that he does not use drugs.    ROS:  Constitutional: Negative for activity change, appetite change, fatigue and fever.   HENT: Negative for congestion.    Respiratory: Negative for cough, shortness of breath and wheezing.    Cardiovascular: Negative for chest pain and leg swelling.   Gastrointestinal: Negative for abdominal distention, abdominal pain, constipation, diarrhea and nausea.   Genitourinary: Negative for dysuria.   Musculoskeletal: Negative for arthralgia. Negative for back pain.  Cast right wrist, right hip incision healed.   Skin: Negative for color change and wound.   Neurological: Negative for dizziness.  Cervical collar at all times psychiatric/Behavioral: Negative for agitation, behavioral problems and confusion. No longer hallucinating since pain medications stopped and adjusted     Physical Exam:  Constitutional:       Appearance: Patient is well-developed.   HENT:      Head: Normocephalic.   Eyes:      Conjunctiva/sclera: Conjunctivae normal.   Neck:      Musculoskeletal: Normal  "range of motion.   Cardiovascular:      Rate and Rhythm: Normal rate and regular rhythm.      Heart sounds: Normal heart sounds. No murmur.   Pulmonary:      Effort: No respiratory distress.      Breath sounds: Normal breath sounds. No wheezing or rales.   Abdominal:      General: Bowel sounds are normal. There is no distension.      Palpations: Abdomen is soft.      Tenderness: There is no abdominal tenderness.   Musculoskeletal:       Normal range of motion.   healed right hip incision   Skin:General:        Skin is warm.   Neurological:         Mental Status: Patient is alert and oriented to person, place, and time.    Psychiatric:         Behavior: Behavior normal.     Vitals:BP (!) 129/90   Pulse 83   Temp 97.8  F (36.6  C)   Resp 18   Ht 1.727 m (5' 8\")   Wt 59.2 kg (130 lb 9.6 oz)   SpO2 95%   BMI 19.86 kg/m   and Body mass index is 19.86 kg/m .    Lab/Diagnostic data:   Recent Results (from the past 240 hour(s))   Influenza A & B Antigen    Collection Time: 02/20/23 10:45 AM    Specimen: Nasopharyngeal; Swab   Result Value Ref Range    Influenza A antigen Negative Negative    Influenza B antigen Negative Negative   COVID-19 Virus (Coronavirus) by PCR Nasopharyngeal    Collection Time: 02/20/23 10:45 AM    Specimen: Nasopharyngeal; Swab   Result Value Ref Range    SARS CoV2 PCR Negative Negative     MEDICATIONS:     Review of your medicines          Accurate as of February 21, 2023 12:35 PM. If you have any questions, ask your nurse or doctor.            CONTINUE these medicines which have NOT CHANGED      Dose / Directions   acetaminophen 325 MG tablet  Commonly known as: TYLENOL      Dose: 650 mg  Take 650 mg by mouth every 4 hours as needed for mild pain  Refills: 0     aspirin 81 MG chewable tablet  Commonly known as: ASA      Dose: 81 mg  Take 81 mg by mouth daily  Refills: 0     bisacodyl 10 MG suppository  Commonly known as: DULCOLAX      Dose: 10 mg  Place 10 mg rectally daily as needed for " constipation  Refills: 0     Ferrous Gluconate 239 (27 Fe) MG Tabs      Dose: 1 tablet  Take 1 tablet by mouth daily  Refills: 0     gabapentin 100 MG capsule  Commonly known as: NEURONTIN      Dose: 300 mg  Take 300 mg by mouth At Bedtime  Refills: 0     Lidocaine 4 % Patch  Commonly known as: LIDOCARE      Dose: 1 patch  Place 1 patch onto the skin every 24 hours To prevent lidocaine toxicity, patient should be patch free for 12 hrs daily.  Refills: 0     melatonin 3 MG tablet      Dose: 3 mg  Take 3 mg by mouth nightly as needed for sleep  Refills: 0     metoprolol succinate ER 25 MG 24 hr tablet  Commonly known as: TOPROL XL      Dose: 25 mg  Take 25 mg by mouth daily  Refills: 0     polyethylene glycol 17 g packet  Commonly known as: MIRALAX      Dose: 1 packet  Take 1 packet by mouth daily  Refills: 0     senna-docusate 8.6-50 MG tablet  Commonly known as: SENOKOT-S/PERICOLACE      Dose: 2 tablet  Take 2 tablets by mouth 2 times daily  Refills: 0     vitamin D3 1000 units (25 mcg) tablet  Commonly known as: CHOLECALCIFEROL  Used for: Status post THR (total hip replacement)      Dose: 1,000 Units  Take 1 tablet by mouth daily.  Quantity: 100 tablet  Refills: 0            ASSESSMENT/PLAN  Encounter Diagnoses   Name Primary?     Physical deconditioning Yes     Pain management      Closed nondisplaced fracture of second cervical vertebra with routine healing, unspecified fracture morphology, subsequent encounter      Closed fracture of distal end of right radius with routine healing, unspecified fracture morphology, subsequent encounter      Closed fracture of second cervical vertebrae follow-up with orthopedics, pain control    Hyponatremia monitor labs last sodium 132    Physical deconditioning PT OT    Hypertension on Norvasc, metoprolol succinate    HDL Atorvastatin was dc'd in the hospital  due to elevated LFTS    Pain management Cymbalta dc'd in the hospital and gabapentin decreased, norco dc'd and lidocaine  patches added     Constipation given a suppository this morning increase senna S to 2 tablets twice daily hold for loose stools MiraLAX as needed      DISCHARGE PLAN/FACE TO FACE:  I certify that services are/were furnished while this patient was under the care of a physician and that a physician or an allowed non-physician practitioner (NPP), had a face-to-face encounter that meets the physician face-to-face encounter requirements. The encounter was in whole, or in part, related to the primary reason for home health. The patient is confined to his/her home and needs intermittent skilled nursing, physical therapy, speech-language pathology, or the continued need for occupational therapy. A plan of care has been established by a physician and is periodically reviewed by a physician.  Date of Face-to-Face Encounter: 2/21/23    I certify that, based on my findings, the following services are medically necessary home health services:PT/OT/HHA    My clinical findings support the need for the above skilled services because: PT OT for continued strength and endurance, home health aide to assist with activities of daily living    This patient is homebound because: He is deconditioned following recent cervical fracture, right wrist fracture and right hip fracture.  He continues with therapy    The patient is, or has been, under my care and I have initiated the establishment of the plan of care. This patient will be followed by a physician who will periodically review the plan of care.    Schedule follow up visit with primary care provider within 7 days to reestablish care.    Electronically signed by: Joy Soares CNP          Sincerely,        Joy Soares CNP

## 2023-02-27 NOTE — TELEPHONE ENCOUNTER
Pt's spouse declined an appt. They need help with canceling auto refills of pt's old medications and help with ordering new medications. Pt/spouse declined an appt. Call back number is . They would like a call back today or tomorrow.

## 2023-02-27 NOTE — TELEPHONE ENCOUNTER
I spoke with patient's wife. They need help with medication refills after hospitalization and TCU stay. I advised to call the [facility] clinic at [phone] to schedule a PCP visit for hospital follow-up. They have 7 pills left of metoprolol succinate 25mg once daily.    Preethi Lawson, PharmD, [facility]  Medication Therapy Management Pharmacist  Pager: [phone]

## 2023-03-28 NOTE — TELEPHONE ENCOUNTER
I called patient to offer a transitions of care medication review. Scheduled MTM phone call on Friday April 7th.    Alvino KimD, [facility]  Medication Therapy Management Pharmacist  Pager: [phone]

## 2023-04-07 NOTE — LETTER
_  Medication List        Prepared on: Apr 7, 2023     Bring your Medication List when you go to the doctor, hospital, or   emergency room. And, share it with your family or caregivers.     Note any changes to how you take your medications.  Cross out medications when you no longer use them.    Medication How I take it Why I use it Prescriber   acetaminophen (TYLENOL) 325 MG tablet Take 650 mg by mouth every 6 hours  Pain Elliott Corey MD     aspirin (ASA) 81 MG chewable tablet Take 81 mg by mouth daily Heart Elliott Corey MD   cholecalciferol (VITAMIN D) 1000 UNIT tablet Take 1 tablet by mouth daily. General Health Elliott Corey MD   ELIQUIS ANTICOAGULANT 2.5 MG tablet Take 1 tablet by mouth 2 times daily Afib (blood thinner) Elliott Corey MD   fluticasone (FLONASE) 50 MCG/ACT nasal spray Spray 1 spray into both nostrils daily Nasal drainage Elliott Corey MD   furosemide (LASIX) 20 MG tablet Take 1 tablet by mouth daily Fluid retention Elliott Corey MD     gabapentin (NEURONTIN) 300 MG capsule Take 600 mg by mouth At Bedtime Nerve pain Elliott Corey MD     Lidocaine (LIDOCARE) 4 % Patch Place 1 patch onto the skin every 24 hours To prevent lidocaine toxicity, patient should be patch free for 12 hrs daily. Pain Elliott Corey MD   melatonin 3 MG tablet Take 3 mg by mouth nightly as needed for sleep Sleep Elliott Corey MD   metoprolol tartrate (LOPRESSOR) 25 MG tablet Take 1 tablet by mouth 2 times daily Blood pressure Elliott Corey MD   polyethylene glycol (MIRALAX) 17 g packet Take 1 packet by mouth daily Constipation Elliott Corey MD   potassium chloride ER (K-TAB) 20 MEQ CR tablet Take 1 tablet by mouth daily Low potassium Elliott Corey MD   senna-docusate (SENOKOT-S/PERICOLACE) 8.6-50 MG tablet Take 2 tablets by mouth 2 times daily Constipation Elliott Corey MD   traZODone (DESYREL) 50 MG tablet Take 1 tablet by mouth daily Sleep Elliott Corey MD           Add new medications, over-the-counter  drugs, herbals, vitamins, or  minerals in the blank rows below.    Medication How I take it Why I use it Prescriber                                      Allergies:      amitriptyline hcl; bees; lamisil        Side effects I have had:               Other Information:              My notes and questions:

## 2023-04-07 NOTE — LETTER
"Recommended To-Do List      Prepared on: Apr 7, 2023       You can get the best results from your medications by completing the items on this \"To-Do List.\"      Bring your To-Do List when you go to your doctor. And, share it with your family or caregivers.    My To-Do List:  What we talked about: What I should do:   The importance of taking your medication as intended    Okay to cut the lidocaine patches if needed for easier application to specific areas.          What we talked about: What I should do:   Your medication dosage being too low    Change when you are taking acetaminophen (TYLENOL)- take every 6 hours scheduled.          What we talked about: What I should do:   Your medication dosage being too low    Increase your dosage of gabapentin (NEURONTIN) to 600mg at bedtime.          What we talked about: What I should do:                     "

## 2023-04-07 NOTE — LETTER
April 7, 2023  Matthew Branch  1957 [facility] [id number]    Dear Mr. Branch, [facility]     Thank you for talking with me on Apr 7, 2023 about your health and medications. As a follow-up to our conversation, I have included two documents:      Your Recommended To-Do List has steps you should take to get the best results from your medications.  Your Medication List will help you keep track of your medications and how to take them.    If you want to talk about these documents, please call Lisa Schafer RPH at phone: [phone], Monday-Friday 8-4:30pm.    I look forward to working with you and your doctors to make sure your medications work well for you.    Sincerely,  Lisa Schafer RPH  [facility] Pharmacist, [facility]

## 2023-04-07 NOTE — PATIENT INSTRUCTIONS
"Recommendations from today's MTM visit:                                                       1. Increase Gabapentin to 600mg at bedtime.     2. Set up acetaminophen 650mg every 6 hours (4 times daily) scheduled.     3. Okay to cut the Lidocaine patches, may need to consider increasing order to 2 per day if finding these helpful.    4. Future Discussion with Dr. Corey on bone density testing- pros/cons of treatment if indicated based on imaging.     Follow-up: Return in about 2 weeks (around 4/21/2023).    It was great speaking with you today.  I value your experience and would be very thankful for your time in providing feedback in our clinic survey. In the next few days, you may receive an email or text message from [employer] with a link to a survey related to your  clinical pharmacist.\"     My Clinical Pharmacist's contact information:                                                      Please feel free to contact me with any questions or concerns you have.      Lisa Schafer, Pharm.D, [facility]  Medication Therapy Management Pharmacist  [phone]      "

## 2023-04-07 NOTE — PROGRESS NOTES
Medication Therapy Management (MTM) Encounter    ASSESSMENT:                            Medication Adherence/Access: No issues identified    Hip Dislocation/Fractures/Insomnia: recommend switching acetaminophen to scheduled use to stay ahead of pain, okay to cut lidocaine patch to apply to both legs if needed while sticking to single patch per day and then recommend increase in gabapentin to 600mg at bedtime. Discussed multiple fractures and high fragility is an indication for him to have a bone density scan to evaluate pros/cons to treatment. If risk scores or bone density shows indication for treatment, would need to weight risks/benefits of therapy and given dysphagia concerns, would stick to injectable options. Reclast would allow for preventing further bone loss, while PTH analog could offer improvement in bone density, but would require daily home administration. Appropriate to have further discussion with Dr. Corey regarding next steps on bone health.     HFpEF: No changes at this time. Reviewed that current formulation of potassium is okay to cut, however If alternative formulation is given may not be able to do this so best to check formulation each refill. KlorCon M20 okay to split.     Afib: Stable.     Neuropathy: Recommend increase in gabapentin to 600mg at bedtime to help with pain and sleep.     Supplements: Stable.     Constipation: Stable    Drainage: stable.    PLAN:                            1. Increase Gabapentin to 600mg at bedtime.     2. Set up acetaminophen 650mg every 6 hours (4 times daily) scheduled.     3. Okay to cut the Lidocaine patches, may need to consider increasing order to 2 per day if finding these helpful.    4. Future Discussion with Dr. Corey on bone density testing- pros/cons of treatment if indicated based on imaging.     Follow-up: Return in about 2 weeks (around 4/21/2023).    SUBJECTIVE/OBJECTIVE:                          Matthew Branch is a 89 year old male called for a  transitions of care visit. He was discharged from [facility] on 3/6 for Heart Failure and then was seen in ED on 3/25 for hip dislocation.      Reason for visit: Medication review, wife present who is managing medication for Aron.     Allergies/ADRs: Reviewed in chart  Past Medical History: Reviewed in chart  Tobacco: He reports that he has never smoked. He has never used smokeless tobacco.  Alcohol: not currently using      Medication Adherence/Access: Patient is able to take his pill okay besides the potassium - is having to cut this in.  Home care, physical therapy/OT coming twice per week right now.   Bed bound currently     Stopped medication at discharge- Ferrous sulfate and Duloxetine- he was out of it when on this medication.    Hip Dislocation/Fractures/Insomnia:   Dislocation after being repositioned in bed- warranted ED visit on 3/25/23 and xray confirmed hip dislocation.   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   Most recent Ortho follow up on 3/30/23 with plan to return in 3 months for new xrays.   Right wrist, right hip and numerous cervical spine fractures noted in Jan 2023 post mechanical fall - went to TCU until Feb 26th. No documentation found for any dexa history.   Knee immobilizer wearing at night and a wedge to prevent his legs from rotating while sleeping making very hard to sleep.   Given trazodone 50mg to try at night- wasn't helpful at 25mg daily, so increased to 50mg but not making any difference.   Melatonin 3mg also used, but not given much relief.   Acetaminophen 325mg tabs- 2 tablets every 4 hours or more, doesn't give every 4 hours, just when he asks for the medication.   Has lidocaine patches at home that are being used again, but only using on one side since only allowed 1 patch per  Day.    Vitamin D 1000 units daily.     HFpEF:   Metoprolol tartrate 25mg twice daily  furosemide 20mg once daily.    Potassium  20mEq - has Klor con M20, cutting this in half taking with water  Patient reports no current medication side effects.     Hospitalized 2/28-3/6 for HF.   ALEXIS  Date 1/2023, EF 45-50%    Afib:   Eliquis 2.5mg twice daily for anticoagulation - does not have a history of GI bleed (dose reduced due to age and weight<60kg)  Aspirin 81mg daily  Patient reports  no current concerns of bruising or bleeding, no current medication side effects  Patient does not self-monitor blood pressure  [id number] Score       >/=4    Neuropathy:   Gabapentin 300mg at bedtime- had been on much higher doses in the past.   Trouble sleeping noted above due to hip issues and discomfort.     Supplements:  Vitamin D daily. Denies issues at this time.    Constipation:   Senna twice daily   Miralax daily  Longstanding issue, being immobile has made it worse. Medications are effective at this time.     Drainage:   Flonase 2 spray daily for congestion - has been helpful to decrease the drainage.    ----------------  Post Discharge Medication Reconciliation Status: discharge medications reconciled and changed, per note/orders.    I spent 31 minutes with this patient today. All changes were made via collaborative practice agreement with lEliott Corey MD. A copy of the visit note was provided to the patient's provider(s).    A summary of these recommendations was mailed to the patient.    Lisa Schafer, Pharm.D, [facility]  Medication Therapy Management Pharmacist  [phone]      Telemedicine Visit Details  Type of service:  Telephone visit  Start Time: 2:34 PM  End Time: 3:05 PM     Medication Therapy Recommendations  Multiple fractures    Current Medication: Lidocaine (LIDOCARE) 4 % Patch   Rationale: Cannot swallow/administer medication - Adherence - Adherence   Recommendation: Provide Education - okay to cut patches as needed   Status: Accepted per CPA          Current Medication: acetaminophen (TYLENOL) 325 MG tablet   Rationale: Incorrect  administration - Dosage too low - Effectiveness   Recommendation: Change Administration Time - schedule acetaminophen every 6 hours   Status: Accepted - no CPA Needed         Neuropathy    Current Medication: gabapentin (NEURONTIN) 300 MG capsule   Rationale: Dose too low - Dosage too low - Effectiveness   Recommendation: Increase Dose - 600mg daily   Status: Accepted per CPA

## 2023-04-21 NOTE — PATIENT INSTRUCTIONS
"Recommendations from today's MTM visit:                                                       1. No changes at this time, but staff continue to work on getting necessary paperwork for hospital bed     Follow-up: Return in 3 weeks (on 5/12/2023) for Phone call with MTM.    It was great speaking with you today.  I value your experience and would be very thankful for your time in providing feedback in our clinic survey. In the next few days, you may receive an email or text message from [place] [email or web address] with a link to a survey related to your  clinical pharmacist.\"     My Clinical Pharmacist's contact information:                                                      Please feel free to contact me with any questions or concerns you have.      Lisa Schafer, Pharm.D, [facility]  Medication Therapy Management Pharmacist  [phone]      "

## 2023-04-21 NOTE — PROGRESS NOTES
Medication Therapy Management (MTM) Encounter    ASSESSMENT:                            Medication Adherence/Access: No issues identified    Hip Dislocation/Fractures/Insomnia: continue same doses for now and keep planned follow up with surgeon on 5/1.     PLAN:                            1. No changes at this time, but staff will continue to work on getting necessary paperwork for hospital bed     Follow-up: Return in 3 weeks (on 5/12/2023) for Phone call with MTM.    SUBJECTIVE/OBJECTIVE:                          Matthew Branch is a 89 year old male called for a follow-up visit.  Today's visit is a follow-up MTM visit from 4/7/23     Reason for visit: Medication follow up, spoke with wife Damari for visit today.     Allergies/ADRs: Reviewed in chart  Past Medical History: Reviewed in chart  Tobacco: He reports that he has never smoked. He has never used smokeless tobacco.  Alcohol: not currently using    Medication Adherence/Access: Patient is able to take his pill okay besides the potassium - is having to cut this in.  Home care, physical therapy/OT coming twice per week right now.   Bed bound currently - ongoing issues with Medicare Paperwork for hospital bed with [facility].     Hip Dislocation/Fractures/Insomnia:   Gabapentin 300mg daily- 1 time did increase one dose of 600mg and patient was very restless and did not respond well so went back down to 1 - 300mg tab.   Acetaminophen 325mg tabs- 2 tablets every 4 hours or more, doesn't give every 4 hours, just when he asks for the medication.   Has lidocaine patches at home that are being used again, but only using on one side since only allowed 1 patch per  Day.    Vitamin D 1000 units daily.  trazodone 50mg to try at night- wasn't helpful at 25mg daily, so increased to 50mg but not making any difference.   Melatonin 3mg also used, but not given much relief.     Patient reports pain is typically around a 2 on a scale of 1 to 10.    Dislocation after being  repositioned in bed- warranted ED visit on 3/25/23 and xray confirmed hip dislocation.   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   Most recent Ortho follow up on 3/30/23 with plan to return in 3 months for new xrays.   Right wrist, right hip and numerous cervical spine fractures noted in Jan 2023 post mechanical fall - went to TCU until Feb 26th. No documentation found for any dexa history.   Knee immobilizer wearing at night and a wedge to prevent his legs from rotating while sleeping making very hard to sleep.     Neck issues requiring collar and was hoping to get out of the collar, however MRI on Monday- showing no additional healing, so next step is consulting spine specialist- scheduled 5/1/23.    ----------------    I spent 16 minutes with this patient today. All changes were made via collaborative practice agreement with Elliott Corey MD. A copy of the visit note was provided to the patient's provider(s).    A summary of these recommendations was given to the patient.    Lisa Schafer, Pharm.D, [facility]  Medication Therapy Management Pharmacist  [phone]      Telemedicine Visit Details  Type of service:  Telephone visit  Start Time: 3:03PM  End Time: 3:19 PM     Medication Therapy Recommendations  No medication therapy recommendations to display

## (undated) DEVICE — GLOVE PROTEXIS POWDER FREE 7.5 ORTHOPEDIC 2D73ET75

## (undated) DEVICE — SOL NACL 0.9% INJ 1000ML BAG 2B1324X

## (undated) DEVICE — KIT SURGICAL TURNOVER FVSD-01D

## (undated) DEVICE — SUCTION TIP YANKAUER STR K87

## (undated) DEVICE — DRAPE SHEET REV FOLD 3/4 9349

## (undated) DEVICE — DRILL BIT BIOM QUICK CONNECTING RING LOCK 3.2X30MM 31-323230

## (undated) DEVICE — SUCTION IRR SYSTEM W/O TIP INTERPULSE HANDPIECE 0210-100-000

## (undated) DEVICE — DRAPE CONVERTORS U-DRAPE 60X72" 8476

## (undated) DEVICE — GOWN IMPERVIOUS SPECIALTY XLG/XLONG 32474

## (undated) DEVICE — DRAPE C-ARM 60X42" 1013

## (undated) DEVICE — DRSG STERI STRIP 1/2X4" R1547

## (undated) DEVICE — DRAPE STERI TOWEL LG 1010

## (undated) DEVICE — SU VICRYL 2-0 CT-1 27" UND J259H

## (undated) DEVICE — SOLUTION WOUND CLEANSING 3/4OZ 10% PVP EA-L3011FB-50

## (undated) DEVICE — ESU GROUND PAD UNIVERSAL W/O CORD

## (undated) DEVICE — PACK TOTAL HIP W/U DRAPE SOP15HUFSC

## (undated) DEVICE — SU ETHIBOND 2 V-37 4X30" MX69G

## (undated) DEVICE — SOL BENZOIN 0.5OZ

## (undated) DEVICE — MANIFOLD NEPTUNE 4 PORT 700-20

## (undated) DEVICE — GLOVE PROTEXIS W/NEU-THERA 8.0  2D73TE80

## (undated) DEVICE — BONE CLEANING TIP INTERPULSE  0210-010-000

## (undated) DEVICE — SOL WATER IRRIG 1000ML BOTTLE 2F7114

## (undated) DEVICE — SU VICRYL 1 CTX 36" J371H

## (undated) DEVICE — LINEN TOWEL PACK X5 5464

## (undated) DEVICE — GLOVE PROTEXIS BLUE W/NEU-THERA 8.0  2D73EB80

## (undated) DEVICE — SU MONOCRYL 3-0 PS-2 27" Y427H

## (undated) DEVICE — GLOVE PROTEXIS W/NEU-THERA 7.5  2D73TE75

## (undated) DEVICE — DRAPE IOBAN INCISE 23X17" 6650EZ

## (undated) DEVICE — BLADE SAW SAGITTAL STRK 25X79.5X1.24MM 4/2000 2108-318-000

## (undated) DEVICE — SOL NACL 0.9% IRRIG 1000ML BOTTLE 2F7124

## (undated) DEVICE — PREP CHLORAPREP 26ML TINTED ORANGE  260815

## (undated) RX ORDER — DEXAMETHASONE SODIUM PHOSPHATE 4 MG/ML
INJECTION, SOLUTION INTRA-ARTICULAR; INTRALESIONAL; INTRAMUSCULAR; INTRAVENOUS; SOFT TISSUE
Status: DISPENSED
Start: 2019-08-20

## (undated) RX ORDER — HYDROMORPHONE HYDROCHLORIDE 1 MG/ML
INJECTION, SOLUTION INTRAMUSCULAR; INTRAVENOUS; SUBCUTANEOUS
Status: DISPENSED
Start: 2019-08-20

## (undated) RX ORDER — ONDANSETRON 2 MG/ML
INJECTION INTRAMUSCULAR; INTRAVENOUS
Status: DISPENSED
Start: 2019-08-20

## (undated) RX ORDER — NEOSTIGMINE METHYLSULFATE 1 MG/ML
VIAL (ML) INJECTION
Status: DISPENSED
Start: 2019-08-20

## (undated) RX ORDER — VANCOMYCIN HYDROCHLORIDE 1 G/20ML
INJECTION, POWDER, LYOPHILIZED, FOR SOLUTION INTRAVENOUS
Status: DISPENSED
Start: 2019-08-20

## (undated) RX ORDER — GLYCOPYRROLATE 0.2 MG/ML
INJECTION, SOLUTION INTRAMUSCULAR; INTRAVENOUS
Status: DISPENSED
Start: 2019-08-20

## (undated) RX ORDER — FENTANYL CITRATE 50 UG/ML
INJECTION, SOLUTION INTRAMUSCULAR; INTRAVENOUS
Status: DISPENSED
Start: 2019-08-20

## (undated) RX ORDER — KETOROLAC TROMETHAMINE 15 MG/ML
INJECTION, SOLUTION INTRAMUSCULAR; INTRAVENOUS
Status: DISPENSED
Start: 2019-08-20

## (undated) RX ORDER — OXYCODONE HCL 10 MG/1
TABLET, FILM COATED, EXTENDED RELEASE ORAL
Status: DISPENSED
Start: 2019-08-20

## (undated) RX ORDER — CEFAZOLIN SODIUM 2 G/100ML
INJECTION, SOLUTION INTRAVENOUS
Status: DISPENSED
Start: 2019-08-20